# Patient Record
Sex: MALE | Race: WHITE | NOT HISPANIC OR LATINO | Employment: OTHER | URBAN - NONMETROPOLITAN AREA
[De-identification: names, ages, dates, MRNs, and addresses within clinical notes are randomized per-mention and may not be internally consistent; named-entity substitution may affect disease eponyms.]

---

## 2017-01-03 ENCOUNTER — HOSPITAL ENCOUNTER (OUTPATIENT)
Dept: CARDIAC REHAB | Facility: HOSPITAL | Age: 77
Discharge: HOME | End: 2017-01-20
Attending: INTERNAL MEDICINE

## 2017-01-23 ENCOUNTER — OFFICE VISIT (OUTPATIENT)
Dept: CARDIAC REHAB | Facility: HOSPITAL | Age: 77
End: 2017-01-23

## 2017-01-23 ENCOUNTER — TRANSCRIBE ORDERS (OUTPATIENT)
Dept: CARDIAC REHAB | Facility: HOSPITAL | Age: 77
End: 2017-01-23

## 2017-01-23 DIAGNOSIS — Z95.1 S/P CABG (CORONARY ARTERY BYPASS GRAFT): Primary | ICD-10-CM

## 2017-01-23 DIAGNOSIS — I25.10 CAD IN NATIVE ARTERY: ICD-10-CM

## 2017-01-25 ENCOUNTER — OFFICE VISIT (OUTPATIENT)
Dept: CARDIAC REHAB | Facility: HOSPITAL | Age: 77
End: 2017-01-25

## 2017-01-25 DIAGNOSIS — I25.10 CORONARY ARTERY DISEASE INVOLVING NATIVE CORONARY ARTERY OF NATIVE HEART WITHOUT ANGINA PECTORIS: ICD-10-CM

## 2017-01-26 ENCOUNTER — OFFICE VISIT (OUTPATIENT)
Dept: CARDIAC REHAB | Facility: HOSPITAL | Age: 77
End: 2017-01-26

## 2017-01-26 DIAGNOSIS — I25.10 CORONARY ARTERY DISEASE INVOLVING NATIVE CORONARY ARTERY OF NATIVE HEART WITHOUT ANGINA PECTORIS: ICD-10-CM

## 2017-01-27 ENCOUNTER — OFFICE VISIT (OUTPATIENT)
Dept: CARDIAC REHAB | Facility: HOSPITAL | Age: 77
End: 2017-01-27

## 2017-01-27 DIAGNOSIS — I25.119 CORONARY ARTERY DISEASE WITH ANGINA PECTORIS, UNSPECIFIED VESSEL OR LESION TYPE, UNSPECIFIED WHETHER NATIVE OR TRANSPLANTED HEART (HCC): ICD-10-CM

## 2017-01-30 ENCOUNTER — OFFICE VISIT (OUTPATIENT)
Dept: CARDIAC REHAB | Facility: HOSPITAL | Age: 77
End: 2017-01-30

## 2017-01-30 DIAGNOSIS — I25.119 CORONARY ARTERY DISEASE WITH ANGINA PECTORIS, UNSPECIFIED VESSEL OR LESION TYPE, UNSPECIFIED WHETHER NATIVE OR TRANSPLANTED HEART (HCC): ICD-10-CM

## 2017-02-01 ENCOUNTER — OFFICE VISIT (OUTPATIENT)
Dept: CARDIAC REHAB | Facility: HOSPITAL | Age: 77
End: 2017-02-01

## 2017-02-01 DIAGNOSIS — I25.119 CORONARY ARTERY DISEASE WITH ANGINA PECTORIS, UNSPECIFIED VESSEL OR LESION TYPE, UNSPECIFIED WHETHER NATIVE OR TRANSPLANTED HEART (HCC): ICD-10-CM

## 2017-02-03 ENCOUNTER — OFFICE VISIT (OUTPATIENT)
Dept: CARDIAC REHAB | Facility: HOSPITAL | Age: 77
End: 2017-02-03

## 2017-02-03 DIAGNOSIS — I25.119 CORONARY ARTERY DISEASE WITH ANGINA PECTORIS, UNSPECIFIED VESSEL OR LESION TYPE, UNSPECIFIED WHETHER NATIVE OR TRANSPLANTED HEART (HCC): Primary | ICD-10-CM

## 2017-02-06 ENCOUNTER — OFFICE VISIT (OUTPATIENT)
Dept: CARDIAC REHAB | Facility: HOSPITAL | Age: 77
End: 2017-02-06

## 2017-02-06 DIAGNOSIS — I25.119 CORONARY ARTERY DISEASE WITH ANGINA PECTORIS, UNSPECIFIED VESSEL OR LESION TYPE, UNSPECIFIED WHETHER NATIVE OR TRANSPLANTED HEART (HCC): Primary | ICD-10-CM

## 2017-02-08 ENCOUNTER — OFFICE VISIT (OUTPATIENT)
Dept: CARDIAC REHAB | Facility: HOSPITAL | Age: 77
End: 2017-02-08

## 2017-02-08 DIAGNOSIS — I25.119 CORONARY ARTERY DISEASE WITH ANGINA PECTORIS, UNSPECIFIED VESSEL OR LESION TYPE, UNSPECIFIED WHETHER NATIVE OR TRANSPLANTED HEART (HCC): Primary | ICD-10-CM

## 2017-02-09 ENCOUNTER — OFFICE VISIT (OUTPATIENT)
Dept: CARDIAC REHAB | Facility: HOSPITAL | Age: 77
End: 2017-02-09

## 2017-02-09 ENCOUNTER — APPOINTMENT (OUTPATIENT)
Dept: GENERAL RADIOLOGY | Facility: HOSPITAL | Age: 77
End: 2017-02-09

## 2017-02-09 ENCOUNTER — HOSPITAL ENCOUNTER (EMERGENCY)
Facility: HOSPITAL | Age: 77
Discharge: HOME | End: 2017-02-09
Attending: EMERGENCY MEDICINE | Admitting: FAMILY MEDICINE

## 2017-02-09 ENCOUNTER — APPOINTMENT (OUTPATIENT)
Dept: CT IMAGING | Facility: HOSPITAL | Age: 77
End: 2017-02-09

## 2017-02-09 VITALS
TEMPERATURE: 99.4 F | OXYGEN SATURATION: 93 % | HEIGHT: 75 IN | RESPIRATION RATE: 18 BRPM | BODY MASS INDEX: 23.62 KG/M2 | DIASTOLIC BLOOD PRESSURE: 90 MMHG | WEIGHT: 190 LBS | SYSTOLIC BLOOD PRESSURE: 147 MMHG

## 2017-02-09 DIAGNOSIS — I25.119 CORONARY ARTERY DISEASE WITH ANGINA PECTORIS, UNSPECIFIED VESSEL OR LESION TYPE, UNSPECIFIED WHETHER NATIVE OR TRANSPLANTED HEART (HCC): Primary | ICD-10-CM

## 2017-02-09 DIAGNOSIS — J06.9 VIRAL UPPER RESPIRATORY TRACT INFECTION: Primary | ICD-10-CM

## 2017-02-09 DIAGNOSIS — I50.9 ACUTE ON CHRONIC CONGESTIVE HEART FAILURE, UNSPECIFIED CONGESTIVE HEART FAILURE TYPE: ICD-10-CM

## 2017-02-09 LAB
ALBUMIN SERPL-MCNC: 4.4 G/DL (ref 3.5–5)
ALBUMIN/GLOB SERPL: 1.4 G/DL (ref 1–2)
ALP SERPL-CCNC: 78 U/L (ref 38–126)
ALT SERPL W P-5'-P-CCNC: 30 U/L (ref 13–69)
ANION GAP SERPL CALCULATED.3IONS-SCNC: 12.1 MMOL/L
AST SERPL-CCNC: 24 U/L (ref 15–46)
BACTERIA UR QL AUTO: ABNORMAL /HPF
BASOPHILS # BLD AUTO: 0.02 10*3/MM3 (ref 0–0.2)
BASOPHILS NFR BLD AUTO: 0.3 % (ref 0–2.5)
BILIRUB SERPL-MCNC: 0.9 MG/DL (ref 0.2–1.3)
BILIRUB UR QL STRIP: NEGATIVE
BUN BLD-MCNC: 17 MG/DL (ref 7–20)
BUN/CREAT SERPL: 14.2 (ref 6.3–21.9)
CALCIUM SPEC-SCNC: 8.9 MG/DL (ref 8.4–10.2)
CHLORIDE SERPL-SCNC: 106 MMOL/L (ref 98–107)
CLARITY UR: CLEAR
CO2 SERPL-SCNC: 27 MMOL/L (ref 26–30)
COLOR UR: YELLOW
CREAT BLD-MCNC: 1.2 MG/DL (ref 0.6–1.3)
DEPRECATED RDW RBC AUTO: 42.5 FL (ref 37–54)
EOSINOPHIL # BLD AUTO: 0.05 10*3/MM3 (ref 0–0.7)
EOSINOPHIL NFR BLD AUTO: 0.7 % (ref 0–7)
ERYTHROCYTE [DISTWIDTH] IN BLOOD BY AUTOMATED COUNT: 14.7 % (ref 11.5–14.5)
FLUAV AG NPH QL: NEGATIVE
FLUBV AG NPH QL IA: NEGATIVE
GFR SERPL CREATININE-BSD FRML MDRD: 59 ML/MIN/1.73
GLOBULIN UR ELPH-MCNC: 3.1 GM/DL
GLUCOSE BLD-MCNC: 105 MG/DL (ref 74–98)
GLUCOSE UR STRIP-MCNC: NEGATIVE MG/DL
HCT VFR BLD AUTO: 39.2 % (ref 42–52)
HGB BLD-MCNC: 12.2 G/DL (ref 14–18)
HGB UR QL STRIP.AUTO: ABNORMAL
HOLD SPECIMEN: NORMAL
HOLD SPECIMEN: NORMAL
HYALINE CASTS UR QL AUTO: ABNORMAL /LPF
IMM GRANULOCYTES # BLD: 0.03 10*3/MM3 (ref 0–0.06)
IMM GRANULOCYTES NFR BLD: 0.4 % (ref 0–0.6)
INR PPP: 2.2 (ref 0.9–1.1)
KETONES UR QL STRIP: ABNORMAL
LEUKOCYTE ESTERASE UR QL STRIP.AUTO: NEGATIVE
LYMPHOCYTES # BLD AUTO: 0.62 10*3/MM3 (ref 0.6–3.4)
LYMPHOCYTES NFR BLD AUTO: 9 % (ref 10–50)
MCH RBC QN AUTO: 24.7 PG (ref 27–31)
MCHC RBC AUTO-ENTMCNC: 31.1 G/DL (ref 30–37)
MCV RBC AUTO: 79.5 FL (ref 80–94)
MONOCYTES # BLD AUTO: 0.95 10*3/MM3 (ref 0–0.9)
MONOCYTES NFR BLD AUTO: 13.8 % (ref 0–12)
NEUTROPHILS # BLD AUTO: 5.2 10*3/MM3 (ref 2–6.9)
NEUTROPHILS NFR BLD AUTO: 75.8 % (ref 37–80)
NITRITE UR QL STRIP: NEGATIVE
NRBC BLD MANUAL-RTO: 0 /100 WBC (ref 0–0)
NT-PROBNP SERPL-MCNC: 2230 PG/ML (ref 0–450)
PH UR STRIP.AUTO: 5.5 [PH] (ref 5–8)
PLATELET # BLD AUTO: 202 10*3/MM3 (ref 130–400)
PMV BLD AUTO: 11.6 FL (ref 6–12)
POTASSIUM BLD-SCNC: 4.1 MMOL/L (ref 3.5–5.1)
PROT SERPL-MCNC: 7.5 G/DL (ref 6.3–8.2)
PROT UR QL STRIP: ABNORMAL
PROTHROMBIN TIME: 24.1 SECONDS (ref 9.3–12.1)
RBC # BLD AUTO: 4.93 10*6/MM3 (ref 4.7–6.1)
RBC # UR: ABNORMAL /HPF
REF LAB TEST METHOD: ABNORMAL
SODIUM BLD-SCNC: 141 MMOL/L (ref 137–145)
SP GR UR STRIP: 1.02 (ref 1–1.03)
SQUAMOUS #/AREA URNS HPF: ABNORMAL /HPF
TROPONIN I SERPL-MCNC: 0.02 NG/ML (ref 0–0.05)
UROBILINOGEN UR QL STRIP: ABNORMAL
WBC NRBC COR # BLD: 6.87 10*3/MM3 (ref 4.8–10.8)
WBC UR QL AUTO: ABNORMAL /HPF
WHOLE BLOOD HOLD SPECIMEN: NORMAL
WHOLE BLOOD HOLD SPECIMEN: NORMAL

## 2017-02-09 PROCEDURE — 81001 URINALYSIS AUTO W/SCOPE: CPT | Performed by: EMERGENCY MEDICINE

## 2017-02-09 PROCEDURE — 84484 ASSAY OF TROPONIN QUANT: CPT | Performed by: EMERGENCY MEDICINE

## 2017-02-09 PROCEDURE — 87804 INFLUENZA ASSAY W/OPTIC: CPT | Performed by: EMERGENCY MEDICINE

## 2017-02-09 PROCEDURE — 83880 ASSAY OF NATRIURETIC PEPTIDE: CPT | Performed by: EMERGENCY MEDICINE

## 2017-02-09 PROCEDURE — 87040 BLOOD CULTURE FOR BACTERIA: CPT | Performed by: PHYSICIAN ASSISTANT

## 2017-02-09 PROCEDURE — 93005 ELECTROCARDIOGRAM TRACING: CPT | Performed by: EMERGENCY MEDICINE

## 2017-02-09 PROCEDURE — 96361 HYDRATE IV INFUSION ADD-ON: CPT

## 2017-02-09 PROCEDURE — 85610 PROTHROMBIN TIME: CPT | Performed by: EMERGENCY MEDICINE

## 2017-02-09 PROCEDURE — 71250 CT THORAX DX C-: CPT

## 2017-02-09 PROCEDURE — 85025 COMPLETE CBC W/AUTO DIFF WBC: CPT | Performed by: PHYSICIAN ASSISTANT

## 2017-02-09 PROCEDURE — 71020 HC CHEST PA AND LATERAL: CPT

## 2017-02-09 PROCEDURE — 96374 THER/PROPH/DIAG INJ IV PUSH: CPT

## 2017-02-09 PROCEDURE — 99284 EMERGENCY DEPT VISIT MOD MDM: CPT

## 2017-02-09 PROCEDURE — 25010000002 FUROSEMIDE PER 20 MG: Performed by: PHYSICIAN ASSISTANT

## 2017-02-09 PROCEDURE — 80053 COMPREHEN METABOLIC PANEL: CPT | Performed by: EMERGENCY MEDICINE

## 2017-02-09 RX ORDER — FUROSEMIDE 10 MG/ML
40 INJECTION INTRAMUSCULAR; INTRAVENOUS ONCE
Status: COMPLETED | OUTPATIENT
Start: 2017-02-09 | End: 2017-02-09

## 2017-02-09 RX ORDER — AMOXICILLIN AND CLAVULANATE POTASSIUM 875; 125 MG/1; MG/1
1 TABLET, FILM COATED ORAL 2 TIMES DAILY
Qty: 14 TABLET | Refills: 0 | Status: ON HOLD | OUTPATIENT
Start: 2017-02-09 | End: 2017-06-01

## 2017-02-09 RX ORDER — SODIUM CHLORIDE 0.9 % (FLUSH) 0.9 %
10 SYRINGE (ML) INJECTION AS NEEDED
Status: DISCONTINUED | OUTPATIENT
Start: 2017-02-09 | End: 2017-02-09 | Stop reason: HOSPADM

## 2017-02-09 RX ADMIN — FUROSEMIDE 40 MG: 10 INJECTION, SOLUTION INTRAMUSCULAR; INTRAVENOUS at 21:00

## 2017-02-09 RX ADMIN — SODIUM CHLORIDE 500 ML: 9 INJECTION, SOLUTION INTRAVENOUS at 18:39

## 2017-02-10 NOTE — ED PROVIDER NOTES
Subjective   HPI Comments: Patient is 76-year-old male who is here today complaining of smothering.  Patient states it is been happening for 3 days.  Patient also complains of some dizziness for the past 3-4 days.  Patient has had a bad head cold.  Per wife's report he has been coughing all night.  Patient denies any chest pain.  Patient states the smothering is worse when he lays down.  She also has complained of some intermittent vomiting, patient does have fever 99.5.  Patient does state that he feels bad.  She does have extensive cardiac history and history of CHF.      History provided by:  Patient and spouse      Review of Systems   Constitutional: Positive for fever.   HENT: Positive for congestion.    Respiratory: Positive for choking and shortness of breath.    Cardiovascular: Negative for chest pain.   All other systems reviewed and are negative.      Past Medical History   Diagnosis Date   • BPH (benign prostatic hyperplasia)    • Diabetes mellitus    • ED (erectile dysfunction)    • GERD (gastroesophageal reflux disease)    • Heart attack    • Hyperlipidemia    • Hypertension    • S/P CABG x 1      4 stents       Allergies   Allergen Reactions   • Latex Rash       Past Surgical History   Procedure Laterality Date   • Coronary artery bypass graft         History reviewed. No pertinent family history.    Social History     Social History   • Marital status:      Spouse name: N/A   • Number of children: N/A   • Years of education: N/A     Occupational History   •  Retired     Social History Main Topics   • Smoking status: Never Smoker   • Smokeless tobacco: Never Used   • Alcohol use No   • Drug use: No   • Sexual activity: Defer      Comment:      Other Topics Concern   • None     Social History Narrative   • None           Objective   Physical Exam   Constitutional: He is oriented to person, place, and time. He appears well-developed and well-nourished.   HENT:   Head: Normocephalic.   Eyes:  EOM are normal. Pupils are equal, round, and reactive to light.   Cardiovascular: Normal rate, regular rhythm and normal heart sounds.    Pulmonary/Chest:   Decreased breath sounds bilaterally   Abdominal: Soft.   Musculoskeletal: Normal range of motion.   Neurological: He is alert and oriented to person, place, and time.   Skin: Skin is warm and dry.   Psychiatric: He has a normal mood and affect. His behavior is normal. Judgment and thought content normal.   Nursing note and vitals reviewed.      Procedures         ED Course  ED Course      Patient was stable throughout the emergency room course.  Patient was found to have some evidence of congestive heart failure.  he describes symptoms of an upper respiratory infection and this is probably exacerbated his congestive heart failure.  X-ray did not show excessive fluid overload or consolidation.  Obtain CT chest which is not show fluid overload or large effusion.  he saturated 94-95% on room air without any respiratory distress during the ER visit.  We gave IV Lasix.  Patient will take his at home dose Lasix 40 mg twice a day over the next several days if he is symptomatic for shortness of breath.  Patient will follow with primary care closely I have instructed them to call him tomorrow.  Patient will return to the emergency room for any worsening symptoms.             MDM  Number of Diagnoses or Management Options      Final diagnoses:   Viral upper respiratory tract infection   Acute on chronic congestive heart failure, unspecified congestive heart failure type            Denise Steel PA-C  02/09/17 4821

## 2017-02-10 NOTE — DISCHARGE INSTRUCTIONS
Pt will take lasix BID as needed for the next few days for symptoms of SOB. Call primary care tomorrow.  Drink plenty of fluids.  Take augmentin.

## 2017-02-14 ENCOUNTER — OFFICE VISIT (OUTPATIENT)
Dept: CARDIAC REHAB | Facility: HOSPITAL | Age: 77
End: 2017-02-14

## 2017-02-14 DIAGNOSIS — I25.119 CORONARY ARTERY DISEASE WITH ANGINA PECTORIS, UNSPECIFIED VESSEL OR LESION TYPE, UNSPECIFIED WHETHER NATIVE OR TRANSPLANTED HEART (HCC): Primary | ICD-10-CM

## 2017-02-14 LAB
BACTERIA SPEC AEROBE CULT: NORMAL
BACTERIA SPEC AEROBE CULT: NORMAL

## 2017-02-27 ENCOUNTER — APPOINTMENT (OUTPATIENT)
Dept: CARDIAC REHAB | Facility: HOSPITAL | Age: 77
End: 2017-02-27

## 2017-02-28 ENCOUNTER — APPOINTMENT (OUTPATIENT)
Dept: CARDIAC REHAB | Facility: HOSPITAL | Age: 77
End: 2017-02-28

## 2017-02-28 ENCOUNTER — TRANSCRIBE ORDERS (OUTPATIENT)
Dept: CARDIAC REHAB | Facility: HOSPITAL | Age: 77
End: 2017-02-28

## 2017-02-28 DIAGNOSIS — I21.01: Primary | ICD-10-CM

## 2017-03-01 ENCOUNTER — APPOINTMENT (OUTPATIENT)
Dept: CARDIAC REHAB | Facility: HOSPITAL | Age: 77
End: 2017-03-01

## 2017-03-02 ENCOUNTER — APPOINTMENT (OUTPATIENT)
Dept: CARDIAC REHAB | Facility: HOSPITAL | Age: 77
End: 2017-03-02

## 2017-03-03 ENCOUNTER — APPOINTMENT (OUTPATIENT)
Dept: CARDIAC REHAB | Facility: HOSPITAL | Age: 77
End: 2017-03-03

## 2017-03-06 ENCOUNTER — APPOINTMENT (OUTPATIENT)
Dept: CARDIAC REHAB | Facility: HOSPITAL | Age: 77
End: 2017-03-06

## 2017-03-07 ENCOUNTER — APPOINTMENT (OUTPATIENT)
Dept: CARDIAC REHAB | Facility: HOSPITAL | Age: 77
End: 2017-03-07

## 2017-03-08 ENCOUNTER — APPOINTMENT (OUTPATIENT)
Dept: CARDIAC REHAB | Facility: HOSPITAL | Age: 77
End: 2017-03-08

## 2017-03-09 ENCOUNTER — APPOINTMENT (OUTPATIENT)
Dept: CARDIAC REHAB | Facility: HOSPITAL | Age: 77
End: 2017-03-09

## 2017-03-10 ENCOUNTER — APPOINTMENT (OUTPATIENT)
Dept: CARDIAC REHAB | Facility: HOSPITAL | Age: 77
End: 2017-03-10

## 2017-03-13 ENCOUNTER — APPOINTMENT (OUTPATIENT)
Dept: CARDIAC REHAB | Facility: HOSPITAL | Age: 77
End: 2017-03-13

## 2017-03-14 ENCOUNTER — APPOINTMENT (OUTPATIENT)
Dept: CARDIAC REHAB | Facility: HOSPITAL | Age: 77
End: 2017-03-14

## 2017-03-15 ENCOUNTER — APPOINTMENT (OUTPATIENT)
Dept: CARDIAC REHAB | Facility: HOSPITAL | Age: 77
End: 2017-03-15

## 2017-03-16 ENCOUNTER — APPOINTMENT (OUTPATIENT)
Dept: CARDIAC REHAB | Facility: HOSPITAL | Age: 77
End: 2017-03-16

## 2017-03-17 ENCOUNTER — APPOINTMENT (OUTPATIENT)
Dept: CARDIAC REHAB | Facility: HOSPITAL | Age: 77
End: 2017-03-17

## 2017-03-20 ENCOUNTER — APPOINTMENT (OUTPATIENT)
Dept: CARDIAC REHAB | Facility: HOSPITAL | Age: 77
End: 2017-03-20

## 2017-03-21 ENCOUNTER — APPOINTMENT (OUTPATIENT)
Dept: CARDIAC REHAB | Facility: HOSPITAL | Age: 77
End: 2017-03-21

## 2017-03-22 ENCOUNTER — APPOINTMENT (OUTPATIENT)
Dept: CARDIAC REHAB | Facility: HOSPITAL | Age: 77
End: 2017-03-22

## 2017-03-23 ENCOUNTER — APPOINTMENT (OUTPATIENT)
Dept: CARDIAC REHAB | Facility: HOSPITAL | Age: 77
End: 2017-03-23

## 2017-03-24 ENCOUNTER — APPOINTMENT (OUTPATIENT)
Dept: CARDIAC REHAB | Facility: HOSPITAL | Age: 77
End: 2017-03-24

## 2017-03-27 ENCOUNTER — APPOINTMENT (OUTPATIENT)
Dept: CARDIAC REHAB | Facility: HOSPITAL | Age: 77
End: 2017-03-27

## 2017-03-28 ENCOUNTER — APPOINTMENT (OUTPATIENT)
Dept: CARDIAC REHAB | Facility: HOSPITAL | Age: 77
End: 2017-03-28

## 2017-03-29 ENCOUNTER — APPOINTMENT (OUTPATIENT)
Dept: CARDIAC REHAB | Facility: HOSPITAL | Age: 77
End: 2017-03-29

## 2017-03-30 ENCOUNTER — APPOINTMENT (OUTPATIENT)
Dept: CARDIAC REHAB | Facility: HOSPITAL | Age: 77
End: 2017-03-30

## 2017-03-31 ENCOUNTER — APPOINTMENT (OUTPATIENT)
Dept: CARDIAC REHAB | Facility: HOSPITAL | Age: 77
End: 2017-03-31

## 2017-04-02 ENCOUNTER — HOSPITAL ENCOUNTER (EMERGENCY)
Facility: HOSPITAL | Age: 77
Discharge: HOME | End: 2017-04-02
Attending: EMERGENCY MEDICINE | Admitting: EMERGENCY MEDICINE

## 2017-04-02 VITALS
RESPIRATION RATE: 18 BRPM | SYSTOLIC BLOOD PRESSURE: 153 MMHG | OXYGEN SATURATION: 96 % | HEART RATE: 61 BPM | WEIGHT: 183 LBS | DIASTOLIC BLOOD PRESSURE: 73 MMHG | TEMPERATURE: 98.1 F | HEIGHT: 75 IN | BODY MASS INDEX: 22.75 KG/M2

## 2017-04-02 DIAGNOSIS — Z79.01 WARFARIN ANTICOAGULATION: Primary | ICD-10-CM

## 2017-04-02 DIAGNOSIS — S61.411A SKIN TEAR OF HAND WITHOUT COMPLICATION, RIGHT, INITIAL ENCOUNTER: ICD-10-CM

## 2017-04-02 LAB
INR PPP: 5.66 (ref 0.9–1.1)
PROTHROMBIN TIME: 62 SECONDS (ref 9.3–12.1)

## 2017-04-02 PROCEDURE — 99282 EMERGENCY DEPT VISIT SF MDM: CPT

## 2017-04-02 PROCEDURE — 85610 PROTHROMBIN TIME: CPT | Performed by: NURSE PRACTITIONER

## 2017-04-02 NOTE — DISCHARGE INSTRUCTIONS
Do not take tomorrow morning's Coumadin dose.  Do not take Tuesday mornings Coumadin dose.  See your primary care provider on Tuesday without exception.  Keep the pressure dressing in place for 24 hours and you may remove.  Thank you

## 2017-04-02 NOTE — ED NOTES
Pressure dressing applied over wound to left middle finger. No active bleeding noted.      Sandra Yoder RN  04/02/17 1955

## 2017-04-02 NOTE — ED PROVIDER NOTES
Subjective   HPI Comments: Patient presents to the emergency department with a small less than 1 cm abrasion over the right dorsal hand which is superficial however, the cut has continued to bleed and spite of conservative measures and skin care and first aid at home.  /INR was 3.2.  Patient has followed his primary care provider's instructions regarding reducing his warfarin temporarily.  A new INR has not yet been collected.  Patient denies any other additional injury.  This presenting injury was sustained on the sharp edge of her carotids were.      History provided by:  Patient and relative   used: No        Review of Systems   Skin:        Superficial skin tear see history of present illness   Hematological: Bruises/bleeds easily (on Coumadin).       Past Medical History:   Diagnosis Date   • BPH (benign prostatic hyperplasia)    • Diabetes mellitus    • ED (erectile dysfunction)    • GERD (gastroesophageal reflux disease)    • Heart attack    • Hyperlipidemia    • Hypertension    • S/P CABG x 1     4 stents       Allergies   Allergen Reactions   • Latex Rash       Past Surgical History:   Procedure Laterality Date   • CORONARY ARTERY BYPASS GRAFT         History reviewed. No pertinent family history.    Social History     Social History   • Marital status:      Spouse name: N/A   • Number of children: N/A   • Years of education: N/A     Occupational History   •  Retired     Social History Main Topics   • Smoking status: Never Smoker   • Smokeless tobacco: Never Used   • Alcohol use No   • Drug use: No   • Sexual activity: Defer      Comment:      Other Topics Concern   • None     Social History Narrative           Objective   Physical Exam   Constitutional: He is oriented to person, place, and time. He appears well-developed.   HENT:   Head: Normocephalic.   Eyes: EOM are normal. Pupils are equal, round, and reactive to light.   Neck: Normal range of motion. Neck supple.    Cardiovascular: Normal rate and regular rhythm.    Pulmonary/Chest: Effort normal. He has no wheezes. He has no rales.   Abdominal: Soft. Bowel sounds are normal. He exhibits no distension. There is no rebound and no guarding.   Musculoskeletal: Normal range of motion. He exhibits no edema.   Neurological: He is alert and oriented to person, place, and time.   Skin: Skin is warm and dry.   Less than 1 cm annular shaped superficial skin tear on the right dorsal hand.  Neurovascular motor exams are negative.  There is no active bleeding at time of my exam.   Psychiatric: He has a normal mood and affect.   Nursing note and vitals reviewed.      Laceration Repair  Date/Time: 4/2/2017 7:20 PM  Performed by: JUAN C RAMOS  Authorized by: JAMES PIERCE   Consent: Verbal consent obtained.  Consent given by: patient  Body area: upper extremity  Location details: right long finger  Laceration length: 1 cm  Foreign bodies: no foreign bodies  Tendon involvement: none  Nerve involvement: none  Vascular damage: no  Sedation:  Patient sedated: no    Irrigation solution: saline  Amount of cleaning: standard  Debridement: none  Degree of undermining: none  Skin closure: glue  Approximation: close  Approximation difficulty: simple               ED Course  ED Course   Value Comment By Time   INR: (!!) 5.66 (Reviewed) MADELEINE Carvajal 04/02 1927                  MDM  Number of Diagnoses or Management Options  Skin tear of hand without complication, right, initial encounter:   Warfarin anticoagulation:      Amount and/or Complexity of Data Reviewed  Clinical lab tests: reviewed        Final diagnoses:   Warfarin anticoagulation   Skin tear of hand without complication, right, initial encounter            MADELEINE Carvajal  04/02/17 1953

## 2017-04-03 ENCOUNTER — APPOINTMENT (OUTPATIENT)
Dept: CARDIAC REHAB | Facility: HOSPITAL | Age: 77
End: 2017-04-03

## 2017-04-04 ENCOUNTER — TREATMENT (OUTPATIENT)
Dept: CARDIAC REHAB | Facility: HOSPITAL | Age: 77
End: 2017-04-04

## 2017-04-04 ENCOUNTER — APPOINTMENT (OUTPATIENT)
Dept: CARDIAC REHAB | Facility: HOSPITAL | Age: 77
End: 2017-04-04

## 2017-04-04 DIAGNOSIS — I21.9 ACUTE MYOCARDIAL INFARCTION, UNSPECIFIED SITE, INITIAL EPISODE OF CARE: Primary | ICD-10-CM

## 2017-04-04 PROCEDURE — 93797 PHYS/QHP OP CAR RHAB WO ECG: CPT

## 2017-04-04 PROCEDURE — 93798 PHYS/QHP OP CAR RHAB W/ECG: CPT

## 2017-04-04 NOTE — PROGRESS NOTES
Pt was seen today in CR for a Phase 2 visit.  Vital signs and session notes recorded in [employer] and will be scanned into Epic by HIM.

## 2017-04-05 ENCOUNTER — APPOINTMENT (OUTPATIENT)
Dept: CARDIAC REHAB | Facility: HOSPITAL | Age: 77
End: 2017-04-05

## 2017-04-05 ENCOUNTER — TREATMENT (OUTPATIENT)
Dept: CARDIAC REHAB | Facility: HOSPITAL | Age: 77
End: 2017-04-05

## 2017-04-05 DIAGNOSIS — I21.9 ACUTE MYOCARDIAL INFARCTION, UNSPECIFIED SITE, INITIAL EPISODE OF CARE: Primary | ICD-10-CM

## 2017-04-05 PROCEDURE — 93798 PHYS/QHP OP CAR RHAB W/ECG: CPT

## 2017-04-05 NOTE — PROGRESS NOTES
Pt was seen today in CR for a Phase 2 visit.  Vital signs and session notes recorded in "[employer]" and will be scanned into Epic by HIM.

## 2017-04-06 ENCOUNTER — APPOINTMENT (OUTPATIENT)
Dept: CARDIAC REHAB | Facility: HOSPITAL | Age: 77
End: 2017-04-06

## 2017-04-07 ENCOUNTER — APPOINTMENT (OUTPATIENT)
Dept: CARDIAC REHAB | Facility: HOSPITAL | Age: 77
End: 2017-04-07

## 2017-04-07 ENCOUNTER — TREATMENT (OUTPATIENT)
Dept: CARDIAC REHAB | Facility: HOSPITAL | Age: 77
End: 2017-04-07

## 2017-04-07 DIAGNOSIS — I21.9 ACUTE MYOCARDIAL INFARCTION, UNSPECIFIED SITE, INITIAL EPISODE OF CARE: Primary | ICD-10-CM

## 2017-04-07 PROCEDURE — 93798 PHYS/QHP OP CAR RHAB W/ECG: CPT

## 2017-04-07 NOTE — PROGRESS NOTES
Pt was seen today in CR for a Phase 2 visit.  Vital signs and session notes recorded in [employer] and will be scanned into Epic by HIM.

## 2017-04-10 ENCOUNTER — TREATMENT (OUTPATIENT)
Dept: CARDIAC REHAB | Facility: HOSPITAL | Age: 77
End: 2017-04-10

## 2017-04-10 ENCOUNTER — APPOINTMENT (OUTPATIENT)
Dept: CARDIAC REHAB | Facility: HOSPITAL | Age: 77
End: 2017-04-10

## 2017-04-10 DIAGNOSIS — I21.9 ACUTE MYOCARDIAL INFARCTION, UNSPECIFIED SITE, INITIAL EPISODE OF CARE: Primary | ICD-10-CM

## 2017-04-10 PROCEDURE — 93798 PHYS/QHP OP CAR RHAB W/ECG: CPT

## 2017-04-10 RX ORDER — CLOPIDOGREL BISULFATE 75 MG/1
75 TABLET ORAL DAILY
COMMUNITY
End: 2017-06-03 | Stop reason: HOSPADM

## 2017-04-10 NOTE — PROGRESS NOTES
Pt was seen today in CR for a Phase 2 visit.  Vital signs and session notes recorded in [employer] and will be scanned into Epic by HIM.

## 2017-04-11 ENCOUNTER — APPOINTMENT (OUTPATIENT)
Dept: CARDIAC REHAB | Facility: HOSPITAL | Age: 77
End: 2017-04-11

## 2017-04-12 ENCOUNTER — APPOINTMENT (OUTPATIENT)
Dept: CARDIAC REHAB | Facility: HOSPITAL | Age: 77
End: 2017-04-12

## 2017-04-12 ENCOUNTER — TREATMENT (OUTPATIENT)
Dept: CARDIAC REHAB | Facility: HOSPITAL | Age: 77
End: 2017-04-12

## 2017-04-12 DIAGNOSIS — I21.9 ACUTE MYOCARDIAL INFARCTION, UNSPECIFIED SITE, INITIAL EPISODE OF CARE: Primary | ICD-10-CM

## 2017-04-12 PROCEDURE — 93798 PHYS/QHP OP CAR RHAB W/ECG: CPT

## 2017-04-13 ENCOUNTER — APPOINTMENT (OUTPATIENT)
Dept: CARDIAC REHAB | Facility: HOSPITAL | Age: 77
End: 2017-04-13

## 2017-04-14 ENCOUNTER — APPOINTMENT (OUTPATIENT)
Dept: CARDIAC REHAB | Facility: HOSPITAL | Age: 77
End: 2017-04-14

## 2017-04-14 ENCOUNTER — TREATMENT (OUTPATIENT)
Dept: CARDIAC REHAB | Facility: HOSPITAL | Age: 77
End: 2017-04-14

## 2017-04-14 DIAGNOSIS — I21.9 ACUTE MYOCARDIAL INFARCTION, UNSPECIFIED SITE, INITIAL EPISODE OF CARE: Primary | ICD-10-CM

## 2017-04-14 PROCEDURE — 93798 PHYS/QHP OP CAR RHAB W/ECG: CPT

## 2017-04-17 ENCOUNTER — TREATMENT (OUTPATIENT)
Dept: CARDIAC REHAB | Facility: HOSPITAL | Age: 77
End: 2017-04-17

## 2017-04-17 ENCOUNTER — APPOINTMENT (OUTPATIENT)
Dept: CARDIAC REHAB | Facility: HOSPITAL | Age: 77
End: 2017-04-17

## 2017-04-17 DIAGNOSIS — I21.9 ACUTE MYOCARDIAL INFARCTION, UNSPECIFIED SITE, INITIAL EPISODE OF CARE: Primary | ICD-10-CM

## 2017-04-17 PROCEDURE — 93798 PHYS/QHP OP CAR RHAB W/ECG: CPT

## 2017-04-18 ENCOUNTER — APPOINTMENT (OUTPATIENT)
Dept: CARDIAC REHAB | Facility: HOSPITAL | Age: 77
End: 2017-04-18

## 2017-04-19 ENCOUNTER — APPOINTMENT (OUTPATIENT)
Dept: CARDIAC REHAB | Facility: HOSPITAL | Age: 77
End: 2017-04-19

## 2017-04-19 ENCOUNTER — TREATMENT (OUTPATIENT)
Dept: CARDIAC REHAB | Facility: HOSPITAL | Age: 77
End: 2017-04-19

## 2017-04-19 DIAGNOSIS — I25.119 CORONARY ARTERY DISEASE WITH ANGINA PECTORIS, UNSPECIFIED VESSEL OR LESION TYPE, UNSPECIFIED WHETHER NATIVE OR TRANSPLANTED HEART (HCC): ICD-10-CM

## 2017-04-19 DIAGNOSIS — I21.9 ACUTE MYOCARDIAL INFARCTION, UNSPECIFIED SITE, INITIAL EPISODE OF CARE: Primary | ICD-10-CM

## 2017-04-19 PROCEDURE — 93798 PHYS/QHP OP CAR RHAB W/ECG: CPT

## 2017-04-19 NOTE — PROGRESS NOTES
Pt was seen today in CR for a Phase 2 visit.  Vital signs and session notes recorded in [employer] and will be scanned into Epic by HIM.

## 2017-04-20 ENCOUNTER — APPOINTMENT (OUTPATIENT)
Dept: CARDIAC REHAB | Facility: HOSPITAL | Age: 77
End: 2017-04-20

## 2017-04-21 ENCOUNTER — TREATMENT (OUTPATIENT)
Dept: CARDIAC REHAB | Facility: HOSPITAL | Age: 77
End: 2017-04-21

## 2017-04-21 ENCOUNTER — APPOINTMENT (OUTPATIENT)
Dept: CARDIAC REHAB | Facility: HOSPITAL | Age: 77
End: 2017-04-21

## 2017-04-21 DIAGNOSIS — I21.9 ACUTE MYOCARDIAL INFARCTION, UNSPECIFIED SITE, INITIAL EPISODE OF CARE: Primary | ICD-10-CM

## 2017-04-21 PROCEDURE — 93798 PHYS/QHP OP CAR RHAB W/ECG: CPT

## 2017-04-21 NOTE — PROGRESS NOTES
Pt was seen today in CR for a Phase 2 visit.  Vital signs and session notes recorded in [employer] and will be scanned into Epic by HIM.

## 2017-04-24 ENCOUNTER — APPOINTMENT (OUTPATIENT)
Dept: CARDIAC REHAB | Facility: HOSPITAL | Age: 77
End: 2017-04-24

## 2017-04-24 ENCOUNTER — TREATMENT (OUTPATIENT)
Dept: CARDIAC REHAB | Facility: HOSPITAL | Age: 77
End: 2017-04-24

## 2017-04-24 DIAGNOSIS — I21.9 ACUTE MYOCARDIAL INFARCTION, UNSPECIFIED SITE, INITIAL EPISODE OF CARE: Primary | ICD-10-CM

## 2017-04-24 PROCEDURE — 93797 PHYS/QHP OP CAR RHAB WO ECG: CPT

## 2017-04-24 PROCEDURE — 93798 PHYS/QHP OP CAR RHAB W/ECG: CPT

## 2017-04-24 NOTE — PROGRESS NOTES
Pt was seen today in CR for a Phase 2 visit.  Vital signs and session notes recorded in [employer] and will be scanned into Epic by HIM.

## 2017-04-25 ENCOUNTER — APPOINTMENT (OUTPATIENT)
Dept: CARDIAC REHAB | Facility: HOSPITAL | Age: 77
End: 2017-04-25

## 2017-04-26 ENCOUNTER — APPOINTMENT (OUTPATIENT)
Dept: CARDIAC REHAB | Facility: HOSPITAL | Age: 77
End: 2017-04-26

## 2017-04-26 ENCOUNTER — TREATMENT (OUTPATIENT)
Dept: CARDIAC REHAB | Facility: HOSPITAL | Age: 77
End: 2017-04-26

## 2017-04-26 DIAGNOSIS — I21.9 ACUTE MYOCARDIAL INFARCTION, UNSPECIFIED SITE, INITIAL EPISODE OF CARE: Primary | ICD-10-CM

## 2017-04-26 PROCEDURE — 93798 PHYS/QHP OP CAR RHAB W/ECG: CPT

## 2017-04-26 NOTE — PROGRESS NOTES
Pt was seen today in CR for a Phase 2 visit.  Vital signs and session notes recorded in [employer] and will be scanned into Epic by HIM.

## 2017-04-27 ENCOUNTER — APPOINTMENT (OUTPATIENT)
Dept: CARDIAC REHAB | Facility: HOSPITAL | Age: 77
End: 2017-04-27

## 2017-04-28 ENCOUNTER — TREATMENT (OUTPATIENT)
Dept: CARDIAC REHAB | Facility: HOSPITAL | Age: 77
End: 2017-04-28

## 2017-04-28 ENCOUNTER — APPOINTMENT (OUTPATIENT)
Dept: CARDIAC REHAB | Facility: HOSPITAL | Age: 77
End: 2017-04-28

## 2017-04-28 DIAGNOSIS — I21.9 ACUTE MYOCARDIAL INFARCTION, UNSPECIFIED SITE, INITIAL EPISODE OF CARE: Primary | ICD-10-CM

## 2017-04-28 PROCEDURE — 93798 PHYS/QHP OP CAR RHAB W/ECG: CPT

## 2017-05-01 ENCOUNTER — APPOINTMENT (OUTPATIENT)
Dept: CARDIAC REHAB | Facility: HOSPITAL | Age: 77
End: 2017-05-01

## 2017-05-01 ENCOUNTER — TREATMENT (OUTPATIENT)
Dept: CARDIAC REHAB | Facility: HOSPITAL | Age: 77
End: 2017-05-01

## 2017-05-01 DIAGNOSIS — I21.9 ACUTE MYOCARDIAL INFARCTION, UNSPECIFIED SITE, INITIAL EPISODE OF CARE: Primary | ICD-10-CM

## 2017-05-01 PROCEDURE — 93798 PHYS/QHP OP CAR RHAB W/ECG: CPT

## 2017-05-02 ENCOUNTER — APPOINTMENT (OUTPATIENT)
Dept: CARDIAC REHAB | Facility: HOSPITAL | Age: 77
End: 2017-05-02

## 2017-05-03 ENCOUNTER — APPOINTMENT (OUTPATIENT)
Dept: CARDIAC REHAB | Facility: HOSPITAL | Age: 77
End: 2017-05-03

## 2017-05-03 ENCOUNTER — TREATMENT (OUTPATIENT)
Dept: CARDIAC REHAB | Facility: HOSPITAL | Age: 77
End: 2017-05-03

## 2017-05-03 DIAGNOSIS — I21.9 ACUTE MYOCARDIAL INFARCTION, UNSPECIFIED SITE, INITIAL EPISODE OF CARE: Primary | ICD-10-CM

## 2017-05-03 DIAGNOSIS — I25.119 CORONARY ARTERY DISEASE WITH ANGINA PECTORIS, UNSPECIFIED VESSEL OR LESION TYPE, UNSPECIFIED WHETHER NATIVE OR TRANSPLANTED HEART (HCC): ICD-10-CM

## 2017-05-03 PROCEDURE — 93798 PHYS/QHP OP CAR RHAB W/ECG: CPT

## 2017-05-04 ENCOUNTER — APPOINTMENT (OUTPATIENT)
Dept: CARDIAC REHAB | Facility: HOSPITAL | Age: 77
End: 2017-05-04

## 2017-05-05 ENCOUNTER — TREATMENT (OUTPATIENT)
Dept: CARDIAC REHAB | Facility: HOSPITAL | Age: 77
End: 2017-05-05

## 2017-05-05 ENCOUNTER — APPOINTMENT (OUTPATIENT)
Dept: CARDIAC REHAB | Facility: HOSPITAL | Age: 77
End: 2017-05-05

## 2017-05-05 DIAGNOSIS — I21.9 ACUTE MYOCARDIAL INFARCTION, UNSPECIFIED SITE, INITIAL EPISODE OF CARE: Primary | ICD-10-CM

## 2017-05-05 PROCEDURE — 93798 PHYS/QHP OP CAR RHAB W/ECG: CPT

## 2017-05-08 ENCOUNTER — TREATMENT (OUTPATIENT)
Dept: CARDIAC REHAB | Facility: HOSPITAL | Age: 77
End: 2017-05-08

## 2017-05-08 ENCOUNTER — APPOINTMENT (OUTPATIENT)
Dept: CARDIAC REHAB | Facility: HOSPITAL | Age: 77
End: 2017-05-08

## 2017-05-08 DIAGNOSIS — I21.9 ACUTE MYOCARDIAL INFARCTION, UNSPECIFIED SITE, INITIAL EPISODE OF CARE: Primary | ICD-10-CM

## 2017-05-08 PROCEDURE — 93798 PHYS/QHP OP CAR RHAB W/ECG: CPT

## 2017-05-09 ENCOUNTER — APPOINTMENT (OUTPATIENT)
Dept: CARDIAC REHAB | Facility: HOSPITAL | Age: 77
End: 2017-05-09

## 2017-05-10 ENCOUNTER — APPOINTMENT (OUTPATIENT)
Dept: CARDIAC REHAB | Facility: HOSPITAL | Age: 77
End: 2017-05-10

## 2017-05-10 ENCOUNTER — TREATMENT (OUTPATIENT)
Dept: CARDIAC REHAB | Facility: HOSPITAL | Age: 77
End: 2017-05-10

## 2017-05-10 DIAGNOSIS — I21.9 ACUTE MYOCARDIAL INFARCTION, UNSPECIFIED SITE, INITIAL EPISODE OF CARE: Primary | ICD-10-CM

## 2017-05-10 PROCEDURE — 93798 PHYS/QHP OP CAR RHAB W/ECG: CPT

## 2017-05-11 ENCOUNTER — APPOINTMENT (OUTPATIENT)
Dept: CARDIAC REHAB | Facility: HOSPITAL | Age: 77
End: 2017-05-11

## 2017-05-12 ENCOUNTER — TREATMENT (OUTPATIENT)
Dept: CARDIAC REHAB | Facility: HOSPITAL | Age: 77
End: 2017-05-12

## 2017-05-12 ENCOUNTER — APPOINTMENT (OUTPATIENT)
Dept: CARDIAC REHAB | Facility: HOSPITAL | Age: 77
End: 2017-05-12

## 2017-05-12 DIAGNOSIS — I21.9 ACUTE MYOCARDIAL INFARCTION, UNSPECIFIED SITE, INITIAL EPISODE OF CARE: Primary | ICD-10-CM

## 2017-05-12 PROCEDURE — 93798 PHYS/QHP OP CAR RHAB W/ECG: CPT

## 2017-05-15 ENCOUNTER — TREATMENT (OUTPATIENT)
Dept: CARDIAC REHAB | Facility: HOSPITAL | Age: 77
End: 2017-05-15

## 2017-05-15 ENCOUNTER — APPOINTMENT (OUTPATIENT)
Dept: CARDIAC REHAB | Facility: HOSPITAL | Age: 77
End: 2017-05-15

## 2017-05-15 DIAGNOSIS — I25.119 CORONARY ARTERY DISEASE WITH ANGINA PECTORIS, UNSPECIFIED VESSEL OR LESION TYPE, UNSPECIFIED WHETHER NATIVE OR TRANSPLANTED HEART (HCC): ICD-10-CM

## 2017-05-15 DIAGNOSIS — I21.9 ACUTE MYOCARDIAL INFARCTION, UNSPECIFIED SITE, INITIAL EPISODE OF CARE: Primary | ICD-10-CM

## 2017-05-15 PROCEDURE — 93798 PHYS/QHP OP CAR RHAB W/ECG: CPT

## 2017-05-16 ENCOUNTER — APPOINTMENT (OUTPATIENT)
Dept: CARDIAC REHAB | Facility: HOSPITAL | Age: 77
End: 2017-05-16

## 2017-05-17 ENCOUNTER — APPOINTMENT (OUTPATIENT)
Dept: CARDIAC REHAB | Facility: HOSPITAL | Age: 77
End: 2017-05-17

## 2017-05-17 ENCOUNTER — TREATMENT (OUTPATIENT)
Dept: CARDIAC REHAB | Facility: HOSPITAL | Age: 77
End: 2017-05-17

## 2017-05-17 DIAGNOSIS — I21.9 ACUTE MYOCARDIAL INFARCTION, UNSPECIFIED SITE, INITIAL EPISODE OF CARE: Primary | ICD-10-CM

## 2017-05-17 PROCEDURE — 93797 PHYS/QHP OP CAR RHAB WO ECG: CPT

## 2017-05-17 PROCEDURE — 93798 PHYS/QHP OP CAR RHAB W/ECG: CPT

## 2017-05-18 ENCOUNTER — APPOINTMENT (OUTPATIENT)
Dept: CARDIAC REHAB | Facility: HOSPITAL | Age: 77
End: 2017-05-18

## 2017-05-19 ENCOUNTER — APPOINTMENT (OUTPATIENT)
Dept: CARDIAC REHAB | Facility: HOSPITAL | Age: 77
End: 2017-05-19

## 2017-05-19 ENCOUNTER — TREATMENT (OUTPATIENT)
Dept: CARDIAC REHAB | Facility: HOSPITAL | Age: 77
End: 2017-05-19

## 2017-05-19 DIAGNOSIS — I21.9 ACUTE MYOCARDIAL INFARCTION, UNSPECIFIED SITE, INITIAL EPISODE OF CARE: Primary | ICD-10-CM

## 2017-05-19 PROCEDURE — 93798 PHYS/QHP OP CAR RHAB W/ECG: CPT

## 2017-05-22 ENCOUNTER — TREATMENT (OUTPATIENT)
Dept: CARDIAC REHAB | Facility: HOSPITAL | Age: 77
End: 2017-05-22

## 2017-05-22 ENCOUNTER — APPOINTMENT (OUTPATIENT)
Dept: CARDIAC REHAB | Facility: HOSPITAL | Age: 77
End: 2017-05-22

## 2017-05-22 DIAGNOSIS — I21.9 ACUTE MYOCARDIAL INFARCTION, UNSPECIFIED SITE, INITIAL EPISODE OF CARE: Primary | ICD-10-CM

## 2017-05-22 PROCEDURE — 93798 PHYS/QHP OP CAR RHAB W/ECG: CPT

## 2017-05-23 ENCOUNTER — APPOINTMENT (OUTPATIENT)
Dept: CARDIAC REHAB | Facility: HOSPITAL | Age: 77
End: 2017-05-23

## 2017-05-24 ENCOUNTER — APPOINTMENT (OUTPATIENT)
Dept: CARDIAC REHAB | Facility: HOSPITAL | Age: 77
End: 2017-05-24

## 2017-05-25 ENCOUNTER — APPOINTMENT (OUTPATIENT)
Dept: CARDIAC REHAB | Facility: HOSPITAL | Age: 77
End: 2017-05-25

## 2017-05-26 ENCOUNTER — TRANSCRIBE ORDERS (OUTPATIENT)
Dept: CARDIAC REHAB | Facility: HOSPITAL | Age: 77
End: 2017-05-26

## 2017-05-26 ENCOUNTER — APPOINTMENT (OUTPATIENT)
Dept: CARDIAC REHAB | Facility: HOSPITAL | Age: 77
End: 2017-05-26

## 2017-05-26 DIAGNOSIS — I21.4 NSTEMI (NON-ST ELEVATED MYOCARDIAL INFARCTION) (HCC): Primary | ICD-10-CM

## 2017-05-30 ENCOUNTER — APPOINTMENT (OUTPATIENT)
Dept: CARDIAC REHAB | Facility: HOSPITAL | Age: 77
End: 2017-05-30

## 2017-05-31 ENCOUNTER — APPOINTMENT (OUTPATIENT)
Dept: CARDIAC REHAB | Facility: HOSPITAL | Age: 77
End: 2017-05-31

## 2017-06-01 ENCOUNTER — APPOINTMENT (OUTPATIENT)
Dept: CT IMAGING | Facility: HOSPITAL | Age: 77
End: 2017-06-01

## 2017-06-01 ENCOUNTER — APPOINTMENT (OUTPATIENT)
Dept: CARDIOLOGY | Facility: HOSPITAL | Age: 77
End: 2017-06-01
Attending: INTERNAL MEDICINE

## 2017-06-01 ENCOUNTER — APPOINTMENT (OUTPATIENT)
Dept: GENERAL RADIOLOGY | Facility: HOSPITAL | Age: 77
End: 2017-06-01

## 2017-06-01 ENCOUNTER — APPOINTMENT (OUTPATIENT)
Dept: CARDIAC REHAB | Facility: HOSPITAL | Age: 77
End: 2017-06-01

## 2017-06-01 ENCOUNTER — HOSPITAL ENCOUNTER (INPATIENT)
Facility: HOSPITAL | Age: 77
LOS: 2 days | Discharge: HOME | End: 2017-06-03
Attending: INTERNAL MEDICINE | Admitting: NEUROLOGICAL SURGERY

## 2017-06-01 DIAGNOSIS — Z78.9 IMPAIRED MOBILITY AND ADLS: ICD-10-CM

## 2017-06-01 DIAGNOSIS — Z74.09 IMPAIRED FUNCTIONAL MOBILITY, BALANCE, GAIT, AND ENDURANCE: ICD-10-CM

## 2017-06-01 DIAGNOSIS — I63.9 CEREBROVASCULAR ACCIDENT (CVA), UNSPECIFIED MECHANISM (HCC): Primary | ICD-10-CM

## 2017-06-01 DIAGNOSIS — R79.89 ELEVATED SERUM CREATININE: ICD-10-CM

## 2017-06-01 DIAGNOSIS — I21.3 ST ELEVATION MYOCARDIAL INFARCTION (STEMI), UNSPECIFIED ARTERY (HCC): ICD-10-CM

## 2017-06-01 DIAGNOSIS — R77.8 ELEVATED TROPONIN: ICD-10-CM

## 2017-06-01 DIAGNOSIS — Z74.09 IMPAIRED MOBILITY AND ADLS: ICD-10-CM

## 2017-06-01 DIAGNOSIS — R47.1 DYSARTHRIA: ICD-10-CM

## 2017-06-01 PROBLEM — Z86.73 H/O: CVA (CEREBROVASCULAR ACCIDENT): Status: ACTIVE | Noted: 2017-06-01

## 2017-06-01 PROBLEM — I48.0 PAF (PAROXYSMAL ATRIAL FIBRILLATION) (HCC): Status: ACTIVE | Noted: 2017-06-01

## 2017-06-01 PROBLEM — E78.5 HYPERLIPIDEMIA: Status: ACTIVE | Noted: 2017-06-01

## 2017-06-01 PROBLEM — I10 HYPERTENSION: Status: ACTIVE | Noted: 2017-06-01

## 2017-06-01 PROBLEM — I47.29 NON-SUSTAINED VENTRICULAR TACHYCARDIA (HCC): Status: ACTIVE | Noted: 2017-06-01

## 2017-06-01 PROBLEM — I25.10 CORONARY ARTERY DISEASE: Status: ACTIVE | Noted: 2017-06-01

## 2017-06-01 PROBLEM — I25.5 ISCHEMIC CARDIOMYOPATHY: Status: ACTIVE | Noted: 2017-06-01

## 2017-06-01 PROBLEM — E11.9 DIABETES MELLITUS (HCC): Status: ACTIVE | Noted: 2017-06-01

## 2017-06-01 LAB
ALT SERPL W P-5'-P-CCNC: 13 U/L (ref 7–40)
AST SERPL-CCNC: 16 U/L (ref 0–33)
BASOPHILS # BLD AUTO: 0.02 10*3/MM3 (ref 0–0.2)
BASOPHILS NFR BLD AUTO: 0.3 % (ref 0–1)
BH CV ECHO MEAS - AO ROOT AREA (BSA CORRECTED): 2
BH CV ECHO MEAS - AO ROOT AREA: 11.9 CM^2
BH CV ECHO MEAS - AO ROOT DIAM: 3.9 CM
BH CV ECHO MEAS - BSA(HAYCOCK): 2 M^2
BH CV ECHO MEAS - BSA: 2 M^2
BH CV ECHO MEAS - BZI_BMI: 20.9 KILOGRAMS/M^2
BH CV ECHO MEAS - BZI_METRIC_HEIGHT: 188 CM
BH CV ECHO MEAS - BZI_METRIC_WEIGHT: 73.9 KG
BH CV ECHO MEAS - EDV(CUBED): 164.6 ML
BH CV ECHO MEAS - EDV(TEICH): 146.2 ML
BH CV ECHO MEAS - EF(CUBED): 64 %
BH CV ECHO MEAS - EF(TEICH): 54.9 %
BH CV ECHO MEAS - ESV(CUBED): 59.3 ML
BH CV ECHO MEAS - ESV(TEICH): 65.9 ML
BH CV ECHO MEAS - FS: 28.8 %
BH CV ECHO MEAS - IVS/LVPW: 0.95
BH CV ECHO MEAS - IVSD: 1.2 CM
BH CV ECHO MEAS - LA DIMENSION: 4.6 CM
BH CV ECHO MEAS - LA/AO: 1.2
BH CV ECHO MEAS - LV MASS(C)D: 284.9 GRAMS
BH CV ECHO MEAS - LV MASS(C)DI: 143 GRAMS/M^2
BH CV ECHO MEAS - LVIDD: 5.5 CM
BH CV ECHO MEAS - LVIDS: 3.9 CM
BH CV ECHO MEAS - LVPWD: 1.3 CM
BH CV ECHO MEAS - MV A MAX VEL: 104 CM/SEC
BH CV ECHO MEAS - MV DEC SLOPE: 274 CM/SEC^2
BH CV ECHO MEAS - MV DEC TIME: 0.3 SEC
BH CV ECHO MEAS - MV E MAX VEL: 82.9 CM/SEC
BH CV ECHO MEAS - MV E/A: 0.8
BH CV ECHO MEAS - MV P1/2T MAX VEL: 93.4 CM/SEC
BH CV ECHO MEAS - MV P1/2T: 99.8 MSEC
BH CV ECHO MEAS - MVA P1/2T LCG: 2.4 CM^2
BH CV ECHO MEAS - MVA(P1/2T): 2.2 CM^2
BH CV ECHO MEAS - PA ACC SLOPE: 958 CM/SEC^2
BH CV ECHO MEAS - PA ACC TIME: 0.13 SEC
BH CV ECHO MEAS - PA PR(ACCEL): 18.7 MMHG
BH CV ECHO MEAS - PI END-D VEL: 134 CM/SEC
BH CV ECHO MEAS - RAP SYSTOLE: 3 MMHG
BH CV ECHO MEAS - RV MAX PG: 2.8 MMHG
BH CV ECHO MEAS - RV V1 MAX: 83.6 CM/SEC
BH CV ECHO MEAS - RVSP: 17.1 MMHG
BH CV ECHO MEAS - SI(CUBED): 52.8 ML/M^2
BH CV ECHO MEAS - SI(TEICH): 40.3 ML/M^2
BH CV ECHO MEAS - SV(CUBED): 105.2 ML
BH CV ECHO MEAS - SV(TEICH): 80.3 ML
BH CV ECHO MEAS - TAPSE (>1.6): 1.6 CM2
BH CV ECHO MEAS - TR MAX VEL: 188 CM/SEC
BH CV VAS BP RIGHT ARM: NORMAL MMHG
BH CV XLRA - RV BASE: 4 CM
BH CV XLRA - RV LENGTH: 7.5 CM
BH CV XLRA - RV MID: 4 CM
BH CV XLRA - TDI S': 10.2 CM/SEC
BUN BLDA-MCNC: 16 MG/DL (ref 8–26)
CA-I BLDA-SCNC: 1.19 MMOL/L (ref 1.2–1.32)
CHLORIDE BLDA-SCNC: 98 MMOL/L (ref 98–109)
CO2 BLDA-SCNC: 27 MMOL/L (ref 24–29)
CREAT BLDA-MCNC: 1.4 MG/DL (ref 0.6–1.3)
DEPRECATED RDW RBC AUTO: 45.5 FL (ref 37–54)
EOSINOPHIL # BLD AUTO: 0.25 10*3/MM3 (ref 0.1–0.3)
EOSINOPHIL NFR BLD AUTO: 3.8 % (ref 0–3)
ERYTHROCYTE [DISTWIDTH] IN BLOOD BY AUTOMATED COUNT: 15.6 % (ref 11.3–14.5)
GLUCOSE BLDC GLUCOMTR-MCNC: 115 MG/DL (ref 70–130)
GLUCOSE BLDC GLUCOMTR-MCNC: 115 MG/DL (ref 70–130)
GLUCOSE BLDC GLUCOMTR-MCNC: 182 MG/DL (ref 70–130)
HCT VFR BLD AUTO: 37.9 % (ref 38.9–50.9)
HCT VFR BLDA CALC: 38 % (ref 38–51)
HGB BLD-MCNC: 12 G/DL (ref 13.1–17.5)
HGB BLDA-MCNC: 12.9 G/DL (ref 12–17)
HOLD SPECIMEN: NORMAL
HOLD SPECIMEN: NORMAL
IMM GRANULOCYTES # BLD: 0.02 10*3/MM3 (ref 0–0.03)
IMM GRANULOCYTES NFR BLD: 0.3 % (ref 0–0.6)
INR PPP: 1.1 (ref 0.8–1.2)
LEFT ATRIUM VOLUME INDEX: 47.7 ML/M2
LEFT ATRIUM VOLUME: 95 CM3
LV EF 2D ECHO EST: 50 %
LYMPHOCYTES # BLD AUTO: 1.37 10*3/MM3 (ref 0.6–4.8)
LYMPHOCYTES NFR BLD AUTO: 20.9 % (ref 24–44)
MCH RBC QN AUTO: 25.6 PG (ref 27–31)
MCHC RBC AUTO-ENTMCNC: 31.7 G/DL (ref 32–36)
MCV RBC AUTO: 81 FL (ref 80–99)
MONOCYTES # BLD AUTO: 0.62 10*3/MM3 (ref 0–1)
MONOCYTES NFR BLD AUTO: 9.5 % (ref 0–12)
NEUTROPHILS # BLD AUTO: 4.26 10*3/MM3 (ref 1.5–8.3)
NEUTROPHILS NFR BLD AUTO: 65.2 % (ref 41–71)
PLATELET # BLD AUTO: 235 10*3/MM3 (ref 150–450)
PMV BLD AUTO: 10.6 FL (ref 6–12)
POTASSIUM BLDA-SCNC: 3.3 MMOL/L (ref 3.5–4.9)
PROTHROMBIN TIME: 13.5 SECONDS (ref 12.8–15.2)
RBC # BLD AUTO: 4.68 10*6/MM3 (ref 4.2–5.76)
SODIUM BLDA-SCNC: 142 MMOL/L (ref 138–146)
TROPONIN I SERPL-MCNC: 1.04 NG/ML (ref 0–0.07)
WBC NRBC COR # BLD: 6.54 10*3/MM3 (ref 3.5–10.8)
WHOLE BLOOD HOLD SPECIMEN: NORMAL
WHOLE BLOOD HOLD SPECIMEN: NORMAL

## 2017-06-01 PROCEDURE — C1894 INTRO/SHEATH, NON-LASER: HCPCS | Performed by: NEUROLOGICAL SURGERY

## 2017-06-01 PROCEDURE — B3141ZZ FLUOROSCOPY OF LEFT COMMON CAROTID ARTERY USING LOW OSMOLAR CONTRAST: ICD-10-PCS | Performed by: RADIOLOGY

## 2017-06-01 PROCEDURE — C2628 CATHETER, OCCLUSION: HCPCS | Performed by: NEUROLOGICAL SURGERY

## 2017-06-01 PROCEDURE — 80047 BASIC METABLC PNL IONIZED CA: CPT

## 2017-06-01 PROCEDURE — 84450 TRANSFERASE (AST) (SGOT): CPT | Performed by: EMERGENCY MEDICINE

## 2017-06-01 PROCEDURE — 84484 ASSAY OF TROPONIN QUANT: CPT

## 2017-06-01 PROCEDURE — C1887 CATHETER, GUIDING: HCPCS | Performed by: NEUROLOGICAL SURGERY

## 2017-06-01 PROCEDURE — 70450 CT HEAD/BRAIN W/O DYE: CPT

## 2017-06-01 PROCEDURE — C1760 CLOSURE DEV, VASC: HCPCS | Performed by: NEUROLOGICAL SURGERY

## 2017-06-01 PROCEDURE — 99291 CRITICAL CARE FIRST HOUR: CPT | Performed by: INTERNAL MEDICINE

## 2017-06-01 PROCEDURE — 36224 PLACE CATH CAROTD ART: CPT | Performed by: NEUROLOGICAL SURGERY

## 2017-06-01 PROCEDURE — 25010000002 HEPARIN (PORCINE) PER 1000 UNITS: Performed by: RADIOLOGY

## 2017-06-01 PROCEDURE — 61645 PERQ ART M-THROMBECT &/NFS: CPT | Performed by: NEUROLOGICAL SURGERY

## 2017-06-01 PROCEDURE — 82962 GLUCOSE BLOOD TEST: CPT

## 2017-06-01 PROCEDURE — 0042T HC CT CEREBRAL PERFUSION W/WO CONTRAST: CPT

## 2017-06-01 PROCEDURE — 71010 HC CHEST PA OR AP: CPT

## 2017-06-01 PROCEDURE — 85025 COMPLETE CBC W/AUTO DIFF WBC: CPT | Performed by: EMERGENCY MEDICINE

## 2017-06-01 PROCEDURE — C1769 GUIDE WIRE: HCPCS | Performed by: NEUROLOGICAL SURGERY

## 2017-06-01 PROCEDURE — 0 IOPAMIDOL PER 1 ML: Performed by: EMERGENCY MEDICINE

## 2017-06-01 PROCEDURE — 93005 ELECTROCARDIOGRAM TRACING: CPT | Performed by: EMERGENCY MEDICINE

## 2017-06-01 PROCEDURE — 92610 EVALUATE SWALLOWING FUNCTION: CPT

## 2017-06-01 PROCEDURE — 99221 1ST HOSP IP/OBS SF/LOW 40: CPT | Performed by: NEUROLOGICAL SURGERY

## 2017-06-01 PROCEDURE — 85014 HEMATOCRIT: CPT

## 2017-06-01 PROCEDURE — 0 IODIXANOL PER 1 ML: Performed by: NEUROLOGICAL SURGERY

## 2017-06-01 PROCEDURE — 3E05317 INTRODUCTION OF OTHER THROMBOLYTIC INTO PERIPHERAL ARTERY, PERCUTANEOUS APPROACH: ICD-10-PCS | Performed by: RADIOLOGY

## 2017-06-01 PROCEDURE — 03CL3ZZ EXTIRPATION OF MATTER FROM LEFT INTERNAL CAROTID ARTERY, PERCUTANEOUS APPROACH: ICD-10-PCS | Performed by: RADIOLOGY

## 2017-06-01 PROCEDURE — 36415 COLL VENOUS BLD VENIPUNCTURE: CPT

## 2017-06-01 PROCEDURE — 85610 PROTHROMBIN TIME: CPT

## 2017-06-01 PROCEDURE — 99221 1ST HOSP IP/OBS SF/LOW 40: CPT | Performed by: INTERNAL MEDICINE

## 2017-06-01 PROCEDURE — 93306 TTE W/DOPPLER COMPLETE: CPT

## 2017-06-01 PROCEDURE — B3171ZZ FLUOROSCOPY OF LEFT INTERNAL CAROTID ARTERY USING LOW OSMOLAR CONTRAST: ICD-10-PCS | Performed by: RADIOLOGY

## 2017-06-01 PROCEDURE — 93306 TTE W/DOPPLER COMPLETE: CPT | Performed by: INTERNAL MEDICINE

## 2017-06-01 PROCEDURE — 99285 EMERGENCY DEPT VISIT HI MDM: CPT

## 2017-06-01 PROCEDURE — 84460 ALANINE AMINO (ALT) (SGPT): CPT | Performed by: EMERGENCY MEDICINE

## 2017-06-01 RX ORDER — ASPIRIN 300 MG/1
300 SUPPOSITORY RECTAL DAILY
Status: DISCONTINUED | OUTPATIENT
Start: 2017-06-02 | End: 2017-06-02

## 2017-06-01 RX ORDER — ATORVASTATIN CALCIUM 40 MG/1
40 TABLET, FILM COATED ORAL NIGHTLY
Status: DISCONTINUED | OUTPATIENT
Start: 2017-06-01 | End: 2017-06-03 | Stop reason: HOSPADM

## 2017-06-01 RX ORDER — SODIUM CHLORIDE 0.9 % (FLUSH) 0.9 %
10 SYRINGE (ML) INJECTION AS NEEDED
Status: DISCONTINUED | OUTPATIENT
Start: 2017-06-01 | End: 2017-06-02

## 2017-06-01 RX ORDER — AMIODARONE HYDROCHLORIDE 200 MG/1
200 TABLET ORAL
Status: DISCONTINUED | OUTPATIENT
Start: 2017-06-02 | End: 2017-06-03 | Stop reason: HOSPADM

## 2017-06-01 RX ORDER — SODIUM CHLORIDE 0.9 % (FLUSH) 0.9 %
1-10 SYRINGE (ML) INJECTION AS NEEDED
Status: DISCONTINUED | OUTPATIENT
Start: 2017-06-01 | End: 2017-06-03 | Stop reason: HOSPADM

## 2017-06-01 RX ORDER — AMIODARONE HYDROCHLORIDE 200 MG/1
200 TABLET ORAL DAILY
COMMUNITY

## 2017-06-01 RX ORDER — IODIXANOL 320 MG/ML
INJECTION, SOLUTION INTRAVASCULAR AS NEEDED
Status: DISCONTINUED | OUTPATIENT
Start: 2017-06-01 | End: 2017-06-01 | Stop reason: HOSPADM

## 2017-06-01 RX ORDER — LIDOCAINE HYDROCHLORIDE 10 MG/ML
INJECTION, SOLUTION INFILTRATION; PERINEURAL AS NEEDED
Status: DISCONTINUED | OUTPATIENT
Start: 2017-06-01 | End: 2017-06-01 | Stop reason: HOSPADM

## 2017-06-01 RX ORDER — SODIUM CHLORIDE 9 MG/ML
75 INJECTION, SOLUTION INTRAVENOUS CONTINUOUS
Status: DISCONTINUED | OUTPATIENT
Start: 2017-06-01 | End: 2017-06-02

## 2017-06-01 RX ORDER — HEPARIN SODIUM 1000 [USP'U]/ML
INJECTION, SOLUTION INTRAVENOUS; SUBCUTANEOUS AS NEEDED
Status: DISCONTINUED | OUTPATIENT
Start: 2017-06-01 | End: 2017-06-01 | Stop reason: HOSPADM

## 2017-06-01 RX ORDER — ACETAMINOPHEN 325 MG/1
650 TABLET ORAL EVERY 6 HOURS PRN
Status: DISCONTINUED | OUTPATIENT
Start: 2017-06-01 | End: 2017-06-03 | Stop reason: HOSPADM

## 2017-06-01 RX ORDER — ONDANSETRON 2 MG/ML
4 INJECTION INTRAMUSCULAR; INTRAVENOUS EVERY 6 HOURS PRN
Status: DISCONTINUED | OUTPATIENT
Start: 2017-06-01 | End: 2017-06-03 | Stop reason: HOSPADM

## 2017-06-01 RX ORDER — ASPIRIN 325 MG
325 TABLET ORAL DAILY
Status: DISCONTINUED | OUTPATIENT
Start: 2017-06-02 | End: 2017-06-03 | Stop reason: HOSPADM

## 2017-06-01 RX ADMIN — NICARDIPINE HYDROCHLORIDE 7.5 MG/HR: 25 INJECTION INTRAVENOUS at 10:00

## 2017-06-01 RX ADMIN — NICARDIPINE HYDROCHLORIDE 2.5 MG/HR: 25 INJECTION INTRAVENOUS at 21:57

## 2017-06-01 RX ADMIN — SODIUM CHLORIDE 75 ML/HR: 9 INJECTION, SOLUTION INTRAVENOUS at 10:00

## 2017-06-01 RX ADMIN — IOPAMIDOL 40 ML: 755 INJECTION, SOLUTION INTRAVENOUS at 07:45

## 2017-06-01 RX ADMIN — ACETAMINOPHEN 650 MG: 325 TABLET, FILM COATED ORAL at 11:41

## 2017-06-01 RX ADMIN — NICARDIPINE HYDROCHLORIDE 5 MG/HR: 25 INJECTION INTRAVENOUS at 14:20

## 2017-06-01 RX ADMIN — ATORVASTATIN CALCIUM 40 MG: 40 TABLET, FILM COATED ORAL at 20:31

## 2017-06-01 RX ADMIN — SODIUM CHLORIDE 75 ML/HR: 9 INJECTION, SOLUTION INTRAVENOUS at 20:31

## 2017-06-01 NOTE — H&P
Critical Care History & Physical    Patient name: Pancho Bonner  CSN: [id number]  MRN: [id number]  : 1940  Today's date: 2017    Primary Care Physician: Shar Obregon MD    Chief complaint:  Right sided weakness and expressive aphasia    HPI:   Mr. Bonner is a 78 y/o WM who was transferred to [facility] from [facility] for right sided weakness and inability to speak.    He does not recall what happened to him and there is no family at bedside.  Per the chart, he was last known well last night at bedtime.  He woke up with right sided weakness and was not able to speak.  EMS was called and he was brought to [facility].  NIH was 10. He had a room air sat 97%, temp 97.7, P 60, RR 18, 151/71.  CT Head had no acute findings. CT cerebral perfusion scan had significant size areas of abnormal perfusion in the left cerebral hemisphere with evidence of significant ischemic penumbra. He had a WBC 6.5, Hgb 12, HCT 37.9, plts 235, Troponin 1.04, glucose 115, BUN 16, Cr 1.4, K 3.3, PT 13.5 and INR 1.1.  He was taken to the cath lab emergently and had a thrombectomy and intra-arterial TPA. His speech and right-sided weakness has already improved according to the patient and family.      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 He has known coronary artery disease and has previously undergone CABG and stents. He was just admitted to [facility] a week ago for a myocardial infarction, and was  discharged 17. During that hospitalization he underwent a LHC on 17 that was unchanged since [facility] in 2016.  It showed severe three vessel disease with patent LIMA to LAD bypass graft and only medical management was recommended. I do not know what his EF was, but it is presumably low. During that hospitalization he also underwent a pacemaker generator change 17 prior to discharge. (He had  previously undergone AICD/PPM placement for ICM and NSVT in 8/2009). He was taken off his coumadin during his hospitalization last week for this procedure, and although it was apparently resumed at discharge, upon his arrival at [facility] today his INR was only 1.1.  There is no H&P or d/c summary for that hospitalization.    PMH:   Past Medical History:   Diagnosis Date   • Anxiety    • Arnold-Chiari malformation, type I     followed by Dr. Martinez, but last note available was 2014   • BPH (benign prostatic hyperplasia)    • Chronic Pleural effusion on right    • Complex partial seizure     followed by Dr. Martinez, but last note available was 2014   • Congestive heart failure     follows with Dr. Pompa at [facility], EF 30%   • Coronary artery disease     on ASA/plavix   • CVA (cerebral vascular accident)     apical thrombus on chronic coumadin   • Diabetes mellitus    • ED (erectile dysfunction)    • GERD (gastroesophageal reflux disease)    • Hyperlipidemia    • Hypertension    • Ischemic cardiomyopathy    • Myocardial infarction    • Non-sustained ventricular tachycardia        PSH:   Past Surgical History:   Procedure Laterality Date   • CARDIAC CATHETERIZATION  02/2016    [facility], 3 vessel disease, patent LIMA to LAD bypass graft    • CARDIAC CATHETERIZATION  05/25/2017    [facility], severe 3 vessel disease, patent LIMA to LAD bypass graft, recommend medical management    • CARDIAC DEFIBRILLATOR PLACEMENT  08/2009    w/ PPM Medtronic (DDD)   • COLONOSCOPY W/ BIOPSIES     • CORONARY ANGIOPLASTY WITH STENT PLACEMENT     • CORONARY ARTERY BYPASS GRAFT  2000    [facility]   • INSERT / REPLACE / REMOVE PACEMAKER  05/26/2017    Medtronic generator change, Dr. Ho, [facility]        PFH:   Family History   Problem Relation Age of Onset   • Diabetes Mother    • Hypertension Mother    • Heart disease Mother    • Heart disease Father    • Stroke Father    • Heart disease Sister        SH:   Social History     Social History   • Marital status:       Spouse name: N/A   • Number of children: N/A   • Years of education: N/A     Occupational History   •  Retired     Social History Main Topics   • Smoking status: Never Smoker   • Smokeless tobacco: Never Used   • Alcohol use No   • Drug use: No   • Sexual activity: Defer      Comment:      Other Topics Concern   • None     Social History Narrative   • None       Allergies:   Allergies   Allergen Reactions   • Latex Rash       Code Status:  Full Code    Home Medications:  Prescriptions Prior to Admission   Medication Sig Dispense Refill Last Dose   • ALPRAZolam (XANAX) 0.5 MG tablet TAKE 1/2-1 TABLET BY MOUTH DAILY AS NEEDED  0 Taking   • amLODIPine (NORVASC) 10 MG tablet Take  by mouth daily.   Taking   • amoxicillin-clavulanate (AUGMENTIN) 875-125 MG per tablet Take 1 tablet by mouth 2 (Two) Times a Day. 14 tablet 0 Not Taking   • aspirin 81 MG tablet Take  by mouth daily.   Taking   • atorvastatin (LIPITOR) 40 MG tablet    Taking   • carvedilol (COREG) 25 MG tablet Take  by mouth 2 (two) times a day.   Taking   • carvedilol (COREG) 6.25 MG tablet    Not Taking   • clopidogrel (PLAVIX) 75 MG tablet Take 75 mg by mouth Daily.   Taking   • clotrimazole-betamethasone (LOTRISONE) 1-0.05 % cream APPLY TWICE A DAY TO RASH ON LEFT ANKLE  0 Taking   • ferrous gluconate (FERGON) 324 MG tablet TAKE 1 TABLET EVERY DAY  0 Not Taking   • Ferrous Gluconate 325 (36 FE) MG tablet TAKE 1 TABLET EVERY DAY  1 Taking   • furosemide (LASIX) 40 MG tablet Take  by mouth daily.   Taking   • lamoTRIgine (LaMICtal) 150 MG tablet Take  by mouth 2 (two) times a day.   Taking   • lisinopril (PRINIVIL,ZESTRIL) 30 MG tablet Take  by mouth daily.   Taking   • metFORMIN (GLUCOPHAGE) 500 MG tablet Take  by mouth 2 (two) times a day.   Taking   • mupirocin (BACTROBAN) 2 % ointment APPLY SPARINGLY TWICE A DAY TO LESIONS ON HAND FOR 10 DAYS  0 Not Taking   • pantoprazole (PROTONIX) 40 MG EC tablet    Taking   • PARoxetine (PAXIL) 10  "MG tablet TAKE 1 TABLET EVERY DAY  1 Taking   • potassium chloride (KLOR-CON) 20 MEQ CR tablet Take  by mouth daily.   Taking   • pravastatin (PRAVACHOL) 40 MG tablet Take  by mouth daily.   Taking   • warfarin (COUMADIN) 5 MG tablet Take  by mouth daily.   Taking       Review of Systems  Negative systems except for what is noted in HPI     Vital Signs:  Blood pressure 128/61, pulse 60, temperature 97.8 °F (36.6 °C), temperature source Oral, resp. rate 18, height 74\" (188 cm), weight 163 lb (73.9 kg), SpO2 97 %.    Physical Exam:  Constitutional:  Appears well-developed and well-nourished. No distress.   HEENT:  Normocephalic and atraumatic. PERRL  Neck:  Neck supple. No JVD present.   CV: Normal rate, regular rhythm, intact distal pulses.  No gallop, murmur or rub  Pulmonary/Chest: Effort normal and breath sounds normal. No respiratory distress. No wheezes, rhonchi or rales.   Abdominal: Soft. +BS.  No distension and no mass. There is no tenderness.   Musculoskeletal: slight right sided weakness  Neurological: Alert and slightly confused to place and time.  Very slight right facial droop and right sided weakness with mild expressive aphasia  Skin: Skin is warm and dry. No rash noted.   Extremities:  No clubbing, edema or cyanosis  Psychiatric: Normal mood and affect. Behavior is normal.     Labs:    Results from last 7 days  Lab Units 06/01/17  0749   WBC 10*3/mm3 6.54   HEMOGLOBIN g/dL 12.0*   HEMATOCRIT % 37.9*   PLATELETS 10*3/mm3 235       Results from last 7 days  Lab Units 06/01/17  0818 06/01/17  0735   CREATININE mg/dL  --  1.40*   ALT (SGPT) U/L 13  --    AST (SGOT) U/L 16  --      No results found for: MG, PHOS     Interval:  (post thrombectomy)  1a. Level Of Consciousness: 0-->Alert: keenly responsive  1b. LOC Questions: 0-->Answers both questions correctly  1c. LOC Commands: 0-->Performs both tasks correctly  2. Best Gaze: 0-->Normal  3. Visual: 0-->No visual loss  4. Facial Palsy: 1-->Minor paralysis " (flattened nasolabial fold, asymmetry on smiling)  5a. Motor Arm, Left: 0-->No drift: limb holds 90 (or 45) degrees for full 10 secs  5b. Motor Arm, Right: 0-->No drift: limb holds 90 (or 45) degrees for full 10 secs  6a. Motor Leg, Left: 0-->No drift: leg holds 30 degree position for full 5 secs  6b. Motor Leg, Right: 0-->No drift: leg holds 30 degree position for full 5 secs  7. Limb Ataxia: 1-->Present in one limb  8. Sensory: 1-->Mild-to-moderate sensory loss: patient feels pinprick is less sharp or is dull on the affected side: or there is a loss of superficial pain with pinprick, but patient is aware of being touched  9. Best Language: 1-->Mild-to-moderate aphasia: some obvious loss of fluency or facility of comprehension, without significant limitation on ideas expressed or form of expression. Reduction of speech and/or comprehension, however, makes conversation. . . (see row details)  10. Dysarthria: 0-->Normal  11. Extinction and Inattention (formerly Neglect): 0-->No abnormality    Total (NIH Stroke Scale): 4    Assessment:  Hospital Problem List     * (Principal)Ischemic cardiomyopathy    Overview Addendum 6/1/2017 12:11 PM by KAN Fuchs     · EF 30%, has AICD PPM (DDD), Medtronic, follows with Dr. Pompa at [facility]  · Medtronic Gen change 5/26/17 Dr. Ho [facility]  · INR sub therapeutic at time of Gen. Change.         Left MCA CVA (cerebral vascular accident)    Overview Signed 6/1/2017 10:52 AM by MADELEINE Hutton     S/p thrombectomy 6/1/17         H/O CVA (cerebral vascular accident)    Overview Addendum 6/1/2017 10:54 AM by MADELEINE Hutton     apical thrombus on chronic coumadin, subtherapeutic today         H/O Non-sustained ventricular tachycardia    Overview Addendum 6/1/2017 12:23 PM by KAN Fuchs     · ICD Shock 5/25/17 with report of slow  bpm per Dr. Ho/ Amiodarone          Coronary artery disease    Overview Addendum 6/1/2017 12:27 PM by Willis  KAN Tripp     · CABG 2000 LIMA to LAD  · Redo CABG 2016 Hattie to PDA, SVG to D1, and SVG to OM1 and OM2  · Recent NONSTEMI at [facility] Dr. Josiah Bedolla 2/16 revealing Patent LIMA to diffusely diseased LAD and Three vessel disease treated with Medical therapy recommended.   · Recurrent VT: [facility] Dr. Magallanes 2/17 Stent placed in SVG to OM1 and OM2/ Amiodarone therapy  · Recurrent VT: [facility] admit with [facility]: Medical therapy 5/25/17  · Echocardiogram EF 45% 5/17 [facility]         Diabetes mellitus    Hyperlipidemia    Hypertension    PAF (paroxysmal atrial fibrillation)    Overview Signed 6/1/2017 12:30 PM by KAN Fuchs     · Chadsvasc=7  · Comadin Therapy               Plan:   ICU admission  Ischemic CVA order set  Obtain H&P and d/c summary from [facility]    MADELEINE Lehman, AGACNP-BC, FNP-BC  Pulmonary & Critical Care Medicine    Krish Hamm MD  Pulmonary and Critical Care Medicine  06/01/17 4:16 PM        Time: Critical care 40 min

## 2017-06-01 NOTE — ED PROVIDER NOTES
Subjective   HPI Comments: This patient is a 77-year-old gentleman who is recently discharged from [facility] here in [place] with pacemaker placement, according to EMS report.  EMS indicated that the patient evidently woke up this morning with signs of a stroke.  Initially we were told that they did not know the patient's last known normal, but we were told via radio and I confirmed with the EMS team that the patient's last known normal was last night upon going to bed with his wife.  The patient has no known history of stroke.  He does have a significant cardiac history.  Medications have been provided to us and include aspirin.  The patient is nonverbal here and unable to give a personal history of recent events.  The patient does follow commands and appears to be understanding of verbal instruction.  According to the report from EMS, the patient went to bed without any complaints last night and woke up today without the ability to speak.  Family is not here the bedside to confirm or add to the aforementioned history, but I'm hopeful that they will arrive shortly.  Any important additive comments will be included.    Past Medical History: Myocardial Infarction, CABG, Diabetes Mellitus, Hypertension, Hyperlipidemia, GERD    Past Family History: Unable to obtain secondary to the patient's medical status here and nonverbal status.    Patient is a 77 y.o. male presenting with neurologic complaint.   History provided by:  Patient  History limited by:  Patient nonverbal  Neurologic Problem   Primary symptoms comment: The patient woke up this morning with an inability to speak. He has remained nonverbal since. . This is a new problem. The current episode started today. Onset quality: Unknown at this time. The last time the patient was known to be well was 5/31/2017 9:00 PM.  The problem is unchanged. There was no focality noted. (Inability to speak. ) Past treatments include nothing.       Review of Systems    Unable to perform ROS: Patient nonverbal   Neurological: Positive for speech difficulty (The patient woke up nonverbal this morning).       Past Medical History:   Diagnosis Date   • Anxiety    • Arnold-Chiari malformation, type I     followed by Dr. Martinez, but last note available was 2014   • BPH (benign prostatic hyperplasia)    • Chronic Pleural effusion on right    • Complex partial seizure     followed by Dr. Martinez, but last note available was 2014   • Congestive heart failure     follows with Dr. Pompa at [facility], EF 30%   • Coronary artery disease     on ASA/plavix   • CVA (cerebral vascular accident)     apical thrombus on chronic coumadin   • Diabetes mellitus    • ED (erectile dysfunction)    • GERD (gastroesophageal reflux disease)    • Hyperlipidemia    • Hypertension    • Ischemic cardiomyopathy    • Myocardial infarction    • Non-sustained ventricular tachycardia        Allergies   Allergen Reactions   • Latex Rash       Past Surgical History:   Procedure Laterality Date   • CARDIAC CATHETERIZATION  02/2016    [facility], 3 vessel disease, patent LIMA to LAD bypass graft    • CARDIAC CATHETERIZATION  05/25/2017    [facility], severe 3 vessel disease, patent LIMA to LAD bypass graft, recommend medical management    • CARDIAC DEFIBRILLATOR PLACEMENT  08/2009    w/ PPM Medtronic (DDD)   • COLONOSCOPY W/ BIOPSIES     • CORONARY ANGIOPLASTY WITH STENT PLACEMENT     • CORONARY ARTERY BYPASS GRAFT  2000    [facility]   • INSERT / REPLACE / REMOVE PACEMAKER  05/26/2017    Medtronic generator change, Dr. Ho, [facility]        Family History   Problem Relation Age of Onset   • Diabetes Mother    • Hypertension Mother    • Heart disease Mother    • Heart disease Father    • Stroke Father    • Heart disease Sister        Social History     Social History   • Marital status:      Spouse name: N/A   • Number of children: N/A   • Years of education: N/A     Occupational History   •  Retired     Social History Main Topics   •  Smoking status: Never Smoker   • Smokeless tobacco: Never Used   • Alcohol use No   • Drug use: No   • Sexual activity: Defer      Comment:      Other Topics Concern   • None     Social History Narrative   • None         Objective   Physical Exam   Constitutional: He appears well-developed.  Non-toxic appearance. No distress.   The patient is nonverbal. Follows commands.   HENT:   Head: Normocephalic and atraumatic.   Right Ear: Tympanic membrane, external ear and ear canal normal.   Left Ear: Tympanic membrane, external ear and ear canal normal.   Nose: Nose normal. No nasal septal hematoma.   Mouth/Throat: Oropharynx is clear and moist. Mucous membranes are not dry. No oral lesions. No trismus in the jaw. No dental abscesses or uvula swelling. No posterior oropharyngeal erythema or tonsillar abscesses. No tonsillar exudate.   Eyes: EOM are normal. Pupils are equal, round, and reactive to light. Right conjunctiva is not injected. Left conjunctiva is not injected.   Neck: Normal range of motion. Neck supple. No JVD present. No tracheal tenderness present. No rigidity. Normal range of motion present.   Cardiovascular: Normal rate, regular rhythm and normal heart sounds.  Exam reveals no gallop and no friction rub.    Pulmonary/Chest: Effort normal and breath sounds normal. He has no wheezes. He has no rales. He exhibits no tenderness.   Abdominal: Soft. Bowel sounds are normal. He exhibits no distension, no pulsatile midline mass and no mass. There is no tenderness. There is no rigidity, no rebound, no guarding and no tenderness at McBurney's point.   No signs of acute abdomen. No Mcburney's point tenderness. No pulsatile abdominal mass   Musculoskeletal: Normal range of motion. He exhibits no edema, tenderness or deformity.   Lymphadenopathy:     He has no cervical adenopathy.   Neurological: He has normal strength. He displays no tremor. No cranial nerve deficit.   4/5 strength bilaterally with flexion and  extension of fingers, wrist, elbows, knees and hips as well as plantar and dorsiflexion of the foot. Able to lift lower extremities bilaterally. Nonverbal. No facial asymmetry. Follows commands. Moves all four extremities. No obvious focal deficits.    Skin: Skin is warm and dry. No rash noted. No erythema.   Psychiatric: His speech is normal. He is attentive.   Nursing note and vitals reviewed.      Critical Care  Performed by: MELANIE DE LA TORRE  Authorized by: MELANIE DE LA TORRE   Total critical care time: 60 minutes  Critical care time was exclusive of separately billable procedures and treating other patients.  Critical care was necessary to treat or prevent imminent or life-threatening deterioration of the following conditions: CNS failure or compromise.  Critical care was time spent personally by me on the following activities: discussions with consultants, evaluation of patient's response to treatment, obtaining history from patient or surrogate, ordering and review of laboratory studies, pulse oximetry, review of old charts, re-evaluation of patient's condition, ordering and review of radiographic studies, ordering and performing treatments and interventions, examination of patient and development of treatment plan with patient or surrogate.               ED Course  ED Course   Comment By Time   I have discussed the case with the EMS crew from [facility].  I let them know that the patient's symptoms and time of onset would negate any opportunity for TPA and they seemed to understand.  I'm concerned that the patient's wife may not know that the patient was brought to [facility], as the patient was recently discharged from [facility] here in [place].  We will attempt to get in touch with her to let her know of EMS determination to bring him here.  I talked to the patient about my concerns for stroke.  A CT perfusion of the head is currently being performed.  I reviewed the patient's CT scan  personally and do not see any signs of bleed.  He is not a TPA candidate based on the fact that his last known time with normal activity was last night.  I do believe patient will require admission for neurology evaluation and workup.  Plan to consult them while he is here in the emergency department. Akira Uribe MD 06/01 0734   Paged Dr. Smallwood, Neurology. Gaby R Harvey 06/01 0738   Spoke with Dr. Gurdeep Yusuf, Radiology who reports the CT Head is negative on the patient. Gaby R Harvey 06/01 0749   Paged Dr. Hogan, Neurology due to no collin back form Dr. Smallwood. Gaby R Harvey 06/01 0810   Spoke with Dr. Gurdeep Yusuf, Radiology who reports the study does show abnormalities in the left anterior cerebral region. Gaby R Harvey 06/01 0813   Paged Jaja Cabello. Also awaiting call back from Dr. Hogan and Dr. Rodrigez. Gaby R Harvey 06/01 0820   Spoke with Dr. Hogan, Neurology. Gaby R Harvey 06/01 0821   Dr. Rodrigez called back. He is currently in the OR. Gaby R Harvey 06/01 0825   I discussed the case personally with Jaja Cabello, from the neuro interventional team.  The CT perfusion study was concerning for a left anterior MCA defect.  I discussed personally with Dr. Hogan from neurology as well as Jaja Cabello, as mentioned above.  Dr. Wil Rodrigez was also made aware, but was in the middle of the procedure.  Jaja Cabello has arrived at the bedside and the patient will be taken to the Cath Lab with Dr. Aydin Isbell.  The patient's wife is still not here in the emergency department, but we will make every effort to contact her and/or let her know of the patient's movement once she arrives.  It should be noted that the NIH stroke scale was 10 at initial evaluation.  The laboratory studies currently included troponin of 1.04, CBC that is unremarkable, a creatinine of 1.4, and a potassium of 3.3.  INR is 1.1.  Patient will be taken to the Cath Lab and I'm currently paging the  intensivist, Dr. Mensah for admission.  Medical care time on this patient 60 minutes including management of the patient's presentation, determination of studies, consultation with specialists, and time at the bedside. Akira Uribe MD 06/01 0835   Patient will be admitted to Dr. Mensah with an impression that includes acute CVA, dysarthria, recent MI by report. Akira Uribe MD 06/01 0836   Spoke with Dr. Smallwood, who states he is not on call at this time. Gaby Harvey 06/01 0855     Recent Results (from the past 24 hour(s))   POC Protime / INR    Collection Time: 06/01/17  7:34 AM   Result Value Ref Range    Protime 13.5 12.8 - 15.2 seconds    INR 1.1 0.8 - 1.2   POC CHEM 8    Collection Time: 06/01/17  7:35 AM   Result Value Ref Range    Glucose 115 70 - 130 mg/dL    BUN, Arterial 16 8 - 26 mg/dL    Creatinine 1.40 (H) 0.60 - 1.30 mg/dL    Sodium 142 138 - 146 mmol/L    Potassium 3.3 (L) 3.5 - 4.9 mmol/L    Chloride 98 98 - 109 mmol/L    Total CO2 27 24 - 29 mmol/L    Hemoglobin 12.9 12.0 - 17.0 g/dL    Hematocrit 38 38 - 51 %    Ionized Calcium 1.19 (L) 1.20 - 1.32 mmol/L   POC Troponin, Rapid    Collection Time: 06/01/17  7:45 AM   Result Value Ref Range    Troponin I 1.04 (C) 0.00 - 0.07 ng/mL   Light Blue Top    Collection Time: 06/01/17  7:49 AM   Result Value Ref Range    Extra Tube hold for add-on    Green Top (Gel)    Collection Time: 06/01/17  7:49 AM   Result Value Ref Range    Extra Tube Hold for add-ons.    Lavender Top    Collection Time: 06/01/17  7:49 AM   Result Value Ref Range    Extra Tube hold for add-on    Gold Top - SST    Collection Time: 06/01/17  7:49 AM   Result Value Ref Range    Extra Tube Hold for add-ons.    CBC Auto Differential    Collection Time: 06/01/17  7:49 AM   Result Value Ref Range    WBC 6.54 3.50 - 10.80 10*3/mm3    RBC 4.68 4.20 - 5.76 10*6/mm3    Hemoglobin 12.0 (L) 13.1 - 17.5 g/dL    Hematocrit 37.9 (L) 38.9 - 50.9 %    MCV 81.0 80.0 - 99.0 fL    MCH  25.6 (L) 27.0 - 31.0 pg    MCHC 31.7 (L) 32.0 - 36.0 g/dL    RDW 15.6 (H) 11.3 - 14.5 %    RDW-SD 45.5 37.0 - 54.0 fl    MPV 10.6 6.0 - 12.0 fL    Platelets 235 150 - 450 10*3/mm3    Neutrophil % 65.2 41.0 - 71.0 %    Lymphocyte % 20.9 (L) 24.0 - 44.0 %    Monocyte % 9.5 0.0 - 12.0 %    Eosinophil % 3.8 (H) 0.0 - 3.0 %    Basophil % 0.3 0.0 - 1.0 %    Immature Grans % 0.3 0.0 - 0.6 %    Neutrophils, Absolute 4.26 1.50 - 8.30 10*3/mm3    Lymphocytes, Absolute 1.37 0.60 - 4.80 10*3/mm3    Monocytes, Absolute 0.62 0.00 - 1.00 10*3/mm3    Eosinophils, Absolute 0.25 0.10 - 0.30 10*3/mm3    Basophils, Absolute 0.02 0.00 - 0.20 10*3/mm3    Immature Grans, Absolute 0.02 0.00 - 0.03 10*3/mm3   AST    Collection Time: 06/01/17  8:18 AM   Result Value Ref Range    AST (SGOT) 16 0 - 33 U/L   ALT    Collection Time: 06/01/17  8:18 AM   Result Value Ref Range    ALT (SGPT) 13 7 - 40 U/L     Note: In addition to lab results from this visit, the labs listed above may include labs taken at another facility or during a different encounter within the last 24 hours. Please correlate lab times with ED admission and discharge times for further clarification of the services performed during this visit.    XR Chest 1 View   Final Result   1. Mild atelectatic volume reduction at the lung bases.   2. Otherwise no active disease.       D:  06/01/2017   E:  06/01/2017       This report was finalized on 6/1/2017 1:25 PM by Dr. Shar Pascual MD.          CT Cerebral Perfusion With & Without Contrast   Final Result   Abnormal   Significant size areas of abnormal perfusion in the left cerebral hemisphere as    described with evidence of significant ischemic penumbra.   Critical results:   THIS REPORT CONTAINS FINDINGS THAT MAY BE CRITICAL TO PATIENT CARE. The    findings were verbally communicated via telephone conference with MELANIE DE LA TORRE at 8:10 AM EDT on 6/1/2017. The findings were acknowledged and    understood.      THIS DOCUMENT HAS  BEEN ELECTRONICALLY SIGNED BY ANA FREY MD      CT Head Without Contrast   Final Result   Abnormal   1. No acute intracranial process identified.      The findings were discussed with Dr. Uribe on 6/1/2017 7:47 AM EDT.      THIS DOCUMENT HAS BEEN ELECTRONICALLY SIGNED BY CLAUDIA MARTINEZ MD      CT Head Without Contrast    (Results Pending)     Vitals:    06/01/17 1230 06/01/17 1415 06/01/17 1430 06/01/17 1445   BP: 128/61 134/69 136/74 139/65   BP Location:       Patient Position:       Pulse: 60 62 59 60   Resp:       Temp:       TempSrc:       SpO2: 97% 96% 97% 97%   Weight:       Height:         Medications   sodium chloride 0.9 % flush 10 mL ( Intravenous MAR Hold 6/1/17 0844)   sodium chloride 0.9 % flush 1-10 mL (not administered)   sodium chloride 0.9 % infusion (75 mL/hr Intravenous New Bag 6/1/17 1000)   niCARdipine (CARDENE) 25 mg/250 mL (0.1 mg/mL) 0.9% NS VTB infusion (5 mg/hr Intravenous Rate/Dose Change 6/1/17 1100)   phenylephrine (JUSTICE-SYNEPHRINE) 50,000 mcg in 250 mL (200 mcg/mL) infusion (0.5 mcg/kg/min × 73.9 kg Intravenous Not Given 6/1/17 1031)   aspirin tablet 325 mg (not administered)     Or   aspirin suppository 300 mg (not administered)   ondansetron (ZOFRAN) injection 4 mg (not administered)   atorvastatin (LIPITOR) tablet 40 mg (not administered)   acetaminophen (TYLENOL) tablet 650 mg (650 mg Oral Given 6/1/17 1141)   amiodarone (PACERONE) tablet 200 mg (not administered)   iopamidol (ISOVUE-370) 76 % injection 50 mL (40 mL Intravenous Given 6/1/17 0745)     ECG/EMG Results (last 24 hours)     Procedure Component Value Units Date/Time    ECG 12 Lead [[id number]] Collected:  06/01/17 0746     Updated:  06/01/17 0809    Narrative:       Test Reason : Stroke Protocol (onset > 12 hrs)  Blood Pressure : **/** mmHG  Vent. Rate : 061 BPM     Atrial Rate : 061 BPM     P-R Int : 146 ms          QRS Dur : 198 ms      QT Int : 554 ms       P-R-T Axes : 000 -81 050 degrees     QTc Int : 557  ms    Electronic atrial pacemaker  Left axis deviation  Right bundle branch block  Inferior infarct , age undetermined  Anterolateral infarct , age undetermined  Abnormal ECG  No previous ECGs available  Confirmed by AKIRA URIBE MD ([id number]) on 6/1/2017 8:08:59 AM    Referred By:  BRITT           Confirmed By:AKIRA URIBE MD                          MDM  Number of Diagnoses or Management Options  Cerebrovascular accident (CVA), unspecified mechanism:   Dysarthria:   Elevated serum creatinine:   Elevated troponin:   Critical Care  Total time providing critical care: 30-74 minutes      Final diagnoses:   Cerebrovascular accident (CVA), unspecified mechanism   Dysarthria   Elevated troponin   Elevated serum creatinine   ST elevation myocardial infarction (STEMI), unspecified artery     EMR Dragon/Transcription disclaimer:   Much of this encounter note is an electronic transcription/translation of spoken language to printed text. The electronic translation of spoken language may permit erroneous, or at times, nonsensical words or phrases to be inadvertently transcribed; Although I have reviewed the note for such errors, some may still exist.       Documentation assistance provided by fatmata Harvey.  Information recorded by the fatmata was done at my direction and has been verified and validated by me.     Gaby R Harvey  06/01/17 0732       Gaby R Harvey  06/01/17 0735       Gaby R Harvey  06/01/17 0743       Gaby R Harvey  06/01/17 0745       Gaby R Harvey  06/01/17 0811       Gaby R Harevy  06/01/17 0816       Gaby R Harvey  06/01/17 0834       Gaby R Harvey  06/01/17 0837       Gaby R Harvey  06/01/17 0957       Akira Uribe MD  06/01/17 [id number]

## 2017-06-01 NOTE — PLAN OF CARE
"Neurointerventional Metrics    Last Known Normal:  \"wake up\" CVA    [facility] Arrival:  0723    Initial NIHSS: 10    Baseline MRS:  0    IV tPA:  No (>4.5 hrs since LKW), patient did receive 4mg Intra-arterial tPA    CT Head: 0730    CT Perfusion: 0746    Arrival to Cath Lab:  0841    Groin Access: 0857    Guide Catheter Placement:  0907    Microcatheter Placement:  0910    Microcatheter Injection:  0912    Stent Retriever Deployment:  0915    Stent Retriever Removal: 0922    Reperfusion:  0922    Final Angiogram:  0923    End of Procedure:  0931    Initial TICI flow:  2a    Final TICI flow:  3  "

## 2017-06-01 NOTE — CONSULTS
CARDIOLOGY CONSULT  Patient Care Team:  Shar Obregon MD as PCP - General  Referring Provider: Dr. Calderon   Reason for Consultation: ICM    Principal Problem:    Ischemic cardiomyopathy  Active Problems:    H/O Non-sustained ventricular tachycardia    Coronary artery disease    PAF (paroxysmal atrial fibrillation)    Left MCA CVA (cerebral vascular accident)    H/O CVA (cerebral vascular accident)    Diabetes mellitus    Hyperlipidemia    Hypertension      Active Ambulatory Problems     Diagnosis Date Noted   • Arnold-Chiari malformation, type I 07/16/2016   • Complex partial epilepsy 07/16/2016   • Apoplectic attack 07/16/2016   • H/O: CVA (cerebrovascular accident) 06/01/2017     Resolved Ambulatory Problems     Diagnosis Date Noted   • No Resolved Ambulatory Problems     Past Medical History:   Diagnosis Date   • Anxiety    • Arnold-Chiari malformation, type I    • BPH (benign prostatic hyperplasia)    • Chronic Pleural effusion on right    • Complex partial seizure    • Congestive heart failure    • Coronary artery disease    • CVA (cerebral vascular accident)    • Diabetes mellitus    • ED (erectile dysfunction)    • GERD (gastroesophageal reflux disease)    • Hyperlipidemia    • Hypertension    • Ischemic cardiomyopathy    • Myocardial infarction    • Non-sustained ventricular tachycardia      Past Surgical History:   Procedure Laterality Date   • CARDIAC CATHETERIZATION  02/2016    [facility], 3 vessel disease, patent LIMA to LAD bypass   • CARDIAC CATHETERIZATION  05/25/2017    [facility], no cath record   • CARDIAC DEFIBRILLATOR PLACEMENT  08/2009    w/ PPM Medtronic (DDD)   • COLONOSCOPY W/ BIOPSIES     • CORONARY ANGIOPLASTY WITH STENT PLACEMENT     • CORONARY ARTERY BYPASS GRAFT  2000    [facility]   • INSERT / REPLACE / REMOVE PACEMAKER      Medtronic generator change, Dr. Ho, [facility] 5/26/17       Prescriptions Prior to Admission   Medication Sig Dispense Refill Last Dose   • ALPRAZolam (XANAX) 0.5 MG tablet TAKE 1/2-1  TABLET BY MOUTH DAILY AS NEEDED  0 Taking   • amLODIPine (NORVASC) 10 MG tablet Take  by mouth daily.   Taking   • amoxicillin-clavulanate (AUGMENTIN) 875-125 MG per tablet Take 1 tablet by mouth 2 (Two) Times a Day. 14 tablet 0 Not Taking   • aspirin 81 MG tablet Take  by mouth daily.   Taking   • atorvastatin (LIPITOR) 40 MG tablet    Taking   • carvedilol (COREG) 25 MG tablet Take  by mouth 2 (two) times a day.   Taking   • carvedilol (COREG) 6.25 MG tablet    Not Taking   • clopidogrel (PLAVIX) 75 MG tablet Take 75 mg by mouth Daily.   Taking   • clotrimazole-betamethasone (LOTRISONE) 1-0.05 % cream APPLY TWICE A DAY TO RASH ON LEFT ANKLE  0 Taking   • ferrous gluconate (FERGON) 324 MG tablet TAKE 1 TABLET EVERY DAY  0 Not Taking   • Ferrous Gluconate 325 (36 FE) MG tablet TAKE 1 TABLET EVERY DAY  1 Taking   • furosemide (LASIX) 40 MG tablet Take  by mouth daily.   Taking   • lamoTRIgine (LaMICtal) 150 MG tablet Take  by mouth 2 (two) times a day.   Taking   • lisinopril (PRINIVIL,ZESTRIL) 30 MG tablet Take  by mouth daily.   Taking   • metFORMIN (GLUCOPHAGE) 500 MG tablet Take  by mouth 2 (two) times a day.   Taking   • mupirocin (BACTROBAN) 2 % ointment APPLY SPARINGLY TWICE A DAY TO LESIONS ON HAND FOR 10 DAYS  0 Not Taking   • pantoprazole (PROTONIX) 40 MG EC tablet    Taking   • PARoxetine (PAXIL) 10 MG tablet TAKE 1 TABLET EVERY DAY  1 Taking   • potassium chloride (KLOR-CON) 20 MEQ CR tablet Take  by mouth daily.   Taking   • pravastatin (PRAVACHOL) 40 MG tablet Take  by mouth daily.   Taking   • warfarin (COUMADIN) 5 MG tablet Take  by mouth daily.   Taking       History of present illness:    The patient is a 77-year-old white male seen in consultation regarding history of ischemic cardiomyopathy, chronic systolic heart failure, VT,remote ICD and recent stroke.  The patient is hard of hearing and is continuing to have difficulty with speech and the majority of the consult information is obtained from  the son who is at his bedside as well as old records in the chart.  The patient has extensive cardiac history as listed in the problem list including CABG and redo CABG and has been followed by Dr. Pompa at [facility].  In addition he has a known history of atrial fibrillation as well as remote PE and DVTs and is on chronic Coumadin.  Most recently he's had recurrent episodes of VT with admission to [facility] in February as well as May 25, 2017.  These episodes occurred with subsequent ICD shocks requiring admission.  In February 2017 he received a stent in the vein graft to the OM1 and OM2 per Dr. Magallanes.  Recently on 05/25/2017 he again had a slow VT and was admitted to [facility] with an ICD shock.  He underwent a left heart catheterization at this time but is reported he did not have an intervention.  His Coumadin was held and his INR was subtherapeutic in anticipation for a ICD generator change as it had reached SHAHRZAD.  This was completed on 05/26/2017 by Dr. Kearns.  This morning he was admitted to [facility] with acute CVA after the patient was found this morning with aphasia and right-sided weakness.  He underwent emergent thrombectomy per Dr. Rodrigez and is recovering at this time.  His speech is slow but recovering he denies any chest pain.    Social History     Social History   • Marital status:      Spouse name: N/A   • Number of children: N/A   • Years of education: N/A     Occupational History   •  Retired     Social History Main Topics   • Smoking status: Never Smoker   • Smokeless tobacco: Never Used   • Alcohol use No   • Drug use: No   • Sexual activity: Defer      Comment:      Other Topics Concern   • Not on file     Social History Narrative   • No narrative on file     Family History   Problem Relation Age of Onset   • Diabetes Mother    • Hypertension Mother    • Heart disease Mother    • Heart disease Father    • Stroke Father    • Heart disease Sister   "    Past Surgical History:   Procedure Laterality Date   • CARDIAC CATHETERIZATION  02/2016    [facility], 3 vessel disease, patent LIMA to LAD bypass   • CARDIAC CATHETERIZATION  05/25/2017    [facility], no cath record   • CARDIAC DEFIBRILLATOR PLACEMENT  08/2009    w/ PPM Medtronic (DDD)   • COLONOSCOPY W/ BIOPSIES     • CORONARY ANGIOPLASTY WITH STENT PLACEMENT     • CORONARY ARTERY BYPASS GRAFT  2000    [facility]   • INSERT / REPLACE / REMOVE PACEMAKER      Medtronic generator change, Dr. Ho, [facility] 5/26/17       Review of Systems:   Pertinent items are noted in HPI, all other systems reviewed and negative  Objective     Vitals:  Blood pressure 128/61, pulse 60, temperature 97.8 °F (36.6 °C), temperature source Oral, resp. rate 18, height 74\" (188 cm), weight 163 lb (73.9 kg), SpO2 97 %.     Intake/Output Summary (Last 24 hours) at 06/01/17 1245  Last data filed at 06/01/17 1200   Gross per 24 hour   Intake                0 ml   Output              275 ml   Net             -275 ml       Physical Exam:     General Appearance:    Alert, cooperative, heard of hearing,in no acute distress   Head:    Normocephalic, without obvious abnormality, atraumatic   Eyes:            Lids and lashes normal, conjunctivae and sclerae normal, no   icterus, no pallor, corneas clear, PERRLA   Ears:    Ears appear intact with no abnormalities noted   Throat:   No oral lesions, no thrush, oral mucosa moist   Neck:   No adenopathy, supple, trachea midline, no thyromegaly, no   carotid bruit, no JVD   Back:     No kyphosis present, no scoliosis present, no skin lesions,      erythema or scars, no tenderness to percussion or                   palpation,   range of motion normal   Lungs:     Clear to auscultation,respirations regular, even and                  unlabored    Heart:    Regular rhythm and normal rate, normal S1 and S2, no            murmur, no gallop, no rub, no click   Chest Wall:    No abnormalities observed   Abdomen:     Normal bowel " sounds, no masses, no organomegaly, soft        non-tender, non-distended, no guarding, no rebound                tenderness   Rectal:     Deferred   Extremities:   Right sided weakness, no edema, no cyanosis, no             redness   Pulses:   Pulses palpable and equal bilaterally   Skin:   No bleeding, bruising or rash   Lymph nodes:   No palpable adenopathy   Neurologic:   Aphasia right sided weakness,         Results Review:     I reviewed the patient's new clinical results.      Results from last 7 days  Lab Units 06/01/17  0749   WBC 10*3/mm3 6.54   HEMOGLOBIN g/dL 12.0*   HEMATOCRIT % 37.9*   PLATELETS 10*3/mm3 235       Results from last 7 days  Lab Units 06/01/17  0818 06/01/17  0735   CREATININE mg/dL  --  1.40*   ALT (SGPT) U/L 13  --    AST (SGOT) U/L 16  --        Results from last 7 days  Lab Units 06/01/17  0735   CREATININE mg/dL 1.40*       Results from last 7 days  Lab Units 06/01/17  0734   INR  1.1     No components found for: TROPONIN              Lab Results   Component Value Date    CKTOTAL 36 04/18/2016    CKMB 0.8 04/18/2016    CKMBINDEX  04/18/2016      Comment:      The relative index is statistically unreliable  if the CK is < or equal to 40 U/L.      TROPONINI 0.020 02/09/2017     Tele:  AV paced      ASSESSMENT:  Hospital Problem List     * (Principal)Ischemic cardiomyopathy    Overview Addendum 6/1/2017 12:11 PM by KAN Fuchs     · EF 30%, has AICD PPM (DDD), Medtronic, follows with Dr. Pompa at [facility]  · Medtronic Gen change 5/26/17 Dr. Ho [facility]  · INR sub therapeutic at time of Gen. Change.         H/O Non-sustained ventricular tachycardia    Overview Addendum 6/1/2017 12:23 PM by KAN Fuchs     · ICD Shock 5/25/17 with report of slow  bpm per Dr. Ho/ Amiodarone          Coronary artery disease    Overview Addendum 6/1/2017 12:27 PM by KAN Fuchs     · CABG 2000 LIMA to LAD  · Redo CABG 2016 Hattie to PDA, SVG to D1, and SVG to OM1 and  OM2  · Recent NONSTEMI at [facility] Dr. Josiah Bedolla 2/16 revealing Patent LIMA to diffusely diseased LAD and Three vessel disease treated with Medical therapy recommended.   · Recurrent VT: [facility] Dr. Magallanes 2/17 Stent placed in SVG to OM1 and OM2/ Amiodarone therapy  · Recurrent VT: [facility] admit with [facility]: Medical therapy 5/25/17  · Echocardiogram EF 45% 5/17 [facility]         PAF (paroxysmal atrial fibrillation)    Overview Signed 6/1/2017 12:30 PM by KAN Fuchs     · Chadsvasc=7  · Comadin Therapy         Left MCA CVA (cerebral vascular accident)    Overview Signed 6/1/2017 10:52 AM by MADELEINE Hutton     S/p thrombectomy 6/1/17         H/O CVA (cerebral vascular accident)    Overview Addendum 6/1/2017 10:54 AM by MADELEINE Hutton     apical thrombus on chronic coumadin, subtherapeutic today         Diabetes mellitus    Hyperlipidemia    Hypertension                            PLAN:          ·  Interrogate ICD/ Echocardiogram  · Consider Eliquis when ok with Neurology  · Consider cardiac medications when taking PO, including Amiodarone.            This is Dr. Ray Zapata I personally reviewed all the patient's records and discussed the case with the patient his son and the physician extender Jhonny Dale.  I reviewed a heart catheter report from February which showed a patent LIMA to the LAD with lesions beyond the graft insertion with moderate disease of the vessels.  Cording to the patient he has had a stroke before and that was about 2007.  2005 at 2006 he had a DVT and PE was told to take warfarin indefinitely.  Came in with the above complaints of right arm neurolysis as well as aphasia.  He underwent an emergent angiogram with thrombectomy and is gained his speech back and is able to move his right arm.  As above the patient had his warfarin held for cardiac catheter as well as a I AICD pulse change change.  I talked to the patient's son and the patient about possibly be on Eliquis and the  son thinks that that was mentioned before but it was caused prohibitive for the patient.  On physical exam general he is a well-developed well-nourished white male no acute distress  Neuro he is no longer a phasic in its easy to understand his words seems to be word searching at times and is able to move his right arm  Cardiovascular regular rate and rhythm with a soft systolic ejection murmur and no edema  Respiratory equal bilateral symmetrical expansion overall clear to auscultation  HEENT tongue is midline no JVP and I do hear some retired noises in his carotids.  I agree that the patient will need to resume long-term anticoagulation.  Even if he's had no atrial fibrillation documented he has to be on lifelong anticoagulation anyway because of the prior DVT and PE.  Eliquis would be a great choice due to ease of onset as well as no monitoring the lab to see because the patient will be.  We'll also interrogate his device see if he's had any further VT any episodes of A. fib however still waiting for more records from [place].  Also waiting to see if the patient was sent out on amiodarone.  We'll continue to follow along with you  I discussed the patients findings and my recommendations with patient and family  Scribe for KAN Magdaleno  06/01/17  12:45 PM     I, Zak Zapata MD, personally performed the services described in this documentation as scribed by the above named individual in my presence, and it is both accurate and complete.  06/01/17 12:57 PM

## 2017-06-01 NOTE — CONSULTS
"NEUROSURGERY CONSULTATION    Referring Provider: No ref. provider found    Patient Care Team:  Shar Obregon MD as PCP - General    Chief Complaint: Left MCA syndrome     History of Present Illness: Patient with history of cardiac ablation recently on Coumadin non-therapeutic awoke with a phasic globally.  NIH stroke scale around 11.  Not a TPA candidate on Coumadin given \"wake-up status\".    Brought emergently to the Cath Lab      Review of Systems:  Musculoskeletal and Neurological systems were reviewed and are negative except for:  Musculoskeletal: positive for See HPI  Neurological: positive for See HPI   Unobtainable secondary to global aphasia    History:  Past Medical History:   Diagnosis Date   • Anxiety    • Arnold-Chiari malformation, type I     followed by Dr. Martinez, but last note available was 2014   • BPH (benign prostatic hyperplasia)    • Chronic Pleural effusion on right    • Complex partial seizure     followed by Dr. Martinez, but last note available was 2014   • Congestive heart failure     follows with Dr. Pompa at [facility], EF 30%   • Coronary artery disease     on ASA/plavix   • CVA (cerebral vascular accident)     apical thrombus on chronic coumadin   • Diabetes mellitus    • ED (erectile dysfunction)    • GERD (gastroesophageal reflux disease)    • Hyperlipidemia    • Hypertension    • Ischemic cardiomyopathy    • Myocardial infarction    • Non-sustained ventricular tachycardia    ,   Past Surgical History:   Procedure Laterality Date   • CARDIAC CATHETERIZATION  02/2016    [facility], 3 vessel disease, patent LIMA to LAD bypass graft    • CARDIAC CATHETERIZATION  05/25/2017    [facility], severe 3 vessel disease, patent LIMA to LAD bypass graft, recommend medical management    • CARDIAC DEFIBRILLATOR PLACEMENT  08/2009    w/ PPM Medtronic (DDD)   • COLONOSCOPY W/ BIOPSIES     • CORONARY ANGIOPLASTY WITH STENT PLACEMENT     • CORONARY ARTERY BYPASS GRAFT  2000    [facility]   • INSERT / REPLACE / REMOVE " PACEMAKER  05/26/2017    Medtronic generator change, Dr. Ho, [facility]    ,   Family History   Problem Relation Age of Onset   • Diabetes Mother    • Hypertension Mother    • Heart disease Mother    • Heart disease Father    • Stroke Father    • Heart disease Sister    ,   Social History   Substance Use Topics   • Smoking status: Never Smoker   • Smokeless tobacco: Never Used   • Alcohol use No   ,   Prescriptions Prior to Admission   Medication Sig Dispense Refill Last Dose   • ALPRAZolam (XANAX) 0.5 MG tablet TAKE 1/2-1 TABLET BY MOUTH DAILY AS NEEDED  0 Taking   • amiodarone (PACERONE) 200 MG tablet Take 200 mg by mouth Daily.      • amLODIPine (NORVASC) 10 MG tablet Take 10 mg by mouth Daily.   Taking   • atorvastatin (LIPITOR) 40 MG tablet Take 40 mg by mouth Every Night.   Taking   • carvedilol (COREG) 6.25 MG tablet Take 6.25 mg by mouth 2 (Two) Times a Day.   Not Taking   • clopidogrel (PLAVIX) 75 MG tablet Take 75 mg by mouth Daily.   Taking   • furosemide (LASIX) 40 MG tablet Take 40 mg by mouth Daily.   Taking   • lamoTRIgine (LaMICtal) 150 MG tablet Take 150 mg by mouth 2 (Two) Times a Day.   Taking   • metFORMIN (GLUCOPHAGE) 500 MG tablet Take 500 mg by mouth 2 (Two) Times a Day.   Taking   • potassium chloride (KLOR-CON) 20 MEQ CR tablet Take 20 mEq by mouth Daily.   Taking   • warfarin (COUMADIN) 5 MG tablet Take 5 mg by mouth Daily.   Taking   • aspirin 81 MG tablet Take  by mouth daily.   Taking   • clotrimazole-betamethasone (LOTRISONE) 1-0.05 % cream APPLY TWICE A DAY TO RASH ON LEFT ANKLE  0 Taking   • ferrous gluconate (FERGON) 324 MG tablet TAKE 1 TABLET EVERY DAY  0 Not Taking   • Ferrous Gluconate 325 (36 FE) MG tablet TAKE 1 TABLET EVERY DAY  1 Taking   • lisinopril (PRINIVIL,ZESTRIL) 30 MG tablet Take  by mouth daily.   Taking   • mupirocin (BACTROBAN) 2 % ointment APPLY SPARINGLY TWICE A DAY TO LESIONS ON HAND FOR 10 DAYS  0 Not Taking   • pantoprazole (PROTONIX) 40 MG EC tablet    Taking   •  "PARoxetine (PAXIL) 10 MG tablet TAKE 1 TABLET EVERY DAY  1 Taking    and Allergies:  Latex      Physical Exam:  Vital Signs: Blood pressure 137/69, pulse 59, temperature 98.5 °F (36.9 °C), temperature source Oral, resp. rate 18, height 74\" (188 cm), weight 163 lb (73.9 kg), SpO2 99 %.  Patient awake alert globally a phasic with gaze neglect  NIH stroke scale consistent with 10    Data Review:  Rapid CT perfusion noted    Diagnosis:  Left MCA syndrome    Treatment Recommendations: Recommend mechanical thrombectomy and diagnostic angiogram for \"wake-up stroke.  Obtained emergency consent Jaja aCbello from the family.      Wil Rodrigez MD  06/01/17  7:29 PM          "

## 2017-06-01 NOTE — THERAPY EVALUATION
Acute Care - Speech Language Pathology   Swallow Initial Evaluation [facility]     Patient Name: Pancho Bonner  : 1940  MRN: [id number]  Today's Date: 2017        Referring Physician: Elia      Admit Date: 2017    Visit Dx:     ICD-10-CM ICD-9-CM   1. Cerebrovascular accident (CVA), unspecified mechanism I63.9 434.91   2. Dysarthria R47.1 784.51   3. Elevated troponin R79.89 790.6   4. Elevated serum creatinine R79.89 790.99   5. ST elevation myocardial infarction (STEMI), unspecified artery I21.3 410.90     Patient Active Problem List   Diagnosis   • Arnold-Chiari malformation, type I   • Complex partial epilepsy   • Apoplectic attack   • Left MCA CVA (cerebral vascular accident)   • H/O: CVA (cerebrovascular accident)   • H/O CVA (cerebral vascular accident)   • Diabetes mellitus   • Ischemic cardiomyopathy   • Hyperlipidemia   • Hypertension   • H/O Non-sustained ventricular tachycardia   • Coronary artery disease   • PAF (paroxysmal atrial fibrillation)     Past Medical History:   Diagnosis Date   • Anxiety    • Arnold-Chiari malformation, type I     followed by Dr. Martinez, but last note available was    • BPH (benign prostatic hyperplasia)    • Chronic Pleural effusion on right    • Complex partial seizure     followed by Dr. Martinez, but last note available was    • Congestive heart failure     follows with Dr. Pompa at [facility], EF 30%   • Coronary artery disease     on ASA/plavix   • CVA (cerebral vascular accident)     apical thrombus on chronic coumadin   • Diabetes mellitus    • ED (erectile dysfunction)    • GERD (gastroesophageal reflux disease)    • Hyperlipidemia    • Hypertension    • Ischemic cardiomyopathy    • Myocardial infarction    • Non-sustained ventricular tachycardia      Past Surgical History:   Procedure Laterality Date   • CARDIAC CATHETERIZATION  2016    [facility], 3 vessel disease, patent LIMA to LAD bypass graft    • CARDIAC CATHETERIZATION  2017     [facility], severe 3 vessel disease, patent LIMA to LAD bypass graft, recommend medical management    • CARDIAC DEFIBRILLATOR PLACEMENT  08/2009    w/ PPM Medtronic (DDD)   • COLONOSCOPY W/ BIOPSIES     • CORONARY ANGIOPLASTY WITH STENT PLACEMENT     • CORONARY ARTERY BYPASS GRAFT  2000    [facility]   • INSERT / REPLACE / REMOVE PACEMAKER  05/26/2017    Medtronic generator change, [facility] Ortiz           SWALLOW EVALUATION (last 72 hours)      Swallow Evaluation       06/01/17 1500                Rehab Evaluation    Document Type evaluation  -AW        Subjective Information no complaints  -AW        General Information    Patient Profile Review yes  -AW        Onset of Illness/Injury 06/01/17  -AW        Date of Surgery 06/01/17  -AW        Referring Physician Elia  -AW        Subjective Patient Observations Pt awake and alert sitting up in bed  -AW        Pertinent History Of Current Problem thrombectomy this am  -AW        Current Diet Limitations NPO  -AW        Precautions/Limitations, Vision WFL  -AW        Precautions/Limitations, Hearing hearing impairment, left  -AW        Prior Level of Function- Communication functional in all spheres  -AW        Prior Level of Function- Swallowing no diet consistency restrictions  -AW        Plans/Goals Discussed With patient and family  -AW        Barriers to Rehab none identified  -AW        Clinical Impression    Patient's Goals For Discharge return home  -AW        FCM, Swallowing 7-->Level 7  -AW        Therapy Frequency evaluation only  -AW        SLP Diet Recommendation regular textures;thin liquids  -AW        SLP Rec. for Method of Medication Administration meds whole with thin liquid  -AW        Cognitive Assessment/Intervention    Current Cognitive/Communication Assessment functional  -AW        Orientation Status oriented x 4  -AW        Follows Commands/Answers Questions 100% of the time  -AW        Oral Motor Structure and Function    Oral Motor Anatomy and  Physiology patient demonstrates anatomy and physiology that is WNL  -AW        Dentition Assessment present and adequate  -AW        Secretion Management WNL/WFL  -AW        Mucosal Quality moist, healthy  -AW        Velar Elevation WFL (within functional limits)  -AW        Volitional Swallow no difficulties initiating volitional swallow  -AW        Volitional Cough no difficulties initiating volitional cough  -AW        Oral Musculature General Assessment WFL (within functional limits)  -AW        General Feeding/Swallowing Observations    Current Feeding Method NPO  -AW        General Swallow Observations    General Swallow Observations WFL  -AW        Clinical Swallow Exam    Oral Phase Results intact oral phase without signs of dysfunction  -AW        Pharyngeal Phase Results no signs/symptoms of pharyngeal impairment  -AW        Summary of Clinical Exam Pt exhibited functional swallow at bedside - no s/s pharyngeal dysphagia.   -AW        Swallow Recommendations    Recommended Diet regular textures;thin liquids  -AW          User Key  (r) = Recorded By, (t) = Taken By, (c) = Cosigned By    Initials Name Effective Dates    AW Mirlande Portillo MS CCC-SLP 06/22/15 -         EDUCATION  The patient has been educated in the following areas:   Dysphagia (Swallowing Impairment).    SLP Recommendation and Plan     SLP Diet Recommendation: regular textures, thin liquids     SLP Rec. for Method of Medication Administration: meds whole with thin liquid                 Therapy Frequency: evaluation only             Plan of Care Review  Plan Of Care Reviewed With: patient  Progress: improving  Outcome Summary/Follow up Plan: Pt passed swallow eval with no s/s pharyngeal dysphagia. MD came to see pt while SLP present. Pt was able to follow all commands and answer all questions. Pt told MD he feels almost back to normal, but stil a bit foggy. Explained to family that wilh his rate of improvement (beginning with no speech when  he woke up this morning), that we will wait until tomorrow to do a cognitive--linguistic evaluation. Family and pt in agreement. May resume reg/thin diet.              Time Calculation:         Time Calculation- SLP       06/01/17 1515          Time Calculation- SLP    SLP Start Time 1300  -AW      SLP Received On 06/01/17  -        User Key  (r) = Recorded By, (t) = Taken By, (c) = Cosigned By    Initials Name Provider Type     Mirlande Portillo MS CCC-SLP Speech and Language Pathologist          Therapy Charges for Today     Code Description Service Date Service Provider Modifiers Qty    [id number] HC ST EVAL ORAL PHARYNG SWALLOW 5 6/1/2017 Mirlande Portillo MS CCC-SLP GN 1               Mirlande Portillo MS CCC-SLP  6/1/2017

## 2017-06-01 NOTE — PLAN OF CARE
Problem: Patient Care Overview (Adult)  Goal: Plan of Care Review  Outcome: Ongoing (interventions implemented as appropriate)    06/01/17 1513   Coping/Psychosocial Response Interventions   Plan Of Care Reviewed With patient   Patient Care Overview   Progress improving   Outcome Evaluation   Outcome Summary/Follow up Plan Pt passed swallow eval with no s/s pharyngeal dysphagia. MD came to see pt while SLP present. Pt was able to follow all commands and answer all questions. Pt told MD he feels almost back to normal, but stil a bit foggy. Explained to family that wilh his rate of improvement (beginning with no speech when he woke up this morning), that we will wait until tomorrow to do a cognitive--linguistic evaluation. Family and pt in agreement. May resume reg/thin diet.

## 2017-06-02 ENCOUNTER — APPOINTMENT (OUTPATIENT)
Dept: CARDIAC REHAB | Facility: HOSPITAL | Age: 77
End: 2017-06-02

## 2017-06-02 ENCOUNTER — APPOINTMENT (OUTPATIENT)
Dept: CT IMAGING | Facility: HOSPITAL | Age: 77
End: 2017-06-02

## 2017-06-02 ENCOUNTER — APPOINTMENT (OUTPATIENT)
Dept: GENERAL RADIOLOGY | Facility: HOSPITAL | Age: 77
End: 2017-06-02

## 2017-06-02 LAB
ALBUMIN SERPL-MCNC: 3.6 G/DL (ref 3.2–4.8)
ALBUMIN/GLOB SERPL: 1.4 G/DL (ref 1.5–2.5)
ALP SERPL-CCNC: 62 U/L (ref 25–100)
ALT SERPL W P-5'-P-CCNC: 13 U/L (ref 7–40)
ANION GAP SERPL CALCULATED.3IONS-SCNC: 7 MMOL/L (ref 3–11)
ARTICHOKE IGE QN: 59 MG/DL (ref 0–130)
AST SERPL-CCNC: 18 U/L (ref 0–33)
BILIRUB SERPL-MCNC: 0.9 MG/DL (ref 0.3–1.2)
BNP SERPL-MCNC: 261 PG/ML (ref 0–100)
BUN BLD-MCNC: 11 MG/DL (ref 9–23)
BUN/CREAT SERPL: 11 (ref 7–25)
CALCIUM SPEC-SCNC: 8.7 MG/DL (ref 8.7–10.4)
CHLORIDE SERPL-SCNC: 107 MMOL/L (ref 99–109)
CHOLEST SERPL-MCNC: 109 MG/DL (ref 0–200)
CO2 SERPL-SCNC: 28 MMOL/L (ref 20–31)
CREAT BLD-MCNC: 1 MG/DL (ref 0.6–1.3)
DEPRECATED RDW RBC AUTO: 47.9 FL (ref 37–54)
ERYTHROCYTE [DISTWIDTH] IN BLOOD BY AUTOMATED COUNT: 15.9 % (ref 11.3–14.5)
GFR SERPL CREATININE-BSD FRML MDRD: 72 ML/MIN/1.73
GLOBULIN UR ELPH-MCNC: 2.5 GM/DL
GLUCOSE BLD-MCNC: 95 MG/DL (ref 70–100)
GLUCOSE BLDC GLUCOMTR-MCNC: 105 MG/DL (ref 70–130)
GLUCOSE BLDC GLUCOMTR-MCNC: 143 MG/DL (ref 70–130)
GLUCOSE BLDC GLUCOMTR-MCNC: 98 MG/DL (ref 70–130)
HBA1C MFR BLD: 5.6 % (ref 4.8–5.6)
HCT VFR BLD AUTO: 34 % (ref 38.9–50.9)
HDLC SERPL-MCNC: 31 MG/DL (ref 40–60)
HGB BLD-MCNC: 10.4 G/DL (ref 13.1–17.5)
MAGNESIUM SERPL-MCNC: 1.9 MG/DL (ref 1.3–2.7)
MCH RBC QN AUTO: 25.4 PG (ref 27–31)
MCHC RBC AUTO-ENTMCNC: 30.6 G/DL (ref 32–36)
MCV RBC AUTO: 82.9 FL (ref 80–99)
PHOSPHATE SERPL-MCNC: 2.5 MG/DL (ref 2.4–5.1)
PLATELET # BLD AUTO: 217 10*3/MM3 (ref 150–450)
PMV BLD AUTO: 10.8 FL (ref 6–12)
POTASSIUM BLD-SCNC: 3 MMOL/L (ref 3.5–5.5)
POTASSIUM BLD-SCNC: 4.6 MMOL/L (ref 3.5–5.5)
PROT SERPL-MCNC: 6.1 G/DL (ref 5.7–8.2)
RBC # BLD AUTO: 4.1 10*6/MM3 (ref 4.2–5.76)
SODIUM BLD-SCNC: 142 MMOL/L (ref 132–146)
TRIGL SERPL-MCNC: 64 MG/DL (ref 0–150)
WBC NRBC COR # BLD: 6.42 10*3/MM3 (ref 3.5–10.8)

## 2017-06-02 PROCEDURE — 82962 GLUCOSE BLOOD TEST: CPT

## 2017-06-02 PROCEDURE — 97165 OT EVAL LOW COMPLEX 30 MIN: CPT

## 2017-06-02 PROCEDURE — 97110 THERAPEUTIC EXERCISES: CPT

## 2017-06-02 PROCEDURE — 84132 ASSAY OF SERUM POTASSIUM: CPT | Performed by: NURSE PRACTITIONER

## 2017-06-02 PROCEDURE — 83880 ASSAY OF NATRIURETIC PEPTIDE: CPT | Performed by: INTERNAL MEDICINE

## 2017-06-02 PROCEDURE — 84100 ASSAY OF PHOSPHORUS: CPT | Performed by: INTERNAL MEDICINE

## 2017-06-02 PROCEDURE — 97162 PT EVAL MOD COMPLEX 30 MIN: CPT

## 2017-06-02 PROCEDURE — 99232 SBSQ HOSP IP/OBS MODERATE 35: CPT | Performed by: PHYSICIAN ASSISTANT

## 2017-06-02 PROCEDURE — 99232 SBSQ HOSP IP/OBS MODERATE 35: CPT | Performed by: INTERNAL MEDICINE

## 2017-06-02 PROCEDURE — 83735 ASSAY OF MAGNESIUM: CPT | Performed by: INTERNAL MEDICINE

## 2017-06-02 PROCEDURE — 99291 CRITICAL CARE FIRST HOUR: CPT | Performed by: INTERNAL MEDICINE

## 2017-06-02 PROCEDURE — 83036 HEMOGLOBIN GLYCOSYLATED A1C: CPT | Performed by: NURSE PRACTITIONER

## 2017-06-02 PROCEDURE — 80053 COMPREHEN METABOLIC PANEL: CPT | Performed by: NURSE PRACTITIONER

## 2017-06-02 PROCEDURE — 71010 HC CHEST PA OR AP: CPT

## 2017-06-02 PROCEDURE — 70450 CT HEAD/BRAIN W/O DYE: CPT

## 2017-06-02 PROCEDURE — 80061 LIPID PANEL: CPT | Performed by: NURSE PRACTITIONER

## 2017-06-02 PROCEDURE — 85027 COMPLETE CBC AUTOMATED: CPT | Performed by: NURSE PRACTITIONER

## 2017-06-02 RX ORDER — CARVEDILOL 6.25 MG/1
6.25 TABLET ORAL EVERY 12 HOURS SCHEDULED
Status: DISCONTINUED | OUTPATIENT
Start: 2017-06-02 | End: 2017-06-03 | Stop reason: HOSPADM

## 2017-06-02 RX ORDER — POTASSIUM CHLORIDE 1.5 G/1.77G
40 POWDER, FOR SOLUTION ORAL AS NEEDED
Status: DISCONTINUED | OUTPATIENT
Start: 2017-06-02 | End: 2017-06-03 | Stop reason: HOSPADM

## 2017-06-02 RX ORDER — POTASSIUM CHLORIDE 750 MG/1
40 CAPSULE, EXTENDED RELEASE ORAL AS NEEDED
Status: DISCONTINUED | OUTPATIENT
Start: 2017-06-02 | End: 2017-06-03 | Stop reason: HOSPADM

## 2017-06-02 RX ORDER — MAGNESIUM SULFATE HEPTAHYDRATE 40 MG/ML
2 INJECTION, SOLUTION INTRAVENOUS AS NEEDED
Status: DISCONTINUED | OUTPATIENT
Start: 2017-06-02 | End: 2017-06-03 | Stop reason: HOSPADM

## 2017-06-02 RX ORDER — POTASSIUM CHLORIDE 750 MG/1
40 CAPSULE, EXTENDED RELEASE ORAL ONCE
Status: DISCONTINUED | OUTPATIENT
Start: 2017-06-02 | End: 2017-06-03 | Stop reason: HOSPADM

## 2017-06-02 RX ORDER — POTASSIUM CHLORIDE 7.45 MG/ML
10 INJECTION INTRAVENOUS
Status: DISCONTINUED | OUTPATIENT
Start: 2017-06-02 | End: 2017-06-03 | Stop reason: HOSPADM

## 2017-06-02 RX ORDER — LAMOTRIGINE 100 MG/1
150 TABLET ORAL EVERY 12 HOURS SCHEDULED
Status: DISCONTINUED | OUTPATIENT
Start: 2017-06-02 | End: 2017-06-03 | Stop reason: HOSPADM

## 2017-06-02 RX ORDER — POTASSIUM CHLORIDE 750 MG/1
20 CAPSULE, EXTENDED RELEASE ORAL DAILY
Status: DISCONTINUED | OUTPATIENT
Start: 2017-06-03 | End: 2017-06-03 | Stop reason: HOSPADM

## 2017-06-02 RX ORDER — MAGNESIUM SULFATE HEPTAHYDRATE 40 MG/ML
4 INJECTION, SOLUTION INTRAVENOUS AS NEEDED
Status: DISCONTINUED | OUTPATIENT
Start: 2017-06-02 | End: 2017-06-03 | Stop reason: HOSPADM

## 2017-06-02 RX ORDER — LISINOPRIL 20 MG/1
20 TABLET ORAL
Status: DISCONTINUED | OUTPATIENT
Start: 2017-06-02 | End: 2017-06-03 | Stop reason: HOSPADM

## 2017-06-02 RX ORDER — FUROSEMIDE 10 MG/ML
40 INJECTION INTRAMUSCULAR; INTRAVENOUS
Status: DISCONTINUED | OUTPATIENT
Start: 2017-06-03 | End: 2017-06-03 | Stop reason: HOSPADM

## 2017-06-02 RX ORDER — PANTOPRAZOLE SODIUM 40 MG/1
40 TABLET, DELAYED RELEASE ORAL
Status: DISCONTINUED | OUTPATIENT
Start: 2017-06-03 | End: 2017-06-03 | Stop reason: HOSPADM

## 2017-06-02 RX ORDER — AMLODIPINE BESYLATE 10 MG/1
10 TABLET ORAL
Status: DISCONTINUED | OUTPATIENT
Start: 2017-06-02 | End: 2017-06-03 | Stop reason: HOSPADM

## 2017-06-02 RX ADMIN — AMIODARONE HYDROCHLORIDE 200 MG: 200 TABLET ORAL at 08:11

## 2017-06-02 RX ADMIN — POTASSIUM CHLORIDE 40 MEQ: 750 CAPSULE, EXTENDED RELEASE ORAL at 16:09

## 2017-06-02 RX ADMIN — NICARDIPINE HYDROCHLORIDE 2.5 MG/HR: 25 INJECTION INTRAVENOUS at 08:45

## 2017-06-02 RX ADMIN — APIXABAN 5 MG: 5 TABLET, FILM COATED ORAL at 20:16

## 2017-06-02 RX ADMIN — MAGNESIUM SULFATE HEPTAHYDRATE 4 G: 40 INJECTION, SOLUTION INTRAVENOUS at 08:11

## 2017-06-02 RX ADMIN — LISINOPRIL 20 MG: 20 TABLET ORAL at 10:34

## 2017-06-02 RX ADMIN — METOPROLOL TARTRATE 5 MG: 5 INJECTION INTRAVENOUS at 16:46

## 2017-06-02 RX ADMIN — POTASSIUM CHLORIDE 40 MEQ: 750 CAPSULE, EXTENDED RELEASE ORAL at 12:02

## 2017-06-02 RX ADMIN — CARVEDILOL 6.25 MG: 6.25 TABLET, FILM COATED ORAL at 20:16

## 2017-06-02 RX ADMIN — POTASSIUM CHLORIDE 40 MEQ: 750 CAPSULE, EXTENDED RELEASE ORAL at 08:11

## 2017-06-02 RX ADMIN — CARVEDILOL 6.25 MG: 6.25 TABLET, FILM COATED ORAL at 10:35

## 2017-06-02 RX ADMIN — LAMOTRIGINE 150 MG: 100 TABLET ORAL at 20:15

## 2017-06-02 RX ADMIN — ASPIRIN 325 MG ORAL TABLET 325 MG: 325 PILL ORAL at 08:12

## 2017-06-02 RX ADMIN — AMLODIPINE BESYLATE 10 MG: 10 TABLET ORAL at 10:34

## 2017-06-02 RX ADMIN — ATORVASTATIN CALCIUM 40 MG: 40 TABLET, FILM COATED ORAL at 20:15

## 2017-06-02 NOTE — PROGRESS NOTES
Discharge Planning Assessment  [facility]     Patient Name: Pancho Bonner  MRN: [id number]  Today's Date: 6/2/2017    Admit Date: 6/1/2017          Discharge Needs Assessment       06/02/17 1142    Living Environment    Lives With spouse    Living Arrangements house   Lives c spouse c 2 level home in [place].    Number of Stairs to Enter Home 2    Number of Stairs Within Home 6    Transportation Available car;family or friend will provide    Living Environment    Provides Primary Care For no one    Quality Of Family Relationships supportive    Discharge Needs Assessment    Concerns To Be Addressed basic needs concerns;adjustment to diagnosis/illness concerns    Readmission Within The Last 30 Days no previous admission in last 30 days    Outpatient/Agency/Support Group Needs --   He used HH last year, but not currently.  He does not know name of the agency in [place].      Equipment Currently Used at Home cane, straight;walker, rolling   He has a RW and straight cane, but does not use them.             Discharge Plan       06/02/17 1145    Case Management/Social Work Plan    Plan Home    Patient/Family In Agreement With Plan yes    Additional Comments Talked to Mr. Bonner @ [facility].  He lives c his wife in [place].  He is independent c ADL's.  He was a pt. c HH last year, but does not use know name of the agency.  He has a RW and straight cane at home, but does not use them.  He is currently getting cardiac rehab in [place] at [facility]. His PCP is Dr. Shar Obregon in [place].  His Medicare /[employer] pay for his medications.   His goal is to return home c assist from his wife.  CM will continue to follow for upcoming d/c needs.          Discharge Placement     No information found                Demographic Summary       06/02/17 1139    Referral Information    Admission Type inpatient    Arrived From admitted as an inpatient    Referral Source admission list    Reason For Consult discharge planning     Contact Information    Permission Granted to Share Information With     Primary Care Physician Information    Name Dr. Shar Obregon            Functional Status       06/02/17 1140    Functional Status Current    Ambulation 2-->assistive person    Transferring 2-->assistive person    Toileting 1-->assistive equipment    Bathing 0-->independent    Dressing 0-->independent    Eating 0-->independent    Communication 0-->understands/communicates without difficulty    Swallowing (if score 2 or more for any item, consult Rehab Services) 0-->swallows foods/liquids without difficulty    Functional Status Prior    Ambulation 0-->independent    Transferring 0-->independent    Toileting 0-->independent    Bathing 0-->independent    Dressing 0-->independent    Eating 0-->independent    Communication 0-->understands/communicates without difficulty    Swallowing 0-->swallows foods/liquids without difficulty    IADL    Medications independent    Meal Preparation independent    Housekeeping assistive person    Laundry assistive person    Shopping independent    Oral Care independent            Psychosocial     None            Abuse/Neglect     None            Legal     None            Substance Abuse     None            Patient Forms     None          Halina Joel RN

## 2017-06-02 NOTE — PROGRESS NOTES
Adult Nutrition  Assessment/PES    Patient Name:  Pancho Bonner  YOB: 1940  MRN: [id number]  Admit Date:  6/1/2017    Assessment Date:  6/2/2017        Reason for Assessment       06/02/17 1047    Reason for Assessment    Reason For Assessment/Visit multidisciplinary rounds    Time Spent (min) 20              Nutrition/Diet History       06/02/17 1048    Nutrition/Diet History    Reported/Observed By RN    Other Pt doing well ,stroke symptoms much improved; sweet items at breakfast cause nausea and pt can't take meds- message sent to dietary; neuro svc okay w/ transfer to floor room.                    Nutrition Prescription Ordered       06/02/17 1050    Nutrition Prescription PO    Current PO Diet Regular    Common Modifiers Cardiac;Consistent Carbohydrate                Problem/Interventions:                    Nutrition Intervention       06/02/17 1050    Nutrition Intervention    RD/Tech Action Menu provided;Care plan reviewd;Follow Tx progress              Education/Evaluation       06/02/17 1050    Monitor/Evaluation    Monitor Per protocol        Comments:      Electronically signed by:  Moraima Pool RD  06/02/17 10:58 AM

## 2017-06-02 NOTE — THERAPY EVALUATION
Acute Care - Occupational Therapy Initial Evaluation  [facility]     Patient Name: Pancho Bonner  : 1940  MRN: [id number]  Today's Date: 2017  Onset of Illness/Injury or Date of Surgery Date: 17  Date of Referral to OT: 17  Referring Physician: MADELEINE Cabello    Admit Date: 2017       ICD-10-CM ICD-9-CM   1. Cerebrovascular accident (CVA), unspecified mechanism I63.9 434.91   2. Dysarthria R47.1 784.51   3. Elevated troponin R79.89 790.6   4. Elevated serum creatinine R79.89 790.99   5. ST elevation myocardial infarction (STEMI), unspecified artery I21.3 410.90   6. Impaired mobility and ADLs Z74.09 799.89   7. Impaired functional mobility, balance, gait, and endurance Z74.09 V49.89     Patient Active Problem List   Diagnosis   • Arnold-Chiari malformation, type I   • Complex partial epilepsy   • Apoplectic attack   • Left MCA CVA (cerebral vascular accident)   • H/O: CVA (cerebrovascular accident)   • H/O CVA (cerebral vascular accident)   • Diabetes mellitus   • Ischemic cardiomyopathy   • Hyperlipidemia   • Hypertension   • H/O Non-sustained ventricular tachycardia   • Coronary artery disease   • PAF (paroxysmal atrial fibrillation)     Past Medical History:   Diagnosis Date   • Anxiety    • Arnold-Chiari malformation, type I     followed by Dr. Martinez, but last note available was    • BPH (benign prostatic hyperplasia)    • Chronic Pleural effusion on right    • Complex partial seizure     followed by Dr. Martinez, but last note available was    • Congestive heart failure     [place] with [place]. [place] at [facility], EF 30%   • Coronary artery disease     on ASA/plavix   • CVA (cerebral vascular accident)     apical thrombus on chronic coumadin   • Diabetes mellitus    • ED (erectile dysfunction)    • GERD (gastroesophageal reflux disease)    • Hyperlipidemia    • Hypertension    • Ischemic cardiomyopathy    • Myocardial infarction    • Non-sustained ventricular tachycardia       Past Surgical History:   Procedure Laterality Date   • CARDIAC CATHETERIZATION  02/2016    SJM, 3 vessel disease, patent LIMA to LAD bypass graft    • CARDIAC CATHETERIZATION  05/25/2017    SJM, severe 3 vessel disease, patent LIMA to LAD bypass graft, recommend medical management    • CARDIAC DEFIBRILLATOR PLACEMENT  08/2009    w/ PPM Medtronic (DDD)   • COLONOSCOPY W/ BIOPSIES     • CORONARY ANGIOPLASTY WITH STENT PLACEMENT     • CORONARY ARTERY BYPASS GRAFT  2000    [facility]   • INSERT / REPLACE / REMOVE PACEMAKER  05/26/2017    Medtronic generator change, Dr. Ho, [facility]           OT ASSESSMENT FLOWSHEET (last 72 hours)      OT Evaluation       06/02/17 0804 06/02/17 0801 06/01/17 1500 06/01/17 1100       Rehab Evaluation    Document Type evaluation  -CL (P)  evaluation  -LS evaluation  -AW      Subjective Information agree to therapy;no complaints  -CL (P)  agree to therapy;no complaints  -LS no complaints  -AW      Patient Effort, Rehab Treatment good  -CL        Symptoms Noted During/After Treatment none  -CL (P)  none  -LS       General Information    Patient Profile Review yes  -CL (P)  yes  -LS       Onset of Illness/Injury or Date of Surgery Date 06/01/17  -CL (P)  06/01/17  -LS       Referring Physician MADELEINE Cabello  -CL (P)  MADELEINE Cabello  -SHIRA       Pertinent History Of Current Problem Pt presented to OSH ED 2/2 to R sided weakness and inability to speak. Pt t/f to [facility] for HLOC. CTP (+) ischemic pneumbra in L cerebral hemisphere resultingin a L MCA CVA. Pt s/p thrombectomy on 06/01/2017. Pt recently admitted to OSH d/t MI and DC'd on 05/26/17.   -CL (P)  Admitted with R-sided weakness and expressive aphasia. CT head (-); CT cerebral perf demonstrated abnormal perfusion L hemisphere. S/p emergent thrombectomy and intra-arterial TPA on 6/1. Recent hospitalization for MI and PM generator change.   -LS       Precautions/Limitations no known precautions/limitations  -CL (P)  cardiac precautions;fall precautions   -LS       Prior Level of Function independent:;all household mobility;community mobility;transfer;ADL's;dressing;bathing;driving  -CL (P)  independent:;gait;transfer;ADL's;dressing;bathing  -LS       Equipment Currently Used at Home none  -CL (P)  none  -LS  none  -CM     Plans/Goals Discussed With patient;spouse/S.O.;agreed upon  -CL (P)  patient;spouse/S.O.;agreed upon  -LS       Risks Reviewed patient:;spouse/S.O.:;LOB;nausea/vomiting;dizziness;increased discomfort;lines disloged  -CL (P)  patient:;spouse/S.O.:;other:;dizziness;increased discomfort;change in vital signs  -LS       Benefits Reviewed patient:;spouse/S.O.:;improve function;increase independence;increase strength;increase balance;decrease pain;increase knowledge  -CL (P)  patient:;spouse/S.O.:;improve function;increase independence;increase strength;increase balance;increase knowledge  -LS       Barriers to Rehab none identified  -CL (P)  medically complex  -LS       Living Environment    Lives With spouse  -CL (P)  spouse  -LS  spouse  -CM     Living Arrangements house  -CL (P)  house  -LS  house  -CM     Home Accessibility stairs within home;stairs to enter home   walk-in shower  -CL (P)  stairs to enter home;stairs within home  -LS       Number of Stairs to Enter Home 2  -CL (P)  2  -LS       Number of Stairs Within Home 6  -CL (P)  6   to level of bed/bath  -LS       Type of Financial/Environmental Concern    none  -CM     Transportation Available    car  -CM     Clinical Impression    Date of Referral to OT 06/01/17  -CL        OT Diagnosis Decreased independence in ADLs.   -CL        Patient/Family Goals Statement Return to PLOF.   -CL        Criteria for Skilled Therapeutic Interventions Met yes;treatment indicated  -CL        Rehab Potential good, to achieve stated therapy goals  -CL        Therapy Frequency daily  -CL        Anticipated Equipment Needs At Discharge --   TBA further  -CL        Anticipated Discharge Disposition home with  assist;home with outpatient services  -CL        Functional Level Prior    Ambulation    0-->independent  -CM     Transferring    0-->independent  -CM     Toileting    0-->independent  -CM     Bathing    0-->independent  -CM     Dressing    0-->independent  -CM     Eating    0-->independent  -CM     Communication    0-->understands/communicates without difficulty  -CM     Swallowing    0-->swallows foods/liquids without difficulty  -CM     Prior Functional Level Comment    none  -CM     Vital Signs    Pre Systolic BP Rehab 141  -CL (P)  141  -LS       Pre Treatment Diastolic BP 72  -CL (P)  72  -LS       Post Systolic BP Rehab 142  -CL (P)  142  -LS       Post Treatment Diastolic BP 70  -CL (P)  70  -LS       Pretreatment Heart Rate (beats/min) 61  -CL (P)  61  -LS       Posttreatment Heart Rate (beats/min) 63  -CL (P)  63  -LS       Pre SpO2 (%) 97  -CL (P)  97  -LS       O2 Delivery Pre Treatment room air  -CL (P)  room air  -LS       Post SpO2 (%) 98  -CL (P)  98  -LS       O2 Delivery Post Treatment room air  -CL (P)  room air  -LS       Pre Patient Position Sitting  -CL (P)  Sitting  -LS       Intra Patient Position Standing  -CL (P)  Standing  -LS       Post Patient Position Sitting  -CL (P)  Sitting  -LS       Pain Assessment    Pain Assessment No/denies pain  -CL (P)  0-10  -LS       Pain Score  (P)  0  -LS       Post Pain Score  (P)  0  -LS       Vision Assessment/Intervention    Visual Impairment WFL with corrective lenses  -CL        Cognitive Assessment/Intervention    Current Cognitive/Communication Assessment functional  -CL (P)  functional  -LS functional  -AW      Orientation Status oriented x 4  -CL (P)  oriented x 4  -LS oriented x 4  -AW      Follows Commands/Answers Questions 100% of the time  -CL (P)  able to follow single-step instructions;100% of the time  -% of the time  -AW      Personal Safety WNL/WFL  -CL (P)  good awareness, safety precautions  -LS       Personal Safety  Interventions fall prevention program maintained;nonskid shoes/slippers when out of bed;gait belt  -CL (P)  fall prevention program maintained;gait belt;nonskid shoes/slippers when out of bed  -LS       ROM (Range of Motion)    General ROM no range of motion deficits identified  -CL (P)  no range of motion deficits identified   BLEs  -LS       MMT (Manual Muscle Testing)    General MMT Assessment  (P)  lower extremity strength deficits identified  -LS       General MMT Assessment Detail BUE grossly 4/5.   -CL        Bed Mobility, Assessment/Treatment    Bed Mobility, Comment UIC.   -CL (P)  UIC  -LS       Transfer Assessment/Treatment    Transfers, Sit-Stand [place] contact guard assist;verbal cues required  -CL (P)  contact guard assist;verbal cues required  -LS       Transfers, Stand-Sit [place] stand by assist;verbal cues required  -CL (P)  stand by assist;verbal cues required  -LS       Transfer, Comment VCs for safety d/t lines.   -CL        Functional Mobility    Functional Mobility- Ind. Level minimum assist (75% patient effort);verbal cues required  -CL        Functional Mobility-Distance (Feet) 400  -CL        Functional Mobility- Comment Pt w/ 1 episode of minor LOB, however otherwise CGAx1+1.   -CL        Motor Skills/Interventions    Additional Documentation Balance Skills Training (Group)  -CL (P)  Balance Skills Training (Group)  -LS       Balance Skills Training    Sitting-Level of Assistance Distant supervision  -CL        Sitting-Balance Support Right upper extremity supported;Left upper extremity supported;Feet supported  -CL        Sitting-Balance Activities Forward lean;Reaching for objects;Trunk control activities  -CL        Standing-Level of Assistance Contact guard  -CL        Static Standing Balance Support No upper extremity supported  -CL        Standing-Balance Activities Weight Shift A-P;Weight Shift R-L  -CL        Gait Balance-Level of Assistance Minimum assistance  -CL         Gait Balance Support No upper extremity supported  -CL        Gait Balance Activities scanning environment R/L;side-stepping;backwards  -CL        Sensory Assessment/Intervention    Light Touch LUE;RUE  -CL        LUE Light Touch WNL  -CL        RUE Light Touch WNL  -CL        General Therapy Interventions    Planned Therapy Interventions ADL retraining;balance training;energy conservation;home exercise program;transfer training  -CL        Positioning and Restraints    Pre-Treatment Position sitting in chair/recliner  -CL        Post Treatment Position chair  -CL        In Chair notified nsg;reclined;call light within reach;encouraged to call for assist;with family/caregiver;with other staff  -CL        Lower Extremity    Lower Ext Manual Muscle Testing Detail  (P)  BLEs grossly 4/5  -LS         User Key  (r) = Recorded By, (t) = Taken By, (c) = Cosigned By    Initials Name Effective Dates    LS Darshana Gomez, PT 06/19/15 -     AW Mirlande Portillo MS CCC-SLP 06/22/15 -     CL Debbie Smallwood OT 06/08/16 -     KERRY Payan RN 06/20/16 -            Occupational Therapy Education     Title: PT OT SLP Therapies (Active)     Topic: Occupational Therapy (Active)     Point: ADL training (Active)    Description: Instruct learner(s) on proper safety adaptation and remediation techniques during self care or transfers.   Instruct in proper use of assistive devices.    Learning Progress Summary    Learner Readiness Method Response Comment Documented by Status   Patient Acceptance D,E NR Pt educated on appropriate safety precautions, appropriate t/f techniques, role of OT, and benefits of therapy. CL 06/02/17 0846 Active               Point: Precautions (Active)    Description: Instruct learner(s) on prescribed precautions during self-care and functional transfers.    Learning Progress Summary    Learner Readiness Method Response Comment Documented by Status   Patient Acceptance D,E NR Pt educated on appropriate safety  precautions, appropriate t/f techniques, role of OT, and benefits of therapy.  06/02/17 0846 Active               Point: Body mechanics (Active)    Description: Instruct learner(s) on proper positioning and spine alignment during self-care, functional mobility activities and/or exercises.    Learning Progress Summary    Learner Readiness Method Response Comment Documented by Status   Patient Acceptance D,E NR Pt educated on appropriate safety precautions, appropriate t/f techniques, role of OT, and benefits of therapy.  06/02/17 0846 Active                      User Key     Initials Effective Dates Name Provider Type Discipline     06/08/16 -  Debbie Smallwood OT Occupational Therapist OT                  OT Recommendation and Plan  Anticipated Equipment Needs At Discharge:  (TBA further)  Anticipated Discharge Disposition: home with assist, home with outpatient services  Planned Therapy Interventions: ADL retraining, balance training, energy conservation, home exercise program, transfer training  Therapy Frequency: daily  Plan of Care Review  Plan Of Care Reviewed With: patient  Progress: progress toward functional goals as expected  Outcome Summary/Follow up Plan: Pt presents w/ decreased balance and functional independence from baseline. Pt w/ 1 episode of minor LOB w/ Min Ax1 to ambulate 400 ft. Recommend pt DC home w/ assist and OP rehab.           OT Goals       06/02/17 0847          Transfer Training OT LTG    Transfer Training OT LTG, Date Established 06/02/17  -CL      Transfer Training OT LTG, Time to Achieve by discharge  -CL      Transfer Training OT LTG, Activity Type bed to chair /chair to bed;sit to stand/stand to sit;toilet  -CL      Transfer Training OT LTG, [place] Level supervision required  -CL      Transfer Training OT LTG, Additional Goal AAD  -CL      Patient Education OT LTG    Patient Education OT LTG, Date Established 06/02/17  -CL      Patient Education OT LTG, Time to Achieve by  discharge  -CL      Patient Education OT LTG, Education Type written program;HEP;positioning;posture/body mechanics;home safety;energy conservation  -CL      Patient Education OT LTG, Education Understanding verbalizes understanding  -CL      LB Dressing OT LTG    LB Dressing Goal OT LTG, Date Established 06/02/17  -CL      LB Dressing Goal OT LTG, Time to Achieve by discharge  -CL      LB Dressing Goal OT LTG, Activity Type Don socks/pants  -CL      LB Dressing Goal OT LTG, [place] Level supervision required  -CL      LB Dressing Goal OT LTG, Additional Goal AAD  -CL        User Key  (r) = Recorded By, (t) = Taken By, (c) = Cosigned By    Initials Name Provider Type    CL Debbie Smallwood OT Occupational Therapist                Outcome Measures       06/02/17 0804          How much help from another is currently needed...    Putting on and taking off regular lower body clothing? 3  -CL      Bathing (including washing, rinsing, and drying) 3  -CL      Toileting (which includes using toilet bed pan or urinal) 3  -CL      Putting on and taking off regular upper body clothing 3  -CL      Taking care of personal grooming (such as brushing teeth) 4  -CL      Eating meals 4  -CL      Score 20  -CL      Modified [place] Scale    Modified [place] Scale 3 - Moderate disability.  Requiring some help, but able to walk without assistance.  -CL      Functional Assessment    Outcome Measure Options AM-PAC 6 Clicks Daily Activity (OT);Modified [place]  -CL        User Key  (r) = Recorded By, (t) = Taken By, (c) = Cosigned By    Initials Name Provider Type    CL Debbie Smallwood OT Occupational Therapist          Time Calculation:   OT Start Time: 0804    Therapy Charges for Today     Code Description Service Date Service Provider Modifiers Qty    [id number]  OT EVAL LOW COMPLEXITY 4 6/2/2017 Debbie Smallwood OT GO 1               Debbie Smallwood OT  6/2/2017

## 2017-06-02 NOTE — PLAN OF CARE
Problem: Patient Care Overview (Adult)  Goal: Plan of Care Review  Outcome: Ongoing (interventions implemented as appropriate)    06/02/17 0847   Coping/Psychosocial Response Interventions   Plan Of Care Reviewed With patient   Patient Care Overview   Progress progress toward functional goals as expected   Outcome Evaluation   Outcome Summary/Follow up Plan Pt presents w/ decreased balance and functional independence from baseline. Pt w/ 1 episode of minor LOB w/ Min Ax1 to ambulate 400 ft. Recommend pt DC home w/ assist and OP rehab.          Problem: Inpatient Occupational Therapy  Goal: Transfer Training Goal 1 LTG- OT  Outcome: Ongoing (interventions implemented as appropriate)    06/02/17 0847   Transfer Training OT LTG   Transfer Training OT LTG, Date Established 06/02/17   Transfer Training OT LTG, Time to Achieve by discharge   Transfer Training OT LTG, Activity Type bed to chair /chair to bed;sit to stand/stand to sit;toilet   Transfer Training OT LTG, [place] Level supervision required   Transfer Training OT LTG, Additional Goal AAD       Goal: Patient Education Goal LTG- OT  Outcome: Ongoing (interventions implemented as appropriate)    06/02/17 0847   Patient Education OT LTG   Patient Education OT LTG, Date Established 06/02/17   Patient Education OT LTG, Time to Achieve by discharge   Patient Education OT LTG, Education Type written program;HEP;positioning;posture/body mechanics;home safety;energy conservation   Patient Education OT LTG, Education Understanding verbalizes understanding       Goal: LB Dressing Goal LTG- OT  Outcome: Ongoing (interventions implemented as appropriate)    06/02/17 0847   LB Dressing OT LTG   LB Dressing Goal OT LTG, Date Established 06/02/17   LB Dressing Goal OT LTG, Time to Achieve by discharge   LB Dressing Goal OT LTG, Activity Type Don socks/pants   LB Dressing Goal OT LTG, [place] Level supervision required   LB Dressing Goal OT LTG, Additional Goal AAD

## 2017-06-02 NOTE — PROGRESS NOTES
[facility]  IP Progress Note      Chief Complaint/Reason for service:  Acute CVA in a patient with an ischemic cardiomyopathy    Subjective: The patient is sitting up in a chair and says he feels well.  He's having other problems with his speech and he can move his right arm better and his hand  strength is adequate.  His wife is in the room and she told me that he's had THE PROBLEMS WITH HIS WARFARIN MANAGEMENT BEING TOO THICK AT TIMES INTO THIN    Past medical, surgical, social and family history reviewed in the patient's electronic medical record.         Vital Sign Min/Max for last 24 hours  Temp  Min: 97.8 °F (36.6 °C)  Max: 98.7 °F (37.1 °C)   BP  Min: 118/64  Max: 172/78   Pulse  Min: 59  Max: 66   Resp  Min: 16  Max: 18   SpO2  Min: 91 %  Max: 100 %   No Data Recorded      Intake/Output Summary (Last 24 hours) at 06/02/17 0824  Last data filed at 06/02/17 0600   Gross per 24 hour   Intake           2160.1 ml   Output             1000 ml   Net           1160.1 ml             Current Facility-Administered Medications:   •  acetaminophen (TYLENOL) tablet 650 mg, 650 mg, Oral, Q6H PRN, MADELEINE Hutton, 650 mg at 06/01/17 1141  •  amiodarone (PACERONE) tablet 200 mg, 200 mg, Oral, Q24H, Zak Zapata MD, 200 mg at 06/02/17 0811  •  aspirin tablet 325 mg, 325 mg, Oral, Daily, 325 mg at 06/02/17 0812 **OR** aspirin suppository 300 mg, 300 mg, Rectal, Daily, MADELEINE Ni  •  atorvastatin (LIPITOR) tablet 40 mg, 40 mg, Oral, Nightly, MADELEINE Ni, 40 mg at 06/01/17 2031  •  Magnesium Sulfate 2 gram Bolus, followed by 8 gram infusion (total Mg dose 10 grams)- Mg less than or equal to 1mg/dL, 2 g, Intravenous, PRN **OR** Magnesium Sulfate 6 gram Infusion (2 gm x 3) -Mg 1.1 -1.5 mg/dL, 2 g, Intravenous, PRN **OR** magnesium sulfate 4 gram infusion- Mg 1.6-1.9 mg/dL, 4 g, Intravenous, PRN, Curtis English, APRN, Last Rate: 25 mL/hr at 06/02/17  0811, 4 g at 06/02/17 0811  •  niCARdipine (CARDENE) 25 mg/250 mL (0.1 mg/mL) 0.9% NS VTB infusion, 5-15 mg/hr, Intravenous, Titrated, MADELEINE Ni, Last Rate: 25 mL/hr at 06/01/17 2157, 2.5 mg/hr at 06/01/17 2157  •  ondansetron (ZOFRAN) injection 4 mg, 4 mg, Intravenous, Q6H PRN, MADELEINE Ni  •  phenylephrine (JUSTICE-SYNEPHRINE) 50,000 mcg in 250 mL (200 mcg/mL) infusion, 0.5-3 mcg/kg/min, Intravenous, Titrated, MADELEINE Ni  •  potassium chloride (MICRO-K) CR capsule 40 mEq, 40 mEq, Oral, PRN, 40 mEq at 06/02/17 0811 **OR** potassium chloride (KLOR-CON) packet 40 mEq, 40 mEq, Oral, PRN **OR** potassium chloride 10 mEq in 100 mL IVPB, 10 mEq, Intravenous, Q1H PRN, MADELEINE Kumar  •  sodium chloride 0.9 % flush 1-10 mL, 1-10 mL, Intravenous, PRN, MADELEINE Ni  •  sodium chloride 0.9 % infusion, 75 mL/hr, Intravenous, Continuous, MADELEINE Ni, Last Rate: 75 mL/hr at 06/01/17 2031, 75 mL/hr at 06/01/17 2031    Physical Exam: General  well-developed well-nourished white male sitting up in a chair looks comfortable with no symptoms        HEENT: No JVP is present his tongue is midline       Respiratory:  Equal bilateral symmetrical expansion with some basilar crackles       Cardiovascular: Regular rate and rhythm without murmur gallop or click and no edema       GI: Positive bowel sounds       Lower Extremities: No edema       Neuro: Moves all 4 extremities his hand  strength is improved from yesterday on the right and he can raise his arm without difficulty       Skin:  Warm and dry no edema to palpation       Psych: Pleasant affect and oriented ×3    Results Review: We reviewed all his records just today from the [facility] and that was summarized in my note from yesterday.  His BNP was elevated yesterday.  Has not been on his cardiac drugs because of wanting to get his blood pressure higher after the acute CVA    Radiology Results:  Imaging Results (last 72  hours)     Procedure Component Value Units Date/Time    CT Head Without Contrast [[id number]]  (Abnormal) Collected:  06/01/17 0726     Updated:  06/01/17 0751    Narrative:         EXAM:    CT Head Without Intravenous Contrast    CLINICAL HISTORY:    77 years old, male; Signs and symptoms; Other: See notes; Patient HX: Prior   CT report not available; Additional info: Stroke SX    TECHNIQUE:    Axial computed tomography images of the head/brain without intravenous   contrast.  This CT exam was performed using one or more of the following dose   reduction techniques:  automated exposure control, adjustment of the mA and/or   kV according to patient size, and/or use of iterative reconstruction technique.    COMPARISON:    CT HEAD WO CONTRAST 6/22/2009 1:50:02 PM    FINDINGS:    The ventricles and sulci are mildly and diffusely prominent, compatible with   global brain volume loss.    There is moderate hypodensity of the periventricular white matter. This is   nonspecific, but a likely cause is small vessel ischemic disease.    No abnormal intra-axial or extra-axial fluid collections are identified. There   is no midline shift. No evidence of intracranial hemorrhage.    The visualized bones are unremarkable.      Impression:       1. No acute intracranial process identified.    The findings were discussed with Dr. Uribe on 6/1/2017 7:47 AM EDT.    THIS DOCUMENT HAS BEEN ELECTRONICALLY SIGNED BY CLAUDIA MARTINEZ MD    CT Cerebral Perfusion With & Without Contrast [[id number]]  (Abnormal) Collected:  06/01/17 0736     Updated:  06/01/17 0817    Narrative:         EXAM:  CT Brain Perfusion With Intravenous Contrast (0042T, [id number])    CLINICAL HISTORY:  The patient is a 77 years male; Signs and symptoms; Altered mental   status/memory loss; Additional info: StrokeExamination order is timed 6/1/2017   7:36 AM.    TECHNIQUE:  Axial computed tomography images of the brain with intravenous contrast using   cerebral perfusion  protocol.  Post-processing parametric maps were created and   reviewed.  These include cerebral blood flow, cerebral blood volume and mean   transit time.  This CT exam was performed using one or more of the following   dose reduction techniques:  automated exposure control, adjustment of the mA   and/or kV according to patient size, and/or use of iterative reconstruction   technique.  MIP reconstructed images were created and reviewed.    CONTRAST:  40 mL of isovue 370 administered intravenously.    COMPARISON:  CT HEAD WO CONTRAST 6/1/2017 7:29:13 AM    FINDINGS:  There are asymmetric perfusion abnormalities with decreased cerebral blood flow   and increased mean transit time and time to drain in the left frontal temporal   region in the middle cerebral artery distribution. There is also an area of   decreased cerebral blood flow and increased mean transit time and time to drain   in the left parieto-occipital lobe. There is intervening brain between these 2   perfusion abnormalities which does not show any perfusion defect. These areas   show preserved and essentially symmetric cerebral blood volume. On recent head   CT, there are much smaller areas of decreased attenuation and focal loss of   gray-white differentiation consistent with areas of acute infarct. This   suggests presence of significant ischemic penumbra. The Limited contrast images   of the brain for the perfusion study  show opacification in the distal left ICA   and MCA without occlusion but otherwise cannot further evaluate vascularity or   assess for segmental areas of thrombosis or stenosis.      Impression:       Significant size areas of abnormal perfusion in the left cerebral hemisphere as   described with evidence of significant ischemic penumbra.  Critical results:  THIS REPORT CONTAINS FINDINGS THAT MAY BE CRITICAL TO PATIENT CARE. The   findings were verbally communicated via telephone conference with MELANIE DE LA TORRE at 8:10 AM EDT on  6/1/2017. The findings were acknowledged and   understood.    THIS DOCUMENT HAS BEEN ELECTRONICALLY SIGNED BY ANA FREY MD    XR Chest 1 View [[id number]] Collected:  06/01/17 0842     Updated:  06/01/17 1328    Narrative:       EXAMINATION: XR CHEST 1 VW- 06/01/2017     INDICATION: Stroke Protocol (onset > 12 hrs); I63.9-Cerebral infarction,  unspecified; R47.1-Dysarthria and anarthria; R79.89-Other specified  abnormal findings of blood chemistry       COMPARISON: NONE     FINDINGS:   1. There is volume reduction at the lung bases with decreased aeration  of the bases.  2. There is mild cardiomegaly. Upper lung zones are clear.  3. ICD pacemaker defibrillator is in place from the left.           Impression:       1. Mild atelectatic volume reduction at the lung bases.  2. Otherwise no active disease.     D:  06/01/2017  E:  06/01/2017     This report was finalized on 6/1/2017 1:25 PM by Dr. Shar Pascual MD.       XR Chest 1 View [[id number]] Updated:  06/02/17 0305          EKG: Paced atrial rhythm    ECHO:     Tele:  Sinus rhythm    Assessment/Plan: 1This patient had an acute embolic CVA and has done well after thrombectomy.  He will need to resume his anticoagulation with a goal INR of 2-2.7.  Eliquis be an excellent choice in excellent choice  2 ischemic cardiomyopathy-Entresto would be good but pt has financial concerns.  So we'll leave him on his lisinopril  3 would like to get the discharge planner to evaluate the cost of Eliquis since this patient has had so much trouble with his INR going up and down  4 check BNP since the patient has crackles today    Zak Zapata MD  06/02/17  8:24 AM

## 2017-06-02 NOTE — PROGRESS NOTES
"   LOS: 1 day   Patient Care Team:  Shar Obregon MD as PCP - General    Chief Complaint: Left MCA stroke status post thrombectomy    Subjective     Stroke   The current episode started yesterday. The problem has been resolved.       Subjective:  Symptoms:  Resolved.    Diet:  Adequate intake.    Activity level: Normal.    Pain:  He reports no pain.        History taken from: patient chart family RN    Objective     Vital Signs  Temp:  [97.8 °F (36.6 °C)-98.7 °F (37.1 °C)] 98.6 °F (37 °C)  Heart Rate:  [59-66] 63  Resp:  [16-18] 18  BP: (118-154)/() 142/75    Objective:  General Appearance:  Comfortable, well-appearing and in no acute distress.    Vital signs: (most recent): Blood pressure 142/75, pulse 63, temperature 98.6 °F (37 °C), temperature source Oral, resp. rate 18, height 74\" (188 cm), weight 163 lb (73.9 kg), SpO2 97 %.  Vital signs are normal.    Neurological: Patient is alert and oriented to person, place and time.  Normal strength.    Pupils:  Pupils are equal, round, and reactive to light.              Results Review:     I reviewed the patient's new clinical results.      Assessment/Plan     Principal Problem:    Ischemic cardiomyopathy  Active Problems:    Left MCA CVA (cerebral vascular accident)    H/O CVA (cerebral vascular accident)    Diabetes mellitus    Hyperlipidemia    Hypertension    H/O Non-sustained ventricular tachycardia    Coronary artery disease    PAF (paroxysmal atrial fibrillation)      Assessment:    Condition: In stable condition.  Improving.   (Patient is much improved as compared to his admission.  Patient is following all my commands and is equal strength in all extremities.  Patient is been up ambulating taking food by mouth and voiding appropriately.    Patient has no central motor drift.  No facial droop noted.  Patient has no difficulty with rapid alternating movements.  Patient is pleased postsurgical outcome.  Family is understanding.    Patient's CT shows no " hemorrhage and no progression of stroke.  With no hemorrhage.  Patient has an extensive cardiac history with CABG and stenting.  Dr. Zapata saw him yesterday.  With a stable CT scan.  I would okay.  Cardiology to anticoagulate the patient. NOACs would be preferential. ).       Michael Villafana PA-C  06/02/17  9:52 AM    Time: 30 minutes

## 2017-06-02 NOTE — PROGRESS NOTES
Intensivist Note     6/2/2017  Hospital Day: 1      Mr. Pancho Bonner, 77 y.o. male is followed for:  Principal Problem:    Acute embolic Left MCA CVA at admission. INR subtherapeutic at 1.1. 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 On chronic Coumadin at admission, but subtherapeutic    History of Ischemic cardiomyopathy. EF 30% in past, EF 50% on echo 6/1/17    Coronary artery disease with CABG in 2000, and 2016. Stent placed February 2017    H/O NSVT status post PPM/ICD    History of PAF    Diabetes mellitus    Hyperlipidemia    Hypertension       SUBJECTIVE     Subjective    Mr. Bonner is a 78 y/o WM who was last known well 5 /31/17 at bedtime.  He woke up 6/1/17 with right sided weakness and was not able to speak. EMS was called and he was brought to [facility]. NIH was 10. He had a room air sat 97%, temp 97.7, P 60, RR 18, 151/71. CT Head had no acute findings. CT cerebral perfusion scan had significant size areas of abnormal perfusion in the left cerebral hemisphere with evidence of significant ischemic penumbra. He had a WBC 6.5, Hgb 12, HCT 37.9, plts 235, Troponin 1.04, glucose 115, BUN 16, Cr 1.4, K 3.3, PT 13.5 and INR 1.1. He was taken to the cath lab emergently and had a thrombectomy and intra-arterial TPA. By the time I saw him yesterday after the procedure, his speech and right-sided weakness had already improved according to the patient and family.     He has an extensive cardiac history and has been followed by Dr. Krish Pompa almost exclusively at [facility]. This includes coronary artery disease with CABG as well as redo CABG. Apparently has an ischemic cardiomyopathy with a history of NSVT and underwent permanent pacemaker/AICD placement 8/2009. He also has a previous history of CVA felt due to apical thrombus and has been on chronic Coumadin for years. The patient was just hospitalized at [facility] one week prior to  "admission here and was just discharged 5/26/17. Apparently he was having recurrent episodes of VT leading to ICD shocks. (This had also occurred in February). On the episode in February catheterization revealed occlusion of the vein graft which was stented. On his recent hospitalization he again had slow V. tach and was shocked. He underwent a repeat heart cath on 5/25/17 which was supposedly unchanged since the previous heart cath February 2016. It revealed severe three-vessel disease with a patent LIMA to LAD bypass graft and only medical management was recommended. He was taken off of his Coumadin during his hospitalization last week because his PPM required a generator change. It was supposedly resumed at discharge but upon arrival to [facility] yesterday with his CVA, his INR was only 1.1.    The patient appears to be doing quite well today. He is ambulating without difficulty is nearly back to normal. He follows all commands and his strength is equal. Speech has seen him and he is having no problems with dysphagia and has been advanced to a standard diet. Follow-up CT scan shows no progression of the CVA and no bleed. He has already seen neurosurgery and been moved to the floor. In addition Dr. Rodrigez feels that his anticoagulation can be resumed and that in view of his previous problems with Coumadin, a NOAC would be the drug of choice. Discharge planner is apparently evaluating whether or not the patient can afford Eliquis. At the present time however I cannot see that any anticoagulation has been resumed.     The patient's relevant past medical, surgical and social history were reviewed and updated in Epic as appropriate.    OBJECTIVE     /76 (BP Location: Right arm, Patient Position: Lying)  Pulse 65  Temp 98.4 °F (36.9 °C) (Oral)   Resp 16  Ht 74\" (188 cm)  Wt 163 lb (73.9 kg)  SpO2 97%  BMI 20.93 kg/m2          Flowsheet Rows         First Filed Value    Admission Height  74\" (188 cm) " Documented at 06/01/2017 0732    Admission Weight  163 lb (73.9 kg) Documented at 06/01/2017 0732        Intake & Output (last day)       06/01 0701 - 06/02 0700 06/02 0701 - 06/03 0700    P.O.  480    I.V. (mL/kg) 2160.1 (29.2) 713 (9.6)    Total Intake(mL/kg) 2160.1 (29.2) 1193 (16.1)    Urine (mL/kg/hr) 1000     Total Output 1000      Net +1160.1 +1193          Unmeasured Urine Occurrence  1 x          Objective    Exam:  General Exam:  Tylenol elderly white male in no acute distress  HEENT: Pupils equal and reactive. Nose and throat clear.  Neck:                          Supple, no JVD, thyromegaly, or adenopathy  Lungs: Clear anteriorly and posteriorly without any rales  Cardiovascular: Regular rate and rhythm without murmurs or gallops.  Abdomen: Soft nontender without organomegaly or masses.   and rectal: Deferred.  Extremities: No cyanosis clubbing edema.  Neurologic:                 Symmetric strength. No focal deficits. Only very mild expressive aphasia    Chest X-Ray: Significant cardiomegaly. Sternal wires. Dual-chamber permanent pacemaker/ICD with generator over the left chest. Some slight increased markings at the right base and small effusion but otherwise fairly clear.    INFUSIONS    niCARdipine 5-15 mg/hr Last Rate: Stopped (06/02/17 1030)   phenylephrine (JUSTICE-SYNEPHRINE) 50 mg/250 (0.2 mg/mL) infusion 0.5-3 mcg/kg/min    sodium chloride 75 mL/hr Last Rate: 75 mL/hr (06/01/17 2031)         Results from last 7 days  Lab Units 06/02/17  0449 06/01/17  0749 06/01/17  0735   WBC 10*3/mm3 6.42 6.54  --    HEMOGLOBIN g/dL 10.4* 12.0*  --    HEMOGLOBIN, POC g/dL  --   --  12.9   HEMATOCRIT % 34.0* 37.9*  --    HEMATOCRIT POC %  --   --  38   PLATELETS 10*3/mm3 217 235  --        Results from last 7 days  Lab Units 06/02/17 0449 06/01/17  0735   SODIUM mmol/L 142  --    POTASSIUM mmol/L 3.0*  --    CHLORIDE mmol/L 107  --    TOTAL CO2 mmol/L 28.0  --    BUN mg/dL 11  --    CREATININE mg/dL 1.00 1.40*    GLUCOSE mg/dL 95  --    CALCIUM mg/dL 8.7  --        Results from last 7 days  Lab Units 06/02/17  0449   MAGNESIUM mg/dL 1.9   PHOSPHORUS mg/dL 2.5       No results found for: SEDRATE  Lab Results   Component Value Date    .0 (H) 06/02/2017     Lab Results   Component Value Date    CKTOTAL 36 04/18/2016    CKMB 0.8 04/18/2016    CKMBINDEX  04/18/2016      Comment:      The relative index is statistically unreliable  if the CK is < or equal to 40 U/L.      TROPONINI 0.020 02/09/2017     No results found for: TSH  No results found for: LACTATE  No results found for: CORTISOL          [facility] Ventilation:   Ventilator/Non-Invasive Ventilation Settings     None          I reviewed the patient's results, images and medication.    Assessment/Plan   ASSESSMENT      Principal Problem:    Acute embolic Left MCA CVA at admission. INR subtherapeutic at 1.1. 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 On chronic Coumadin at admission, but subtherapeutic    History of Ischemic cardiomyopathy. EF 30% in past, EF 50% on echo 6/1/17    Coronary artery disease with CABG in 2000, and 2016. Stent placed February 2017    H/O NSVT status post PPM/ICD    History of PAF    Diabetes mellitus    Hyperlipidemia    Hypertension      DISCUSSION: Had an excellent response to endovascular intervention with thrombectomy 6/1/17. Both cardiology and neurosurgery agreed that he needs to be back on his anticoagulant therapy and neurosurgery has cleared him to start any time. Has already been moved to the floor but since no one has started his anticoagulant therapy will start him on Eliquis tonight.    PLAN     1. Has already been moved to the floor  2. Wife states that they want to continue their cardiac follow-up with [facility] as they like coming to [facility] and Dr. Pompa their former cardiologist has retired. Will leave this up to Dr. Duran  3. I will start Eliquis tonight and have case  manager follow-up on financial help  4. I will resume some of his home medications and will ask the hospitalist to follow up with that on the floor  5. He was on aspirin and Plavix at home and is only on aspirin now. Since I am starting Eliquis will not resume Plavix, but defer to Dr. Duran  6. Call if we can be of any help  7. Defer to neurosurgery whether he needs rehabilitation facility or this can be done at home      Plan of care and goals reviewed with mulitdisciplinary team at daily rounds.    I discussed the patient's findings and my recommendations with patient, family and nursing staff    Time spent Critical care 35 min (It does not include procedure time).    Krish Hamm MD  Intensive Care Medicine  06/02/17 6:08 PM

## 2017-06-02 NOTE — CONSULTS
Patient does not meet diabetes education order criteria, therefore patient was not seen for diabetes education at this time.  Please re consult as needed.

## 2017-06-02 NOTE — PLAN OF CARE
Problem: Patient Care Overview (Adult)  Goal: Plan of Care Review  Outcome: Ongoing (interventions implemented as appropriate)    06/02/17 0838   Coping/Psychosocial Response Interventions   Plan Of Care Reviewed With patient;spouse   Outcome Evaluation   Outcome Summary/Follow up Plan Pt demonstrates decreased indep and balance deficits re: functional mobility, warranting short episode of PT to promote PLOF and safe d/c. Recommend d/c home with assist and return to OP cardiac rehab (after MD clearance).          Problem: Stroke (Ischemic) (Adult)  Goal: Signs and Symptoms of Listed Potential Problems Will be Absent or Manageable (Stroke)  Outcome: Ongoing (interventions implemented as appropriate)    06/02/17 0838   Stroke (Ischemic)   Problems Assessed (Stroke (Ischemic)/TIA) motor/sensory impairment   Problems Present (Stroke (Ischemic)/TIA) motor/sensory impairment         Problem: Inpatient Physical Therapy  Goal: Bed Mobility Goal LTG- PT  Outcome: Ongoing (interventions implemented as appropriate)    06/02/17 0838   Bed Mobility PT LTG   Bed Mobility PT LTG, Date Established 06/02/17   Bed Mobility PT LTG, Time to Achieve 2 wks   Bed Mobility PT LTG, Activity Type supine to sit/sit to supine   Bed Mobility PT LTG, [place] Level independent       Goal: Transfer Training Goal 1 LTG- PT  Outcome: Ongoing (interventions implemented as appropriate)    06/02/17 0838   Transfer Training PT LTG   Transfer Training PT LTG, Date Established 06/02/17   Transfer Training PT LTG, Time to Achieve 2 wks   Transfer Training PT LTG, Activity Type sit to stand/stand to sit   Transfer Training PT LTG, [place] Level independent       Goal: Gait Training Goal LTG- PT  Outcome: Ongoing (interventions implemented as appropriate)    06/02/17 0838   Gait Training PT LTG   Gait Training Goal PT LTG, Date Established 06/02/17   Gait Training Goal PT LTG, Time to Achieve 2 wks   Gait Training Goal PT LTG, [place] Level  supervision required   Gait Training Goal PT LTG, Distance to Achieve 600       Goal: Stair Training Goal LTG- PT  Outcome: Ongoing (interventions implemented as appropriate)    06/02/17 0838   Stair Training PT LTG   Stair Training Goal PT LTG, Date Established 06/02/17   Stair Training Goal PT LTG, Time to Achieve 2 wks   Stair Training Goal PT LTG, Number of Steps 8   Stair Training Goal PT LTG, [place] Level supervision required   Stair Training Goal PT LTG, Assist Device 1 handrail

## 2017-06-02 NOTE — THERAPY EVALUATION
Acute Care - Physical Therapy Initial Evaluation  [facility]     Patient Name: Pancho Bonner  : 1940  MRN: [id number]  Today's Date: 2017   Onset of Illness/Injury or Date of Surgery Date: 17  Date of Referral to PT: 17  Referring Physician: MADELEINE Cabello      Admit Date: 2017     Visit Dx:    ICD-10-CM ICD-9-CM   1. Cerebrovascular accident (CVA), unspecified mechanism I63.9 434.91   2. Dysarthria R47.1 784.51   3. Elevated troponin R79.89 790.6   4. Elevated serum creatinine R79.89 790.99   5. ST elevation myocardial infarction (STEMI), unspecified artery I21.3 410.90   6. Impaired mobility and ADLs Z74.09 799.89   7. Impaired functional mobility, balance, gait, and endurance Z74.09 V49.89     Patient Active Problem List   Diagnosis   • Arnold-Chiari malformation, type I   • Complex partial epilepsy   • Apoplectic attack   • Left MCA CVA (cerebral vascular accident)   • H/O: CVA (cerebrovascular accident)   • H/O CVA (cerebral vascular accident)   • Diabetes mellitus   • Ischemic cardiomyopathy   • Hyperlipidemia   • Hypertension   • H/O Non-sustained ventricular tachycardia   • Coronary artery disease   • PAF (paroxysmal atrial fibrillation)     Past Medical History:   Diagnosis Date   • Anxiety    • Arnold-Chiari malformation, type I     followed by Dr. Martinez, but last note available was    • BPH (benign prostatic hyperplasia)    • Chronic Pleural effusion on right    • Complex partial seizure     followed by Dr. Martinez, but last note available was    • Congestive heart failure     follows with Dr. Pompa at [facility], EF 30%   • Coronary artery disease     on ASA/plavix   • CVA (cerebral vascular accident)     apical thrombus on chronic coumadin   • Diabetes mellitus    • ED (erectile dysfunction)    • GERD (gastroesophageal reflux disease)    • Hyperlipidemia    • Hypertension    • Ischemic cardiomyopathy    • Myocardial infarction    • Non-sustained ventricular  tachycardia      Past Surgical History:   Procedure Laterality Date   • CARDIAC CATHETERIZATION  02/2016    SJM, 3 vessel disease, patent LIMA to LAD bypass graft    • CARDIAC CATHETERIZATION  05/25/2017    SJM, severe 3 vessel disease, patent LIMA to LAD bypass graft, recommend medical management    • CARDIAC DEFIBRILLATOR PLACEMENT  08/2009    w/ PPM Medtronic (DDD)   • COLONOSCOPY W/ BIOPSIES     • CORONARY ANGIOPLASTY WITH STENT PLACEMENT     • CORONARY ARTERY BYPASS GRAFT  2000    [facility]   • INSERT / REPLACE / REMOVE PACEMAKER  05/26/2017    Medtronic generator change, Dr. oH, [facility]           PT ASSESSMENT (last 72 hours)      PT Evaluation       06/02/17 0804 06/02/17 0801    Rehab Evaluation    Document Type evaluation  -CL evaluation  -LS    Subjective Information agree to therapy;no complaints  -CL agree to therapy;no complaints  -LS    Patient Effort, Rehab Treatment good  -CL     Symptoms Noted During/After Treatment none  -CL none  -LS    General Information    Patient Profile Review yes  -CL yes  -LS    Onset of Illness/Injury or Date of Surgery Date 06/01/17  -CL 06/01/17  -LS    Referring Physician MADELEINE Cabello  -MADELEINE Mueller  -SHIRA    Pertinent History Of Current Problem Pt presented to OSH ED 2/2 to R sided weakness and inability to speak. Pt t/f to [facility] for HLOC. CTP (+) ischemic pneumbra in L cerebral hemisphere resultingin a L MCA CVA. Pt s/p thrombectomy on 06/01/2017. Pt recently admitted to OSH d/t MI and DC'd on 05/26/17.   -CL Admitted with R-sided weakness and expressive aphasia. CT head (-); CT cerebral perf demonstrated abnormal perfusion L hemisphere. S/p emergent thrombectomy and intra-arterial TPA on 6/1. Recent hospitalization for MI and PM generator change.   -LS    Precautions/Limitations no known precautions/limitations  -CL cardiac precautions;fall precautions  -LS    Prior Level of Function independent:;all household mobility;community  mobility;transfer;ADL's;dressing;bathing;driving  -CL independent:;gait;transfer;ADL's;dressing;bathing  -LS    Equipment Currently Used at Home none  -CL none  -LS    Plans/Goals Discussed With patient;spouse/S.O.;agreed upon  -CL patient;spouse/S.O.;agreed upon  -LS    Risks Reviewed patient:;spouse/S.O.:;LOB;nausea/vomiting;dizziness;increased discomfort;lines disloged  -CL patient:;spouse/S.O.:;other:;dizziness;increased discomfort;change in vital signs  -LS    Benefits Reviewed patient:;spouse/S.O.:;improve function;increase independence;increase strength;increase balance;decrease pain;increase knowledge  -CL patient:;spouse/S.O.:;improve function;increase independence;increase strength;increase balance;increase knowledge  -LS    Barriers to Rehab none identified  -CL medically complex  -LS    Living Environment    Lives With spouse  -CL spouse  -LS    Living Arrangements house  -CL house  -LS    Home Accessibility stairs within home;stairs to enter home   walk-in shower  -CL stairs to enter home;stairs within home  -LS    Number of Stairs to Enter Home 2  -CL 2  -LS    Number of Stairs Within Home 6  -CL 6   to level of bed/bath  -LS    Clinical Impression    Date of Referral to PT  06/01/17  -LS    PT Diagnosis  impaired functional mobility, balance, gait  -LS    Patient/Family Goals Statement  return to PLOF; go home  -LS    Criteria for Skilled Therapeutic Interventions Met  yes;treatment indicated  -LS    Rehab Potential  good, to achieve stated therapy goals  -LS    Vital Signs    Pre Systolic BP Rehab 141  -  -LS    Pre Treatment Diastolic BP 72  -CL 72  -LS    Post Systolic BP Rehab 142  -  -LS    Post Treatment Diastolic BP 70  -CL 70  -LS    Pretreatment Heart Rate (beats/min) 61  -CL 61  -LS    Posttreatment Heart Rate (beats/min) 63  -CL 63  -LS    Pre SpO2 (%) 97  -CL 97  -LS    O2 Delivery Pre Treatment room air  -CL room air  -LS    Post SpO2 (%) 98  -CL 98  -LS    O2 Delivery Post  Treatment room air  -CL room air  -LS    Pre Patient Position Sitting  -CL Sitting  -LS    Intra Patient Position Standing  -CL Standing  -LS    Post Patient Position Sitting  -CL Sitting  -LS    Pain Assessment    Pain Assessment No/denies pain  -CL 0-10  -LS    Pain Score  0  -LS    Post Pain Score  0  -LS    Vision Assessment/Intervention    Visual Impairment WFL with corrective lenses  -CL     Cognitive Assessment/Intervention    Current Cognitive/Communication Assessment functional  -CL functional  -LS    Orientation Status oriented x 4  -CL oriented x 4  -LS    Follows Commands/Answers Questions 100% of the time  -CL able to follow single-step instructions;100% of the time  -LS    Personal Safety WNL/WFL  -CL good awareness, safety precautions  -LS    Personal Safety Interventions fall prevention program maintained;nonskid shoes/slippers when out of bed;gait belt  -CL fall prevention program maintained;gait belt;nonskid shoes/slippers when out of bed  -LS    ROM (Range of Motion)    General ROM no range of motion deficits identified  -CL no range of motion deficits identified   BLEs  -LS    MMT (Manual Muscle Testing)    General MMT Assessment  lower extremity strength deficits identified  -LS    General MMT Assessment Detail BUE grossly 4/5.   -CL     Lower Extremity    Lower Ext Manual Muscle Testing Detail  BLEs grossly 4/5  -LS    Bed Mobility, Assessment/Treatment    Bed Mobility, Comment UIC.   -CL UIC  -LS    Transfer Assessment/Treatment    Transfers, Sit-Stand [place] contact guard assist;verbal cues required  -CL contact guard assist;verbal cues required  -LS    Transfers, Stand-Sit [place] stand by assist;verbal cues required  -CL stand by assist;verbal cues required  -LS    Transfer, Comment VCs for safety d/t lines.   -CL     Gait Assessment/Treatment    Gait, [place] Level  minimum assist (75% patient effort)  -LS    Gait, Distance (Feet)  400  -LS    Gait, Gait Deviations  justin  decreased;forward flexed posture;step length decreased   decreased UE swing; guarded quality of gait  -LS    Gait, Comment  Pt primarily CGA; demonstrated 1 slight LOB during turn, requiring min assist to correct. VC's for posture and increasing step length.   -LS    Motor Skills/Interventions    Additional Documentation Balance Skills Training (Group)  -CL Balance Skills Training (Group);Gross Motor Coordination Training (Group)  -LS    Balance Skills Training    Sitting-Level of Assistance Distant supervision  -CL Distant supervision  -LS    Sitting-Balance Support Right upper extremity supported;Left upper extremity supported;Feet supported  -CL Feet supported  -LS    Sitting-Balance Activities Forward lean;Reaching for objects;Trunk control activities  -CL     Standing-Level of Assistance Contact guard  -CL Contact guard  -LS    Static Standing Balance Support No upper extremity supported  -CL No upper extremity supported  -LS    Standing-Balance Activities Weight Shift A-P;Weight Shift R-L  -CL     Gait Balance-Level of Assistance Minimum assistance  -CL Minimum assistance  -LS    Gait Balance Support No upper extremity supported  -CL No upper extremity supported  -LS    Gait Balance Activities scanning environment R/L;side-stepping;backwards  -CL scanning environment R/L  -LS    Therapy Exercises    Bilateral Lower Extremities  AROM:;10 reps;sitting;ankle pumps/circles  -LS    Gross Motor Coordination Training    Gross Motor Skill, Impairments Detail  Heel shin slides grossly WNL   -LS    Sensory Assessment/Intervention    Light Touch LUE;RUE  -CL RLE;LLE  -LS    LUE Light Touch WNL  -CL     RUE Light Touch WNL  -CL     LLE Light Touch  WNL  -LS    RLE Light Touch  WNL  -LS    Positioning and Restraints    Pre-Treatment Position sitting in chair/recliner  -CL sitting in chair/recliner  -LS    Post Treatment Position chair  -CL chair  -LS    In Chair notified nsg;reclined;call light within reach;encouraged to  call for assist;with family/caregiver;with other staff  -CL notified nsg;reclined;call light within reach;encouraged to call for assist;with family/caregiver;legs elevated   MD present  -      06/01/17 1500 06/01/17 1100    Rehab Evaluation    Document Type evaluation  -AW     Subjective Information no complaints  -AW     General Information    Equipment Currently Used at Home  none  -CM    Living Environment    Lives With  spouse  -CM    Living Arrangements  house  -CM    Type of Financial/Environmental Concern  none  -CM    Transportation Available  car  -CM    Cognitive Assessment/Intervention    Current Cognitive/Communication Assessment functional  -AW     Orientation Status oriented x 4  -AW     Follows Commands/Answers Questions 100% of the time  -AW       User Key  (r) = Recorded By, (t) = Taken By, (c) = Cosigned By    Initials Name Provider Type    SHIRA Gomez, PT Physical Therapist    AW Mirlande Portillo, MS CCC-SLP Speech and Language Pathologist    CL Debbie Smallwood, OT Occupational Therapist    KERRY Payan, RN Registered Nurse          Physical Therapy Education     Title: PT OT SLP Therapies (Active)     Topic: Physical Therapy (Active)     Point: Mobility training (Active)    Learning Progress Summary    Learner Readiness Method Response Comment Documented by Status   Patient Acceptance D,E NR   06/02/17 0853 Active   Significant Other Acceptance D,E NR   06/02/17 0853 Active               Point: Body mechanics (Active)    Learning Progress Summary    Learner Readiness Method Response Comment Documented by Status   Patient Acceptance D,E NR   06/02/17 0853 Active   Significant Other Acceptance D,E NR   06/02/17 0853 Active               Point: Precautions (Active)    Learning Progress Summary    Learner Readiness Method Response Comment Documented by Status   Patient Acceptance D,E NR   06/02/17 0853 Active   Significant Other Acceptance D,E NR   06/02/17 0853 Active                       User Key     Initials Effective Dates Name Provider Type Discipline    LS 06/19/15 -  Darshana Gomez, PT Physical Therapist PT                PT Recommendation and Plan  Anticipated Discharge Disposition: home with assist, home with outpatient services (return to OP cardiac rehab (after MD clearance) )  Plan of Care Review  Plan Of Care Reviewed With: patient, spouse  Outcome Summary/Follow up Plan: Pt demonstrates decreased indep and balance deficits re: functional mobility, warranting short episode of  PT to promote PLOF and safe d/c. Recommend d/c home with assist and return to OP cardiac rehab (after MD clearance).           IP PT Goals       06/02/17 0838          Bed Mobility PT LTG    Bed Mobility PT LTG, Date Established 06/02/17  -LS      Bed Mobility PT LTG, Time to Achieve 2 wks  -LS      Bed Mobility PT LTG, Activity Type supine to sit/sit to supine  -LS      Bed Mobility PT LTG, [place] Level independent  -LS      Transfer Training PT LTG    Transfer Training PT LTG, Date Established 06/02/17  -LS      Transfer Training PT LTG, Time to Achieve 2 wks  -LS      Transfer Training PT LTG, Activity Type sit to stand/stand to sit  -LS      Transfer Training PT LTG, [place] Level independent  -LS      Gait Training PT LTG    Gait Training Goal PT LTG, Date Established 06/02/17  -LS      Gait Training Goal PT LTG, Time to Achieve 2 wks  -LS      Gait Training Goal PT LTG, [place] Level supervision required  -LS      Gait Training Goal PT LTG, Distance to Achieve 600  -LS      Stair Training PT LTG    Stair Training Goal PT LTG, Date Established 06/02/17  -LS      Stair Training Goal PT LTG, Time to Achieve 2 wks  -LS      Stair Training Goal PT LTG, Number of Steps 8  -LS      Stair Training Goal PT LTG, [place] Level supervision required  -LS      Stair Training Goal PT LTG, Assist Device 1 handrail  -LS        User Key  (r) = Recorded By, (t) = Taken By, (c) = Cosigned By     Initials Name Provider Type    LS Darshana Gomez, PT Physical Therapist                Outcome Measures       06/02/17 0804 06/02/17 0801       How much help from another person do you currently need...    Turning from your back to your side while in flat bed without using bedrails?  3  -LS     Moving from lying on back to sitting on the side of a flat bed without bedrails?  3  -LS     Moving to and from a bed to a chair (including a wheelchair)?  3  -LS     Standing up from a chair using your arms (e.g., wheelchair, bedside chair)?  3  -LS     Climbing 3-5 steps with a railing?  3  -LS     To walk in hospital room?  3  -LS     AM-PAC 6 Clicks Score  18  -LS     How much help from another is currently needed...    Putting on and taking off regular lower body clothing? 3  -CL      Bathing (including washing, rinsing, and drying) 3  -CL      Toileting (which includes using toilet bed pan or urinal) 3  -CL      Putting on and taking off regular upper body clothing 3  -CL      Taking care of personal grooming (such as brushing teeth) 4  -CL      Eating meals 4  -CL      Score 20  -CL      Modified Lauren Scale    Modified [place] Scale 3 - Moderate disability.  Requiring some help, but able to walk without assistance.  -CL 3 - Moderate disability.  Requiring some help, but able to walk without assistance.  -LS     Functional Assessment    Outcome Measure Options AM-PAC 6 Clicks Daily Activity (OT);Modified Lauren  -CL AM-PAC 6 Clicks Basic Mobility (PT)  -       User Key  (r) = Recorded By, (t) = Taken By, (c) = Cosigned By    Initials Name Provider Type    LS Darshana Gomez, PT Physical Therapist    CL Debbie Smallwood OT Occupational Therapist           Time Calculation:         PT Charges       06/02/17 0854          Time Calculation    Start Time 0801  -      PT Received On 06/02/17  -      PT Goal Re-Cert Due Date 06/12/17  -      Time Calculation- PT    Total Timed Code Minutes- PT 8 minute(s)  -LS        User  Key  (r) = Recorded By, (t) = Taken By, (c) = Cosigned By    Initials Name Provider Type    LS Darshana Gomez, PT Physical Therapist          Therapy Charges for Today     Code Description Service Date Service Provider Modifiers Qty    62196565821 HC PT EVAL MOD COMPLEXITY 4 6/2/2017 Darshana Gomez, PT GP 1    14369509801 HC PT THER PROC EA 15 MIN 6/2/2017 Darshana Gomez, PT GP 1          PT G-Codes  Outcome Measure Options: AM-PAC 6 Clicks Daily Activity (OT), Modified Lauren Gomez, PT  6/2/2017

## 2017-06-03 VITALS
HEIGHT: 74 IN | DIASTOLIC BLOOD PRESSURE: 78 MMHG | RESPIRATION RATE: 16 BRPM | WEIGHT: 163 LBS | OXYGEN SATURATION: 96 % | SYSTOLIC BLOOD PRESSURE: 143 MMHG | BODY MASS INDEX: 20.92 KG/M2 | HEART RATE: 59 BPM | TEMPERATURE: 99 F

## 2017-06-03 LAB
ALBUMIN SERPL-MCNC: 3.9 G/DL (ref 3.2–4.8)
ANION GAP SERPL CALCULATED.3IONS-SCNC: 4 MMOL/L (ref 3–11)
BASOPHILS # BLD AUTO: 0.02 10*3/MM3 (ref 0–0.2)
BASOPHILS NFR BLD AUTO: 0.3 % (ref 0–1)
BUN BLD-MCNC: 9 MG/DL (ref 9–23)
BUN/CREAT SERPL: 10 (ref 7–25)
CALCIUM SPEC-SCNC: 8.9 MG/DL (ref 8.7–10.4)
CHLORIDE SERPL-SCNC: 107 MMOL/L (ref 99–109)
CO2 SERPL-SCNC: 30 MMOL/L (ref 20–31)
CREAT BLD-MCNC: 0.9 MG/DL (ref 0.6–1.3)
DEPRECATED RDW RBC AUTO: 48.5 FL (ref 37–54)
EOSINOPHIL # BLD AUTO: 0.28 10*3/MM3 (ref 0.1–0.3)
EOSINOPHIL NFR BLD AUTO: 4.9 % (ref 0–3)
ERYTHROCYTE [DISTWIDTH] IN BLOOD BY AUTOMATED COUNT: 16 % (ref 11.3–14.5)
GFR SERPL CREATININE-BSD FRML MDRD: 82 ML/MIN/1.73
GLUCOSE BLD-MCNC: 99 MG/DL (ref 70–100)
HCT VFR BLD AUTO: 36.2 % (ref 38.9–50.9)
HGB BLD-MCNC: 10.8 G/DL (ref 13.1–17.5)
IMM GRANULOCYTES # BLD: 0.03 10*3/MM3 (ref 0–0.03)
IMM GRANULOCYTES NFR BLD: 0.5 % (ref 0–0.6)
LYMPHOCYTES # BLD AUTO: 0.93 10*3/MM3 (ref 0.6–4.8)
LYMPHOCYTES NFR BLD AUTO: 16.2 % (ref 24–44)
MCH RBC QN AUTO: 24.8 PG (ref 27–31)
MCHC RBC AUTO-ENTMCNC: 29.8 G/DL (ref 32–36)
MCV RBC AUTO: 83.2 FL (ref 80–99)
MONOCYTES # BLD AUTO: 0.64 10*3/MM3 (ref 0–1)
MONOCYTES NFR BLD AUTO: 11.2 % (ref 0–12)
NEUTROPHILS # BLD AUTO: 3.83 10*3/MM3 (ref 1.5–8.3)
NEUTROPHILS NFR BLD AUTO: 66.9 % (ref 41–71)
PHOSPHATE SERPL-MCNC: 2.4 MG/DL (ref 2.4–5.1)
PLAT MORPH BLD: NORMAL
PLATELET # BLD AUTO: 200 10*3/MM3 (ref 150–450)
PMV BLD AUTO: 10.2 FL (ref 6–12)
POTASSIUM BLD-SCNC: 3.7 MMOL/L (ref 3.5–5.5)
RBC # BLD AUTO: 4.35 10*6/MM3 (ref 4.2–5.76)
RBC MORPH BLD: NORMAL
SODIUM BLD-SCNC: 141 MMOL/L (ref 132–146)
WBC MORPH BLD: NORMAL
WBC NRBC COR # BLD: 5.73 10*3/MM3 (ref 3.5–10.8)

## 2017-06-03 PROCEDURE — 99238 HOSP IP/OBS DSCHRG MGMT 30/<: CPT | Performed by: NURSE PRACTITIONER

## 2017-06-03 PROCEDURE — 85007 BL SMEAR W/DIFF WBC COUNT: CPT | Performed by: INTERNAL MEDICINE

## 2017-06-03 PROCEDURE — 85025 COMPLETE CBC W/AUTO DIFF WBC: CPT | Performed by: INTERNAL MEDICINE

## 2017-06-03 PROCEDURE — 25010000002 FUROSEMIDE PER 20 MG: Performed by: INTERNAL MEDICINE

## 2017-06-03 PROCEDURE — 80069 RENAL FUNCTION PANEL: CPT | Performed by: INTERNAL MEDICINE

## 2017-06-03 RX ORDER — ASPIRIN 325 MG
325 TABLET ORAL DAILY
Qty: 30 TABLET | Refills: 3 | Status: SHIPPED | OUTPATIENT
Start: 2017-06-03 | End: 2020-07-06 | Stop reason: HOSPADM

## 2017-06-03 RX ADMIN — APIXABAN 5 MG: 5 TABLET, FILM COATED ORAL at 08:47

## 2017-06-03 RX ADMIN — AMIODARONE HYDROCHLORIDE 200 MG: 200 TABLET ORAL at 08:47

## 2017-06-03 RX ADMIN — CARVEDILOL 6.25 MG: 6.25 TABLET, FILM COATED ORAL at 08:47

## 2017-06-03 RX ADMIN — LISINOPRIL 20 MG: 20 TABLET ORAL at 08:47

## 2017-06-03 RX ADMIN — POTASSIUM CHLORIDE 20 MEQ: 750 CAPSULE, EXTENDED RELEASE ORAL at 08:48

## 2017-06-03 RX ADMIN — FUROSEMIDE 40 MG: 10 INJECTION, SOLUTION INTRAMUSCULAR; INTRAVENOUS at 08:48

## 2017-06-03 RX ADMIN — LAMOTRIGINE 150 MG: 100 TABLET ORAL at 08:47

## 2017-06-03 RX ADMIN — ASPIRIN 325 MG ORAL TABLET 325 MG: 325 PILL ORAL at 08:47

## 2017-06-03 RX ADMIN — AMLODIPINE BESYLATE 10 MG: 10 TABLET ORAL at 08:47

## 2017-06-03 RX ADMIN — PANTOPRAZOLE SODIUM 40 MG: 40 TABLET, DELAYED RELEASE ORAL at 05:18

## 2017-06-03 NOTE — PROGRESS NOTES
Continued Stay Note  [facility]     Patient Name: Pancho Bonner  MRN: [id number]  Today's Date: 6/3/2017    Admit Date: 6/1/2017          Discharge Plan       06/03/17 1308    Case Management/Social Work Plan    Additional Comments Spoke with patient due to inquiry regarding prescription coverage for eliquis. I called [facility] in [place] [facility] and spoke with Virgil at [phone]. The patient did not require a PA and his coverage is through [facility] medicare replacement. He has a $400 deductable he has to meet first. This makes his 30 day fill  cost $388.18. Once he meets his deductable then his co pay will be substantially less. I informed the patient of these findings as well as gave him a free 30 day trial card for eliquis. He and his wife verbalized understanding. I spoke with Dr. Mojica regarding prescription. If any further needs please call Mirlande ext . [phone].               Discharge Codes     None            Mirlande Messina

## 2017-06-03 NOTE — PLAN OF CARE
Problem: Stroke (Ischemic) (Adult)  Intervention: Prevent/Manage DVT/VTE Risk    06/03/17 0145   Support Surgical/Anesthesia Recovery   Venous Thromboembolism Prevent/Manage bilateral;sequential compression devices on;compression stockings on;anticoagulant therapy maintained;fluids promoted

## 2017-06-03 NOTE — DISCHARGE SUMMARY
DISCHARGE SUMMARY     Admit date: 6/1/2017  Date of Discharge:  6/3/2017    Discharge Diagnoses  Hospital Problem List     * (Principal)Acute embolic Left MCA CVA at admission. INR subtherapeutic at 1.1. 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 On chronic Coumadin at admission, but subtherapeutic    Overview Addendum 6/1/2017 10:54 AM by MADELEINE Hutton     apical thrombus on chronic coumadin, subtherapeutic today         Diabetes mellitus    History of Ischemic cardiomyopathy. EF 30% in past, EF 50% on echo 6/1/17    Overview Addendum 6/1/2017 12:11 PM by KAN Fuchs     · EF 30%, has AICD PPM (DDD), Medtr[employer], follows with Dr. Pompa at [facility]  · Medtronic Gen change 5/26/17 Dr. Ho [facility]  · INR sub therapeutic at time of Gen. Change.         Hyperlipidemia    Hypertension    H/O NSVT status post PPM/ICD    Overview Addendum 6/1/2017 12:23 PM by KAN Fuchs     · ICD Shock 5/25/17 with report of slow  bpm per Dr. Ho/ [place]          Coronary artery disease with CABG in 2000, and 2016. Stent placed February 2017    Overview Addendum 6/1/2017 12:27 PM by KAN Fuchs     · CABG 2000 LIMA to LAD  · Redo CABG 2016 Hattie to PDA, SVG to D1, and SVG to OM1 and OM2  · Recent NONSTEMI at [facility] Dr. Josiah Bedolla 2/16 revealing Patent LIMA to diffusely diseased LAD and Three vessel disease treated with Medical therapy recommended.   · Recurrent VT: [facility] Dr. Magallanes 2/17 Stent placed in SVG to OM1 and OM2/ Amiodarone therapy  · Recurrent VT: [facility] admit with [facility]: Medical therapy 5/25/17  · Echocardiogram EF 45% 5/17 [facility]         History of PAF    Overview Signed 6/1/2017 12:30 PM by KAN Fuchs     · Chadsvasc=7  · Comadin Therapy               Past Surgical History:   Procedure Laterality Date   • CARDIAC CATHETERIZATION  02/2016    SJM, 3 vessel  disease, patent LIMA to LAD bypass graft    • CARDIAC CATHETERIZATION  05/25/2017    M, severe 3 vessel disease, patent LIMA to LAD bypass graft, recommend medical management    • CARDIAC DEFIBRILLATOR PLACEMENT  08/2009    w/ PPM Medtronic (DDD)   • COLONOSCOPY W/ BIOPSIES     • CORONARY ANGIOPLASTY WITH STENT PLACEMENT     • CORONARY ARTERY BYPASS GRAFT  2000    [facility]   • INSERT / REPLACE / REMOVE PACEMAKER  05/26/2017    Medtronic generator change, Dr. Ho, [facility]    • INTERVENTIONAL RADIOLOGY PROCEDURE Bilateral 6/1/2017    Procedure: Carotid Cerebral Angiogram;  Surgeon: Wil Rodrigez MD;  Location: [facility] CATH INVASIVE LOCATION;  Service:        History of Present Illness    Patient is a 77 y.o. male presented with: CVA (cerebral vascular accident)    Mr. Bonner is a 76 y/o WM who was transferred to [facility] from [facility] for right sided weakness and inability to speak.     He does not recall what happened to him and there is no family at bedside. Per the chart, he was last known well last night at bedtime.  He woke up with right sided weakness and was not able to speak. EMS was called and he was brought to [facility]. NIH was 10. He had a room air sat 97%, temp 97.7, P 60, RR 18, 151/71. CT Head had no acute findings. CT cerebral perfusion scan had significant size areas of abnormal perfusion in the left cerebral hemisphere with evidence of significant ischemic penumbra. He had a WBC 6.5, Hgb 12, HCT 37.9, plts 235, Troponin 1.04, glucose 115, BUN 16, Cr 1.4, K 3.3, PT 13.5 and INR 1.1. He was taken to the cath lab emergently and had a thrombectomy and intra-arterial TPA. His speech and right-sided weakness has already improved according to the patient and family.      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 He has known coronary artery disease and has previously undergone CABG and stents. He was just admitted to [facility] a week ago for a  myocardial infarction, and was discharged 5/26/17. During that hospitalization he underwent a LHC on 5/25/17 that was unchanged since LHC in 2/2016. It showed severe three vessel disease with patent LIMA to LAD bypass graft and only medical management was recommended. I do not know what his EF was, but it is presumably low. During that hospitalization he also underwent a pacemaker generator change 5/26/17 prior to discharge. (He had previously undergone AICD/PPM placement for ICM and NSVT in 8/2009). He was taken off his coumadin during his hospitalization last week for this procedure, and although it was apparently resumed at discharge, upon his arrival at [facility] today his INR was only 1.1. There is no H&P or d/c summary for that hospitalization.    Hospital Course    He was admitted to the ICU post thrombectomy and intra-arterial tPA and had resolution of symptoms. Cardiology evaluation recommended Eliquis as anticoagulation of choice. He continued to do well and was back at baseline and was deemed ready for discharge home on 6/3/17. He had some questions about the financial viability of eliquis but the  worked with him so that it was an affordable option. He was discharged home in stable condition and will follow up in the stroke clinic, with cardiology and his PCP in one month.      Procedures Performed: Procedure(s):  Carotid Cerebral Angiogram     Consults:   Wil Hernández MD, Neurosurgery       Zak Zapata MD, Cardiology    Pertinent Test Results:     Results from last 7 days  Lab Units 06/03/17  0852   WBC 10*3/mm3 5.73   HEMOGLOBIN g/dL 10.8*   HEMATOCRIT % 36.2*   PLATELETS 10*3/mm3 200       Results from last 7 days  Lab Units 06/03/17  0852 06/02/17  1957 06/02/17  0449  06/01/17  0735   SODIUM mmol/L 141  --  142  --   --    POTASSIUM mmol/L 3.7 4.6 3.0*  --   --    CHLORIDE mmol/L 107  --  107  --   --    TOTAL CO2 mmol/L 30.0  --  28.0  --   --    BUN mg/dL 9  --  11  --   --     CREATININE mg/dL 0.90  --  1.00  --  1.40*   GLUCOSE mg/dL 99  --  95  < >  --    CALCIUM mg/dL 8.9  --  8.7  --   --    PHOSPHORUS mg/dL 2.4  --  2.5  --   --    < > = values in this interval not displayed.    Results from last 7 days  Lab Units 06/02/17  0449   HEMOGLOBIN A1C % 5.60       Condition on Discharge:  Stable    Discharge Disposition: Home or Self Care    Discharge Medications:  Pancho Bonner   Home Medication Instructions LIANA:[id number]    Printed on:06/04/17 1201   Medication Information                      ALPRAZolam (XANAX) 0.5 MG tablet  TAKE 1/2-1 TABLET BY MOUTH DAILY AS NEEDED             amiodarone (PACERONE) 200 MG tablet  Take 200 mg by mouth Daily.             amLODIPine (NORVASC) 10 MG tablet  Take 10 mg by mouth Daily.             apixaban (ELIQUIS) 5 MG tablet tablet  Take 1 tablet by mouth Every 12 (Twelve) Hours.             aspirin 325 MG tablet  Take 1 tablet by mouth Daily.             atorvastatin (LIPITOR) 40 MG tablet  Take 40 mg by mouth Every Night.             carvedilol (COREG) 6.25 MG tablet  Take 6.25 mg by mouth 2 (Two) Times a Day.             Ferrous Gluconate 325 (36 FE) MG tablet  TAKE 1 TABLET EVERY DAY             furosemide (LASIX) 40 MG tablet  Take 40 mg by mouth Daily.             lamoTRIgine (LaMICtal) 150 MG tablet  Take 150 mg by mouth 2 (Two) Times a Day.             lisinopril (PRINIVIL,ZESTRIL) 30 MG tablet  Take 30 mg by mouth Daily.             metFORMIN (GLUCOPHAGE) 500 MG tablet  Take 500 mg by mouth 2 (Two) Times a Day.             pantoprazole (PROTONIX) 40 MG EC tablet  Take 40 mg by mouth Daily.             potassium chloride (KLOR-CON) 20 MEQ CR tablet  Take 20 mEq by mouth Daily.                 Discharge Diet:   Diet Instructions     Diet: Regular, Consistent Carbohydrate, Cardiac; Thin Liquids, No Restrictions       Discharge Diet:   Regular  Consistent Carbohydrate  Cardiac      Fluid Consistency:  Thin Liquids, No Restrictions                  Activity at Discharge:   Activity Instructions     Activity as Tolerated                     Follow-up Appointments  Future Appointments  Date Time Provider Department Center   7/6/2017 9:00 AM  [facility]  [facility]   7/27/2017 10:00 AM MD ADRI Sapp None     Additional Instructions for the Follow-ups that You Need to Schedule     Discharge Follow-Up With Specified Provider    As directed    To:  Dr. Rodrigez   Follow Up:  1 Month   Has the patient’s follow-up appointment been scheduled and documented in the discharge navigator?:  Patient to schedule, documented in the follow-up section       Discharge Follow-up with Specialty    As directed    Specialty:  Dr. Fall   Follow Up:  1 Month   Has the patient’s follow-up appointment been scheduled and documented in the discharge navigator?:  Patient to schedule, documented in the follow-up section                 Test Results Pending at Discharge       MADELEINE John  06/04/17  12:01 PM    Discharge Time Spent: 25  Minutes

## 2017-06-05 ENCOUNTER — APPOINTMENT (OUTPATIENT)
Dept: CARDIAC REHAB | Facility: HOSPITAL | Age: 77
End: 2017-06-05

## 2017-06-06 ENCOUNTER — APPOINTMENT (OUTPATIENT)
Dept: CARDIAC REHAB | Facility: HOSPITAL | Age: 77
End: 2017-06-06

## 2017-06-06 NOTE — THERAPY DISCHARGE NOTE
Acute Care - Physical Therapy Discharge Summary  [facility]       Patient Name: Pancho Bonner  : 1940  MRN: [id number]    Today's Date: 2017  Onset of Illness/Injury or Date of Surgery Date: 17    Date of Referral to PT: 17  Referring Physician: MADELEINE Cabello      Admit Date: 2017      PT Recommendation and Plan    Visit Dx:    ICD-10-CM ICD-9-CM   1. Cerebrovascular accident (CVA), unspecified mechanism I63.9 434.91   2. Dysarthria R47.1 784.51   3. Elevated troponin R79.89 790.6   4. Elevated serum creatinine R79.89 790.99   5. ST elevation myocardial infarction (STEMI), unspecified artery I21.3 410.90   6. Impaired mobility and ADLs Z74.09 799.89   7. Impaired functional mobility, balance, gait, and endurance Z74.09 V49.89             Outcome Measures       17 1300          Modified Lauren Scale    Modified [place] Scale 3 - Moderate disability.  Requiring some help, but able to walk without assistance.   per PT eval  -LS        User Key  (r) = Recorded By, (t) = Taken By, (c) = Cosigned By    Initials Name Provider Type    LS Darshana Gomez, PT Physical Therapist                      IP PT Goals       17 0838          Bed Mobility PT LTG    Bed Mobility PT LTG, Date Established 17  -LS      Bed Mobility PT LTG, Time to Achieve 2 wks  -LS      Bed Mobility PT LTG, Activity Type supine to sit/sit to supine  -LS      Bed Mobility PT LTG, [place] Level independent  -LS      Transfer Training PT LTG    Transfer Training PT LTG, Date Established 17  -LS      Transfer Training PT LTG, Time to Achieve 2 wks  -LS      Transfer Training PT LTG, Activity Type sit to stand/stand to sit  -LS      Transfer Training PT LTG, [place] Level independent  -LS      Gait Training PT LTG    Gait Training Goal PT LTG, Date Established 17  -LS      Gait Training Goal PT LTG, Time to Achieve 2 wks  -LS      Gait Training Goal PT LTG, [place] Level  supervision required  -LS      Gait Training Goal PT LTG, Distance to Achieve 600  -LS      Stair Training PT LTG    Stair Training Goal PT LTG, Date Established 06/02/17  -LS      Stair Training Goal PT LTG, Time to Achieve 2 wks  -LS      Stair Training Goal PT LTG, Number of Steps 8  -LS      Stair Training Goal PT LTG, [place] Level supervision required  -LS      Stair Training Goal PT LTG, Assist Device 1 handrail  -LS        User Key  (r) = Recorded By, (t) = Taken By, (c) = Cosigned By    Initials Name Provider Type    LS Darshana Gomez, PT Physical Therapist              PT Discharge Summary  Anticipated Discharge Disposition: home with assist, home with outpatient services (return to OP cardiac rehab (after MD clearance) )  Reason for Discharge: Discharge from facility  Outcomes Achieved: Refer to plan of care for updates on goals achieved  Discharge Destination: Home      Darshana Gomez, PT   6/6/2017

## 2017-06-07 ENCOUNTER — APPOINTMENT (OUTPATIENT)
Dept: CARDIAC REHAB | Facility: HOSPITAL | Age: 77
End: 2017-06-07

## 2017-06-08 ENCOUNTER — APPOINTMENT (OUTPATIENT)
Dept: CARDIAC REHAB | Facility: HOSPITAL | Age: 77
End: 2017-06-08

## 2017-06-09 ENCOUNTER — DOCUMENTATION (OUTPATIENT)
Dept: NEUROSURGERY | Facility: CLINIC | Age: 77
End: 2017-06-09

## 2017-06-09 ENCOUNTER — APPOINTMENT (OUTPATIENT)
Dept: GENERAL RADIOLOGY | Facility: HOSPITAL | Age: 77
End: 2017-06-09

## 2017-06-09 ENCOUNTER — APPOINTMENT (OUTPATIENT)
Dept: CARDIAC REHAB | Facility: HOSPITAL | Age: 77
End: 2017-06-09

## 2017-06-09 ENCOUNTER — HOSPITAL ENCOUNTER (OUTPATIENT)
Facility: HOSPITAL | Age: 77
Setting detail: OBSERVATION
LOS: 1 days | Discharge: HOME | End: 2017-06-11
Attending: EMERGENCY MEDICINE | Admitting: HOSPITALIST

## 2017-06-09 ENCOUNTER — APPOINTMENT (OUTPATIENT)
Dept: CT IMAGING | Facility: HOSPITAL | Age: 77
End: 2017-06-09

## 2017-06-09 ENCOUNTER — APPOINTMENT (OUTPATIENT)
Dept: MRI IMAGING | Facility: HOSPITAL | Age: 77
End: 2017-06-09
Attending: EMERGENCY MEDICINE

## 2017-06-09 ENCOUNTER — TELEPHONE (OUTPATIENT)
Dept: PULMONOLOGY | Facility: CLINIC | Age: 77
End: 2017-06-09

## 2017-06-09 ENCOUNTER — TELEPHONE (OUTPATIENT)
Dept: CARDIOLOGY | Facility: CLINIC | Age: 77
End: 2017-06-09

## 2017-06-09 DIAGNOSIS — I25.5 ISCHEMIC CARDIOMYOPATHY: ICD-10-CM

## 2017-06-09 DIAGNOSIS — R11.2 NAUSEA AND VOMITING, INTRACTABILITY OF VOMITING NOT SPECIFIED, UNSPECIFIED VOMITING TYPE: ICD-10-CM

## 2017-06-09 DIAGNOSIS — N17.9 ACUTE RENAL FAILURE, UNSPECIFIED ACUTE RENAL FAILURE TYPE (HCC): ICD-10-CM

## 2017-06-09 DIAGNOSIS — Z78.9 POOR TOLERANCE FOR AMBULATION: ICD-10-CM

## 2017-06-09 DIAGNOSIS — E87.6 HYPOKALEMIA: ICD-10-CM

## 2017-06-09 DIAGNOSIS — R77.8 ELEVATED TROPONIN: ICD-10-CM

## 2017-06-09 DIAGNOSIS — Z74.09 IMPAIRED FUNCTIONAL MOBILITY, BALANCE, GAIT, AND ENDURANCE: ICD-10-CM

## 2017-06-09 DIAGNOSIS — R42 DIZZINESS: Primary | ICD-10-CM

## 2017-06-09 PROBLEM — G40.209 COMPLEX PARTIAL SEIZURE (HCC): Status: ACTIVE | Noted: 2017-06-09

## 2017-06-09 PROBLEM — IMO0002 STROKE SYNDROME: Status: ACTIVE | Noted: 2017-06-09

## 2017-06-09 PROBLEM — E86.0 DEHYDRATION: Status: ACTIVE | Noted: 2017-06-09

## 2017-06-09 PROBLEM — Z86.79: Status: ACTIVE | Noted: 2017-06-09

## 2017-06-09 LAB
ALBUMIN SERPL-MCNC: 4.5 G/DL (ref 3.2–4.8)
ALBUMIN/GLOB SERPL: 1.5 G/DL (ref 1.5–2.5)
ALP SERPL-CCNC: 65 U/L (ref 25–100)
ALT SERPL W P-5'-P-CCNC: 15 U/L (ref 7–40)
ANION GAP SERPL CALCULATED.3IONS-SCNC: 5 MMOL/L (ref 3–11)
AST SERPL-CCNC: 20 U/L (ref 0–33)
BASOPHILS # BLD AUTO: 0.01 10*3/MM3 (ref 0–0.2)
BASOPHILS NFR BLD AUTO: 0.2 % (ref 0–1)
BILIRUB SERPL-MCNC: 0.8 MG/DL (ref 0.3–1.2)
BILIRUB UR QL STRIP: NEGATIVE
BUN BLD-MCNC: 19 MG/DL (ref 9–23)
BUN/CREAT SERPL: 11.9 (ref 7–25)
CALCIUM SPEC-SCNC: 10.1 MG/DL (ref 8.7–10.4)
CHLORIDE SERPL-SCNC: 99 MMOL/L (ref 99–109)
CK SERPL-CCNC: 102 U/L (ref 26–174)
CLARITY UR: CLEAR
CO2 SERPL-SCNC: 36 MMOL/L (ref 20–31)
COLOR UR: YELLOW
CREAT BLD-MCNC: 1.6 MG/DL (ref 0.6–1.3)
DEPRECATED RDW RBC AUTO: 45.8 FL (ref 37–54)
EOSINOPHIL # BLD AUTO: 0.14 10*3/MM3 (ref 0.1–0.3)
EOSINOPHIL NFR BLD AUTO: 2.3 % (ref 0–3)
ERYTHROCYTE [DISTWIDTH] IN BLOOD BY AUTOMATED COUNT: 15.5 % (ref 11.3–14.5)
GFR SERPL CREATININE-BSD FRML MDRD: 42 ML/MIN/1.73
GLOBULIN UR ELPH-MCNC: 3.1 GM/DL
GLUCOSE BLD-MCNC: 118 MG/DL (ref 70–100)
GLUCOSE BLDC GLUCOMTR-MCNC: 110 MG/DL (ref 70–130)
GLUCOSE BLDC GLUCOMTR-MCNC: 83 MG/DL (ref 70–130)
GLUCOSE UR STRIP-MCNC: NEGATIVE MG/DL
HCT VFR BLD AUTO: 38.7 % (ref 38.9–50.9)
HGB BLD-MCNC: 12 G/DL (ref 13.1–17.5)
HGB UR QL STRIP.AUTO: NEGATIVE
IMM GRANULOCYTES # BLD: 0.02 10*3/MM3 (ref 0–0.03)
IMM GRANULOCYTES NFR BLD: 0.3 % (ref 0–0.6)
INR PPP: 1.14
KETONES UR QL STRIP: NEGATIVE
LEUKOCYTE ESTERASE UR QL STRIP.AUTO: NEGATIVE
LYMPHOCYTES # BLD AUTO: 1.16 10*3/MM3 (ref 0.6–4.8)
LYMPHOCYTES NFR BLD AUTO: 18.8 % (ref 24–44)
MAGNESIUM SERPL-MCNC: 1.9 MG/DL (ref 1.3–2.7)
MCH RBC QN AUTO: 25.2 PG (ref 27–31)
MCHC RBC AUTO-ENTMCNC: 31 G/DL (ref 32–36)
MCV RBC AUTO: 81.1 FL (ref 80–99)
MONOCYTES # BLD AUTO: 0.66 10*3/MM3 (ref 0–1)
MONOCYTES NFR BLD AUTO: 10.7 % (ref 0–12)
NEUTROPHILS # BLD AUTO: 4.19 10*3/MM3 (ref 1.5–8.3)
NEUTROPHILS NFR BLD AUTO: 67.7 % (ref 41–71)
NITRITE UR QL STRIP: NEGATIVE
PH UR STRIP.AUTO: 5.5 [PH] (ref 5–8)
PLAT MORPH BLD: NORMAL
PLATELET # BLD AUTO: 258 10*3/MM3 (ref 150–450)
PMV BLD AUTO: 10.3 FL (ref 6–12)
POTASSIUM BLD-SCNC: 3.4 MMOL/L (ref 3.5–5.5)
PROT SERPL-MCNC: 7.6 G/DL (ref 5.7–8.2)
PROT UR QL STRIP: NEGATIVE
PROTHROMBIN TIME: 12.5 SECONDS (ref 9.6–11.5)
RBC # BLD AUTO: 4.77 10*6/MM3 (ref 4.2–5.76)
RBC MORPH BLD: NORMAL
SODIUM BLD-SCNC: 140 MMOL/L (ref 132–146)
SP GR UR STRIP: 1.01 (ref 1–1.03)
TROPONIN I SERPL-MCNC: 1.24 NG/ML (ref 0–0.07)
TROPONIN I SERPL-MCNC: 1.27 NG/ML (ref 0–0.07)
TSH SERPL DL<=0.05 MIU/L-ACNC: 2.14 MIU/ML (ref 0.35–5.35)
UROBILINOGEN UR QL STRIP: NORMAL
WBC MORPH BLD: NORMAL
WBC NRBC COR # BLD: 6.18 10*3/MM3 (ref 3.5–10.8)

## 2017-06-09 PROCEDURE — 99285 EMERGENCY DEPT VISIT HI MDM: CPT

## 2017-06-09 PROCEDURE — 84484 ASSAY OF TROPONIN QUANT: CPT

## 2017-06-09 PROCEDURE — 99024 POSTOP FOLLOW-UP VISIT: CPT | Performed by: PHYSICIAN ASSISTANT

## 2017-06-09 PROCEDURE — 83735 ASSAY OF MAGNESIUM: CPT | Performed by: EMERGENCY MEDICINE

## 2017-06-09 PROCEDURE — 70450 CT HEAD/BRAIN W/O DYE: CPT

## 2017-06-09 PROCEDURE — 99214 OFFICE O/P EST MOD 30 MIN: CPT | Performed by: INTERNAL MEDICINE

## 2017-06-09 PROCEDURE — 71010 HC CHEST PA OR AP: CPT

## 2017-06-09 PROCEDURE — 80053 COMPREHEN METABOLIC PANEL: CPT | Performed by: EMERGENCY MEDICINE

## 2017-06-09 PROCEDURE — 96372 THER/PROPH/DIAG INJ SC/IM: CPT

## 2017-06-09 PROCEDURE — 99223 1ST HOSP IP/OBS HIGH 75: CPT | Performed by: HOSPITALIST

## 2017-06-09 PROCEDURE — 84443 ASSAY THYROID STIM HORMONE: CPT | Performed by: EMERGENCY MEDICINE

## 2017-06-09 PROCEDURE — 82550 ASSAY OF CK (CPK): CPT | Performed by: EMERGENCY MEDICINE

## 2017-06-09 PROCEDURE — G0378 HOSPITAL OBSERVATION PER HR: HCPCS

## 2017-06-09 PROCEDURE — 85025 COMPLETE CBC W/AUTO DIFF WBC: CPT | Performed by: EMERGENCY MEDICINE

## 2017-06-09 PROCEDURE — 81003 URINALYSIS AUTO W/O SCOPE: CPT | Performed by: EMERGENCY MEDICINE

## 2017-06-09 PROCEDURE — 85610 PROTHROMBIN TIME: CPT | Performed by: EMERGENCY MEDICINE

## 2017-06-09 PROCEDURE — 25010000002 ENOXAPARIN PER 10 MG

## 2017-06-09 PROCEDURE — 82962 GLUCOSE BLOOD TEST: CPT

## 2017-06-09 PROCEDURE — 93005 ELECTROCARDIOGRAM TRACING: CPT | Performed by: EMERGENCY MEDICINE

## 2017-06-09 PROCEDURE — 85007 BL SMEAR W/DIFF WBC COUNT: CPT | Performed by: EMERGENCY MEDICINE

## 2017-06-09 RX ORDER — DOCUSATE SODIUM 100 MG/1
100 CAPSULE, LIQUID FILLED ORAL 2 TIMES DAILY
Status: DISCONTINUED | OUTPATIENT
Start: 2017-06-09 | End: 2017-06-11 | Stop reason: HOSPADM

## 2017-06-09 RX ORDER — AMLODIPINE BESYLATE 5 MG/1
5 TABLET ORAL DAILY
Status: DISCONTINUED | OUTPATIENT
Start: 2017-06-10 | End: 2017-06-11 | Stop reason: HOSPADM

## 2017-06-09 RX ORDER — NICOTINE POLACRILEX 4 MG
15 LOZENGE BUCCAL
Status: DISCONTINUED | OUTPATIENT
Start: 2017-06-09 | End: 2017-06-11 | Stop reason: HOSPADM

## 2017-06-09 RX ORDER — FERROUS SULFATE 325(65) MG
325 TABLET ORAL
Status: DISCONTINUED | OUTPATIENT
Start: 2017-06-10 | End: 2017-06-11 | Stop reason: HOSPADM

## 2017-06-09 RX ORDER — ACETAMINOPHEN 325 MG/1
650 TABLET ORAL EVERY 4 HOURS PRN
Status: DISCONTINUED | OUTPATIENT
Start: 2017-06-09 | End: 2017-06-11 | Stop reason: HOSPADM

## 2017-06-09 RX ORDER — CARVEDILOL 6.25 MG/1
6.25 TABLET ORAL 2 TIMES DAILY
Status: DISCONTINUED | OUTPATIENT
Start: 2017-06-09 | End: 2017-06-11 | Stop reason: HOSPADM

## 2017-06-09 RX ORDER — SODIUM CHLORIDE 9 MG/ML
50 INJECTION, SOLUTION INTRAVENOUS CONTINUOUS
Status: DISCONTINUED | OUTPATIENT
Start: 2017-06-09 | End: 2017-06-11 | Stop reason: HOSPADM

## 2017-06-09 RX ORDER — DEXTROSE MONOHYDRATE 25 G/50ML
25 INJECTION, SOLUTION INTRAVENOUS
Status: DISCONTINUED | OUTPATIENT
Start: 2017-06-09 | End: 2017-06-11 | Stop reason: HOSPADM

## 2017-06-09 RX ORDER — WARFARIN SODIUM 7.5 MG/1
7.5 TABLET ORAL ONCE
Status: COMPLETED | OUTPATIENT
Start: 2017-06-09 | End: 2017-06-09

## 2017-06-09 RX ORDER — POTASSIUM CHLORIDE 750 MG/1
20 CAPSULE, EXTENDED RELEASE ORAL ONCE
Status: COMPLETED | OUTPATIENT
Start: 2017-06-09 | End: 2017-06-09

## 2017-06-09 RX ORDER — SODIUM CHLORIDE 0.9 % (FLUSH) 0.9 %
1-10 SYRINGE (ML) INJECTION AS NEEDED
Status: DISCONTINUED | OUTPATIENT
Start: 2017-06-09 | End: 2017-06-11 | Stop reason: HOSPADM

## 2017-06-09 RX ORDER — AMLODIPINE BESYLATE 10 MG/1
10 TABLET ORAL DAILY
Status: DISCONTINUED | OUTPATIENT
Start: 2017-06-10 | End: 2017-06-09

## 2017-06-09 RX ORDER — PANTOPRAZOLE SODIUM 40 MG/1
40 TABLET, DELAYED RELEASE ORAL
Status: DISCONTINUED | OUTPATIENT
Start: 2017-06-10 | End: 2017-06-11 | Stop reason: HOSPADM

## 2017-06-09 RX ORDER — WARFARIN SODIUM 5 MG/1
5 TABLET ORAL
Status: DISCONTINUED | OUTPATIENT
Start: 2017-06-10 | End: 2017-06-11 | Stop reason: HOSPADM

## 2017-06-09 RX ORDER — ATORVASTATIN CALCIUM 40 MG/1
40 TABLET, FILM COATED ORAL NIGHTLY
Status: DISCONTINUED | OUTPATIENT
Start: 2017-06-09 | End: 2017-06-11 | Stop reason: HOSPADM

## 2017-06-09 RX ORDER — LAMOTRIGINE 100 MG/1
150 TABLET ORAL EVERY 12 HOURS SCHEDULED
Status: DISCONTINUED | OUTPATIENT
Start: 2017-06-09 | End: 2017-06-11 | Stop reason: HOSPADM

## 2017-06-09 RX ORDER — AMIODARONE HYDROCHLORIDE 200 MG/1
200 TABLET ORAL DAILY
Status: DISCONTINUED | OUTPATIENT
Start: 2017-06-10 | End: 2017-06-11 | Stop reason: HOSPADM

## 2017-06-09 RX ORDER — ASPIRIN 325 MG
325 TABLET ORAL DAILY
Status: DISCONTINUED | OUTPATIENT
Start: 2017-06-10 | End: 2017-06-11 | Stop reason: HOSPADM

## 2017-06-09 RX ORDER — BISACODYL 10 MG
10 SUPPOSITORY, RECTAL RECTAL DAILY PRN
Status: DISCONTINUED | OUTPATIENT
Start: 2017-06-09 | End: 2017-06-11 | Stop reason: HOSPADM

## 2017-06-09 RX ADMIN — SODIUM CHLORIDE 125 ML/HR: 9 INJECTION, SOLUTION INTRAVENOUS at 13:16

## 2017-06-09 RX ADMIN — CARVEDILOL 6.25 MG: 6.25 TABLET, FILM COATED ORAL at 17:48

## 2017-06-09 RX ADMIN — DOCUSATE SODIUM 100 MG: 100 CAPSULE, LIQUID FILLED ORAL at 17:48

## 2017-06-09 RX ADMIN — POTASSIUM CHLORIDE 20 MEQ: 750 CAPSULE, EXTENDED RELEASE ORAL at 14:26

## 2017-06-09 RX ADMIN — WARFARIN SODIUM 7.5 MG: 7.5 TABLET ORAL at 17:48

## 2017-06-09 RX ADMIN — SODIUM CHLORIDE 125 ML/HR: 9 INJECTION, SOLUTION INTRAVENOUS at 20:33

## 2017-06-09 RX ADMIN — ATORVASTATIN CALCIUM 40 MG: 40 TABLET, FILM COATED ORAL at 20:30

## 2017-06-09 RX ADMIN — LAMOTRIGINE 150 MG: 100 TABLET ORAL at 20:30

## 2017-06-09 RX ADMIN — ENOXAPARIN SODIUM 80 MG: 80 INJECTION SUBCUTANEOUS at 17:48

## 2017-06-09 NOTE — PROGRESS NOTES
Patient Neurologically intact. No CMD, no facial Droop, 5/5 strength, 5/5 sensation.    Ct of head review with Dr. Lott. No new Infarcts or hemorrhage. Old scattered Ischemic changes. Recommend continued Eliquis. Patient being admitted for other problems. OK for DC from NS standpoint.     Michael Villafana Pa-C

## 2017-06-09 NOTE — TELEPHONE ENCOUNTER
Left patient a voicemail returning his phone call instructing him to call our office back with his concerns.

## 2017-06-09 NOTE — TELEPHONE ENCOUNTER
Mrs Bonner called to report that Mr Bonner has experienced headaches and other side effects and will no longer take his Eliquis.  She would like to know how to go about getting him back on his Coumadin.  She'll retry calling Dr. Rodrigez's office but says they told her they would not change his prescription since they were not the original prescribers.  His Eliquis was prescribed by TIANA Bhatti, who saw him in the hospital, but he has not been seen in our office.  I explained to Mrs. Bonner lab work would be needed to appropriately dose his Coumadin and I'll be unable to make this change for her.  She stated she would contact his primary care

## 2017-06-09 NOTE — CONSULTS
[facility] at [facility]  Cardiovascular Consultation Note    Reason for consultation: Elevated troponin    History of Present Illness:  This is a 77-year-old white male with extensive past medical history including ischemic heart disease for which he had bypass surgery twice.  In February he had firing of his AICD when he was cathetered stents placed in the vein graft to the obtuse marginals.  He was also Another time which showed diffuse disease but no intervention was performed.  He was here recently after he had acute stroke with dysarthria and weakness of his upper extremity and leg.  He was taken to the interventional lab where he had a mechanical thrombectomy in TPA he had excellent neurologic recovery.  The only persistent and yet when he left was word finding.  An echocardiogram and he is here recently also and that shows EF is now in the 50s.  The patient was started on Eliquis when he was discharged.  He states for the last couple days he spelled that he just felt really fatigued.  Last evening he had the onset of vertigo he was really staggering and this was followed by severe nausea and vomiting.  He denies tightness heaviness squeezing pressure chest jaw throat arms he's had no shortness of breath with no diaphoresis no palpitations no firing of his AICD.  States he been taking his medicines faithfully has had no edema orthopnea.  Of note when his AICD fired he was having no chest pain.  Patient was discharged here also on amiodarone.  The patient no longer was to take Eliquis because of the cost he was to go back to warfarin.  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  In the patient just didn't feel well today buddies had no nausea vomiting or severe vertigo    Cardiac risk factors: Age greater than 55 #2 male sex #3 hyperlipidemia #4 documented prior CAD diabetes mellitus or 6 hypertension    Past medical and surgical  history, social and family history reviewed in EMR.    REVIEW OF SYSTEMS:     Past Medical History:   Diagnosis Date   • Anxiety    • Arnold-Chiari malformation, type I     followed by Dr. Martinez, but last note available was 2014   • BPH (benign prostatic hyperplasia)    • Chronic Pleural effusion on right    • Complex partial seizure     followed by Dr. Martinez, but last note available was 2014   • Congestive heart failure     follows with Dr. Pompa at [facility], EF 30%   • Coronary artery disease     on ASA/plavix   • CVA (cerebral vascular accident)     apical thrombus on chronic coumadin   • Diabetes mellitus    • ED (erectile dysfunction)    • GERD (gastroesophageal reflux disease)    • Hyperlipidemia    • Hypertension    • Ischemic cardiomyopathy    • Myocardial infarction    • Non-sustained ventricular tachycardia      Past Surgical History:   Procedure Laterality Date   • CARDIAC CATHETERIZATION  02/2016    [facility], 3 vessel disease, patent LIMA to LAD bypass graft    • CARDIAC CATHETERIZATION  05/25/2017    [facility], severe 3 vessel disease, patent LIMA to LAD bypass graft, recommend medical management    • CARDIAC DEFIBRILLATOR PLACEMENT  08/2009    w/ PPM Medtronic (DDD)   • COLONOSCOPY W/ BIOPSIES     • CORONARY ANGIOPLASTY WITH STENT PLACEMENT     • CORONARY ARTERY BYPASS GRAFT  2000    [facility]   • INSERT / REPLACE / REMOVE PACEMAKER  05/26/2017    Medtronic generator change, Dr. Ho, [facility]    • INTERVENTIONAL RADIOLOGY PROCEDURE Bilateral 6/1/2017    Procedure: Carotid Cerebral Angiogram;  Surgeon: Wil Rodrigez MD;  Location: [facility] INVASIVE LOCATION;  Service:      Social History     Social History   • Marital status:      Spouse name: N/A   • Number of children: N/A   • Years of education: N/A     Occupational History   •  Retired     Social History Main Topics   • Smoking status: Never Smoker   • Smokeless tobacco: Never Used   • Alcohol use No   • Drug use: No   • Sexual activity: Defer       Comment:      Other Topics Concern   • Not on file     Social History Narrative     Family History   Problem Relation Age of Onset   • Diabetes Mother    • Hypertension Mother    • Heart disease Mother    • Heart disease Father    • Stroke Father    • Heart disease Sister        H&P ROS reviewed and pertinent CV ROS as noted in HPI.    Cardiac:  The patient denies firing of his AICD is in no palpitations no chest pain no neck or jaw pain  Respiratory:  He has had no hemoptysis no dyspnea on exertion no PND orthopnea  Lower Extremities:  No edema or claudication  GI:  Complains of constipation that he had severe nausea and vomiting last evening it was brown in color  Neuro:  He complains of this vertigo last evening which is quite severe is having some of it again today but it's much milder.  He also felt like his face was burning today.  There's been no worsening of his word finding issue and no focal extremity weakness  Hematology:  No bleeding diathesis no bruisability petechiae remainder of the review of systems has been obtained and is negative except for those covered above        Physical Exam: General  well-developed white male at a 60° angle in bed no acute distress.  He's pleasant cooperative       HEENT: Tongue is midline facial expressions are symmetrical is a soft carotid bruit on the right.  When his head is turned to the left suddenly he gets mild visual abnormalities.  There have no icterus       Respiratory:  Equal bilateral symmetrical expansion and clear to auscultation bilaterally       Cardiovascular: Regular rate and rhythm with a systolic murmur no edema to palpation       GI: Positive bowel sounds       Lower Extremities: No edema no peripheral lesions       Neuro: Cranial nerves II through XII are grossly normal he moves all 4 extremities his hand  strength is equal bilaterally 5 over 5       Skin:  Warm and dry with no edema to palpation       Psych: When ×3 pleasant affect    Results Review: I personally reviewed the patient's record including my notes last time he was here recently.  Patient was discharged on Eliquis when he left amiodarone.  His creatinine is elevated at 1.6 and previously was normal at the time of discharge.  He's also hypokalemic.  EKG shows sinus rhythm no acute ischemia.  Troponins elevated at 1.24 but was 1.04 when he was here a week ago and he's never had a normal troponin  I personally reviewed his echocardiogram and it shows his ES in the 50s         Vital Sign Min/Max for last 24 hours  Temp  Min: 97.8 °F (36.6 °C)  Max: 97.8 °F (36.6 °C)   BP  Min: 116/76  Max: 149/85   Pulse  Min: 65  Max: 84   Resp  Min: 18  Max: 18   SpO2  Min: 95 %  Max: 100 %   No Data Recorded      Intake/Output Summary (Last 24 hours) at 06/09/17 1430  Last data filed at 06/09/17 1316   Gross per 24 hour   Intake              900 ml   Output                0 ml   Net              900 ml             Current Facility-Administered Medications:   •  sodium chloride 0.9 % infusion, 125 mL/hr, Intravenous, Continuous, Arturo Aranda MD, Last Rate: 125 mL/hr at 06/09/17 1316, 125 mL/hr at 06/09/17 1316    Current Outpatient Prescriptions:   •  ALPRAZolam (XANAX) 0.5 MG tablet, TAKE 1/2-1 TABLET BY MOUTH DAILY AS NEEDED, Disp: , Rfl: 0  •  amiodarone (PACERONE) 200 MG tablet, Take 200 mg by mouth Daily., Disp: , Rfl:   •  amLODIPine (NORVASC) 10 MG tablet, Take 10 mg by mouth Daily., Disp: , Rfl:   •  apixaban (ELIQUIS) 5 MG tablet tablet, Take 1 tablet by mouth Every 12 (Twelve) Hours., Disp: 60 tablet, Rfl: 3  •  aspirin 325 MG tablet, Take 1 tablet by mouth Daily., Disp: 30 tablet, Rfl: 3  •  atorvastatin (LIPITOR) 40 MG tablet, Take 40 mg by mouth Every Night., Disp: , Rfl:   •  carvedilol (COREG) 6.25 MG tablet, Take 6.25 mg by mouth 2 (Two) Times a Day., Disp: , Rfl:   •  Ferrous Gluconate 325 (36 FE) MG tablet, TAKE 1 TABLET EVERY DAY, Disp: , Rfl: 1  •  furosemide (LASIX) 40 MG  tablet, Take 40 mg by mouth Daily., Disp: , Rfl:   •  lamoTRIgine (LaMICtal) 150 MG tablet, Take 150 mg by mouth 2 (Two) Times a Day., Disp: , Rfl:   •  lisinopril (PRINIVIL,ZESTRIL) 30 MG tablet, Take 30 mg by mouth Daily., Disp: , Rfl:   •  metFORMIN (GLUCOPHAGE) 500 MG tablet, Take 500 mg by mouth 2 (Two) Times a Day., Disp: , Rfl:   •  pantoprazole (PROTONIX) 40 MG EC tablet, Take 40 mg by mouth Daily., Disp: , Rfl:   •  potassium chloride (KLOR-CON) 20 MEQ CR tablet, Take 20 mEq by mouth Daily., Disp: , Rfl:     Lab Review:     Results from last 7 days  Lab Units 06/09/17  1236 06/03/17  0852   WBC 10*3/mm3 6.18 5.73   HEMOGLOBIN g/dL 12.0* 10.8*   PLATELETS 10*3/mm3 258 200       Results from last 7 days  Lab Units 06/09/17  1236 06/03/17  0852 06/02/17  1957   SODIUM mmol/L 140 141  --    POTASSIUM mmol/L 3.4* 3.7 4.6   TOTAL CO2 mmol/L 36.0* 30.0  --    BUN mg/dL 19 9  --    CREATININE mg/dL 1.60* 0.90  --    PHOSPHORUS mg/dL  --  2.4  --    GLUCOSE mg/dL 118* 99  --      Estimated Creatinine Clearance: 39.7 mL/min (by C-G formula based on Cr of 1.6).        .lipd  Lab Results   Component Value Date    TRIG 64 06/02/2017    HDL 31 (L) 06/02/2017    LDLDIRECT 59 06/02/2017    AST 20 06/09/2017    ALT 15 06/09/2017       Radiology Reports:  Imaging Results (last 72 hours)     Procedure Component Value Units Date/Time    XR Chest 1 View [[id number]] Updated:  06/09/17 1313    CT Head Without Contrast [[id number]] Collected:  06/09/17 1351     Updated:  06/09/17 1351    Narrative:       EXAMINATION: CT HEAD WITHOUT CONTRAST-06/09/2017:      INDICATION: Weakness, headache, code 19, change in neurologic status.     TECHNIQUE:  CT data set of the brain was performed without intravenous  contrast.     COMPARISON: Compared to previous studies 06/02/2017.     FINDINGS:   1.  There is marked small vessel microangiopathy with low attenuation in  the white matter and casie. This is chronic but impressive.  2.  The ocular  lens are symmetrical and intact and the conal contents  are normal.  3.  There is marked calcification of the basal vessels of the brain from  atherosclerotic disease.  4.  Evidence of new or evolving infarct is not identified. Intra-axial  or extracerebral hemorrhage is not appreciated and there is no edema or  midline shift.       Impression:       1.  Negative CT data set of the brain for acute focal abnormality.  Evolving infarct, hemorrhage or edema is not identified.  2.  Widespread low attenuation microangiopathy is identified throughout  the white matter and casie, not changed from 06/02/2017.  3.  Data sets were complete at 12:20 PM and the report rendered  immediately to the emergency physician in attendance.     D:  06/09/2017  E:  06/09/2017                   Assessment/Plan: 1 this patient has an elevated troponin of 1.24 but he's having no chest pain no shortness of breath no firing of his defibrillator.  Of note his troponin last time he was here was also elevated and of note his creatinines up to 1.6.  At this point time no ischemic evaluation needs to be performed  2 secondly he's had severe vertigo with nausea and vomiting not on this benign positional vertigo ordered something more onerous nevertheless his vertigo is much improved today  3 his creatinines elevated 1.6 I agree with IV fluids  4 he needs to have his potassium replaced  5 the patient last took Eliquis today and since he was to come off that he'll have to stay off of it for 2 days.  We can go ahead and give the patient either heparin or Lovenox and start warfarin in 2 days i.e. first dose on Sunday.  When the patient's radial be discharged can be sent out on Lovenox injections until his warfarin is therapeutic but is to be over several days he may achieve therapeutic level prior to discharge      Zak Zapata MD  06/09/17  2:30 PM

## 2017-06-09 NOTE — H&P
[facility] Medicine Services  HISTORY AND PHYSICAL    Primary Care Physician: Shar Obregon MD    Subjective     Chief Complaint:  Vertigo, nausea and vomiting    History of Present Illness: This is a 77 year old male with a PMH of CAD with Cabg 2000, VT with ICD 2009, CVA 61/17, DM, Seizures, BPH, HLD, HTN, Ischemic cardiomyopathy, CHF last Ef 50% on 6/1/17. Patient presents to [facility] ED with complains of vertigo, nausea and vomiting. Patient states he was sitting on the  last evening got up and he had extreme vertigo and had a hard time walking. He became very nauseated and  Vomited and he did have some blurry vision. The vertigo lasted for about one hour and did improve when he laid down. He states he did have some nausea this am but just did not feel well and was very fatigued. He was discharged on 6/3/17 with a CVA and since then he has been very fatigued and had a decreased appetite.         In ED: CXR neg, trop 1.24, UA negative, K 3.4, creat 1.6, CT of brain neg for any aucte focal abnormality.         Review of Systems   Constitutional: Positive for activity change and appetite change. Negative for chills and fever.   Eyes: Positive for visual disturbance.   Respiratory: Negative for shortness of breath and wheezing.    Cardiovascular: Negative for chest pain, palpitations and leg swelling.   Gastrointestinal: Positive for constipation, nausea and vomiting. Negative for diarrhea.   Genitourinary: Negative for dysuria.   Musculoskeletal: Positive for gait problem.   Neurological: Positive for weakness. Negative for facial asymmetry and speech difficulty.      Otherwise complete ROS performed and negative except as mentioned in the HPI.    Past Medical History:   Diagnosis Date   • Anxiety    • Arnold-Chiari malformation, type I     followed by Dr. Martinez, but last note available was 2014   • BPH (benign prostatic hyperplasia)    • Chronic Pleural effusion on right    •  Complex partial seizure     followed by Dr. Martinez, but last note available was 2014   • Congestive heart failure     follows with Dr. Pompa at [facility], EF 30%   • Coronary artery disease     on ASA/plavix   • CVA (cerebral vascular accident)     apical thrombus on chronic coumadin   • Diabetes mellitus    • ED (erectile dysfunction)    • GERD (gastroesophageal reflux disease)    • Hyperlipidemia    • Hypertension    • Ischemic cardiomyopathy    • Myocardial infarction    • Non-sustained ventricular tachycardia        Past Surgical History:   Procedure Laterality Date   • CARDIAC CATHETERIZATION  02/2016    [facility], 3 vessel disease, patent LIMA to LAD bypass graft    • CARDIAC CATHETERIZATION  05/25/2017    [facility], severe 3 vessel disease, patent LIMA to LAD bypass graft, recommend medical management    • CARDIAC DEFIBRILLATOR PLACEMENT  08/2009    w/ PPM Medtronic (DDD)   • COLONOSCOPY W/ BIOPSIES     • CORONARY ANGIOPLASTY WITH STENT PLACEMENT     • CORONARY ARTERY BYPASS GRAFT  2000    [facility]   • INSERT / REPLACE / REMOVE PACEMAKER  05/26/2017    Medtronic generator change, Dr. Ho, [facility]    • INTERVENTIONAL RADIOLOGY PROCEDURE Bilateral 6/1/2017    Procedure: Carotid Cerebral Angiogram;  Surgeon: Wil Rodrigez MD;  Location: [facility] INVASIVE LOCATION;  Service:        Family History   Problem Relation Age of Onset   • Diabetes Mother    • Hypertension Mother    • Heart disease Mother    • Heart disease Father    • Stroke Father    • Heart disease Sister    • Diabetes Other    • Heart disease Other    • Hypertension Other    • Stroke Other        Social History     Social History   • Marital status:      Spouse name: N/A   • Number of children: N/A   • Years of education: N/A     Occupational History   •  Retired     Social History Main Topics   • Smoking status: Never Smoker   • Smokeless tobacco: Never Used   • Alcohol use No   • Drug use: No   • Sexual activity: Defer      Comment:      Other  "Topics Concern   • Not on file     Social History Narrative       Medications:  Prescriptions Prior to Admission   Medication Sig Dispense Refill Last Dose   • ALPRAZolam (XANAX) 0.5 MG tablet TAKE 1/2-1 TABLET BY MOUTH DAILY AS NEEDED  0 Taking   • amiodarone (PACERONE) 200 MG tablet Take 200 mg by mouth Daily.      • amLODIPine (NORVASC) 10 MG tablet Take 10 mg by mouth Daily.   Taking   • apixaban (ELIQUIS) 5 MG tablet tablet Take 1 tablet by mouth Every 12 (Twelve) Hours. 60 tablet 3    • aspirin 325 MG tablet Take 1 tablet by mouth Daily. 30 tablet 3    • atorvastatin (LIPITOR) 40 MG tablet Take 40 mg by mouth Every Night.   Taking   • carvedilol (COREG) 6.25 MG tablet Take 6.25 mg by mouth 2 (Two) Times a Day.   Not Taking   • Ferrous Gluconate 325 (36 FE) MG tablet TAKE 1 TABLET EVERY DAY  1 Taking   • furosemide (LASIX) 40 MG tablet Take 40 mg by mouth Daily.   Taking   • lamoTRIgine (LaMICtal) 150 MG tablet Take 150 mg by mouth 2 (Two) Times a Day.   Taking   • lisinopril (PRINIVIL,ZESTRIL) 30 MG tablet Take 30 mg by mouth Daily.   Taking   • metFORMIN (GLUCOPHAGE) 500 MG tablet Take 500 mg by mouth 2 (Two) Times a Day.   Taking   • pantoprazole (PROTONIX) 40 MG EC tablet Take 40 mg by mouth Daily.   Taking   • potassium chloride (KLOR-CON) 20 MEQ CR tablet Take 20 mEq by mouth Daily.   Taking       Allergies:  Allergies   Allergen Reactions   • Latex Rash         Objective     Physical Exam:  Vital Signs: /82 (BP Location: Right arm, Patient Position: Lying)  Pulse 66  Temp 98.2 °F (36.8 °C) (Oral)   Resp 18  Ht 75\" (190.5 cm)  Wt 174 lb 3.2 oz (79 kg)  SpO2 95%  BMI 21.77 kg/m2  Physical Exam   Constitutional: He is oriented to person, place, and time. He appears well-developed and well-nourished. No distress.   HENT:   Head: Normocephalic.   Eyes: EOM are normal. Pupils are equal, round, and reactive to light.   Neck: Normal range of motion.   Cardiovascular: Normal rate, regular rhythm, " normal heart sounds and intact distal pulses.  Exam reveals no friction rub.    No murmur heard.  Pulmonary/Chest: Effort normal and breath sounds normal. No stridor. No respiratory distress. He has no wheezes. He has no rales.   Abdominal: Soft. Bowel sounds are normal. He exhibits no distension. There is tenderness. There is no guarding.   Pt has slight tenderness noted on LLQ during palpation    Musculoskeletal: Normal range of motion. He exhibits no edema.   Neurological: He is alert and oriented to person, place, and time.   Skin: Skin is warm and dry. He is not diaphoretic. There is pallor.   Psychiatric: He has a normal mood and affect. His behavior is normal. Judgment and thought content normal.   Vitals reviewed.          Results Reviewed:    Results from last 7 days  Lab Units 06/09/17  1236   WBC 10*3/mm3 6.18   HEMOGLOBIN g/dL 12.0*   PLATELETS 10*3/mm3 258       Results from last 7 days  Lab Units 06/09/17  1236   SODIUM mmol/L 140   POTASSIUM mmol/L 3.4*   TOTAL CO2 mmol/L 36.0*   CREATININE mg/dL 1.60*   GLUCOSE mg/dL 118*   CALCIUM mg/dL 10.1       I have personally reviewed and interpreted available lab data, radiology studies and ECG obtained at time of admission.     Assessment / Plan     Assessment/Problem List:   Hospital Problem List     Diabetes mellitus    Hyperlipidemia    History of PAF    Overview Signed 6/1/2017 12:30 PM by KAN Fuchs     · Chadsvasc=7  · Comadin Therapy         Dizziness    Hypokalemia            Plan:  DM  -- low dose s/s  -- AIC on 6/2 was 5.60      Dizziness/Vertigo  -- check orthostatic bp  -- ct head negative  -- ? Dehydration give fluids overnight  -- have ICD interrogated  -- PT/OT      Hypokalemia  -- replace   -- bmp in am     Elevated Creatinine  -- ? dehydration due to vomiting and decreased intake over last week  -- Elevated creatinine due to hypotensive episode.   -- ?  Medication induced on lasix and lisinopril  -- will hold lasix and lisinopril  and give fluids.     HLD  -- continue home med    HTN  -- will order coreg  -- will decrease norvasc dose and put hold parameters until we determine if patient is having orthostatic hypotension        Patient wants to switch from Eliquis to Coumadin. Pharmacy talked with Dr. Duran and they will dose coumadin and use Lovenox as a bridge.  DVT prophylaxis:teds/scuds/lovenox/coumadin  Code Status: full code   Admission Status: Patient will be admitted to (LEXOBS or LEXINPT)     MADELEINE Farnsworth 06/09/17 7:07 PM

## 2017-06-09 NOTE — PROGRESS NOTES
"Pt wife (Cheryl) called in regard to her  (Pancho) this morning 06/09/2017 9:59AM  He had recently been prescribed Eliquis by his PCP. She called our office needing to switch medications due to him feeling \"Sick, light headed, weak, vomiting, dizzy\"    I adv her that we didn't prescribe that particular medication to him, that he would need to call his PCP in regards of changing the medication.   Pt aware, and understood that she called the wrong place.  "

## 2017-06-09 NOTE — PROGRESS NOTES
"Pharmacy Consult  -  Warfarin    Pancho Bonner is a  77 y.o. male , pharmacy consulted for warfarin dosing. Patient is currently on Eliquis for history of DVT, started on last admission by cardiology. Patient would like to switch back to warfarin due to cost.     Patient admitted with complaints of neurological symptoms, CT negative, neuro has recommended to resume anticoagulation.     Height - 75\" (190.5 cm)  Weight - 174 lb 3.2 oz (79 kg)    Consulting Provider: - Dr. Zapata  Indication: - DVT/PE  Goal INR: -  2-3  Home Regimen:   - New start (Previously on 5mg daily prior to Eliquis)    Bridge Therapy: Enoxaparin 80mg SQ q12h    Drug-Drug Interactions with current regimen:  1. Amiodarone (Home medication, not yet restarted)  2. ASA (Home medication, not yet restarted)    Warfarin Dosing During Admission:    Date  6/9           INR  1.14           Dose  (7.5mg)                Labs:      Results from last 7 days     Lab Units 06/09/17  1253 06/09/17  1236 06/03/17  0852   INR  1.14 --  --    HEMOGLOBIN g/dL --  12.0* 10.8*   HEMATOCRIT % --  38.7* 36.2*   PLATELETS 10*3/mm3 --  258 200     Results from last 7 days     Lab Units 06/09/17  1236 06/03/17  0852 06/02/17  1957   SODIUM mmol/L 140 141 --    POTASSIUM mmol/L 3.4* 3.7 4.6   CHLORIDE mmol/L 99 107 --    TOTAL CO2 mmol/L 36.0* 30.0 --    BUN mg/dL 19 9 --    CREATININE mg/dL 1.60* 0.90 --    CALCIUM mg/dL 10.1 8.9 --    BILIRUBIN mg/dL 0.8 --  --    ALK PHOS U/L 65 --  --    ALT (SGPT) U/L 15 --  --    AST (SGOT) U/L 20 --  --    GLUCOSE mg/dL 118* 99 --      Current dietary intake: New admit, no dietary orders yet    Assessment/Plan:   1. Give one time dose of 7.5mg tonight and then resume previous home dose of 5mg daily. Patient received last dose of Eliquis this morning. Per chart review, patient has been on this dose for quite some time prior to starting Eliquis, INR ranged from subtherapeutic to supratherapeutic. Will discuss dose with " patient.   2. Monitor patient for signs/symptoms of bleeding/clotting and obtain daily INR.   3. Pharmacy will continue to follow and adjust as necessary.     Thanks,   Radha Adames, PharmD  6/9/2017  4:34 PM

## 2017-06-09 NOTE — ED PROVIDER NOTES
Subjective   HPI Comments: Pancho Bonner is a 77 y.o.male who presents to the ED with c/o neurological symptoms. Mr. Bonner was last seen here on 6/1/17 with neurological symptoms, he was admitted into [facility] and discharged home on 6/3/17 to follow up with stroke clinic, cardiology and PCP in one month. He has hx of CVA, CAD, CHF, MI, HTN.     The patient reports last night he had a sudden onset of visual disturbances, confusion, nausea, vomiting and light-headedness. He woke up this morning with continued symptoms of light-headedness and imbalance on his feet, prompting presentation into the ED for evaluation. Per his wife, he experienced near-syncope shortly prior to arrival into ED. The patients last time known well was sometime yesterday evening, pts wife is unable to specify time. No other acute complaints at this time.    Patient is a 77 y.o. male presenting with neurologic complaint.   History provided by:  Patient and spouse  Neurologic Problem   The patient's primary symptoms include a loss of balance, near-syncope and a visual change. The patient's pertinent negatives include no focal weakness. This is a new problem. The current episode started yesterday. The neurological problem developed suddenly. Last Known Well Instant: last night.  The problem is unchanged. There was no focality noted. Associated symptoms include confusion, dizziness, light-headedness, nausea and vomiting. His past medical history is significant for a CVA.       Review of Systems   Eyes: Positive for visual disturbance.   Cardiovascular: Positive for near-syncope.   Gastrointestinal: Positive for nausea and vomiting.   Neurological: Positive for dizziness, light-headedness and loss of balance. Negative for focal weakness.        Near-syncope. Imbalance.   Psychiatric/Behavioral: Positive for confusion.   All other systems reviewed and are negative.      Past Medical History:   Diagnosis Date   • Anxiety    • Arnold-Chiari  malformation, type I     followed by Dr. Martinez, but last note available was 2014   • BPH (benign prostatic hyperplasia)    • Chronic Pleural effusion on right    • Complex partial seizure     followed by Dr. Martinez, but last note available was 2014   • Congestive heart failure     follows with Dr. Pompa at [facility], EF 30%   • Coronary artery disease     on ASA/plavix   • CVA (cerebral vascular accident)     apical thrombus on chronic coumadin   • Diabetes mellitus    • ED (erectile dysfunction)    • GERD (gastroesophageal reflux disease)    • Hyperlipidemia    • Hypertension    • Ischemic cardiomyopathy    • Myocardial infarction    • Non-sustained ventricular tachycardia    1:29 PM  From Dr. Ray Zapata Consult note on 6/1/17:    PLAN:   · Interrogate ICD/ Echocardiogram  · Consider Eliquis when ok with Neurology  · Consider cardiac medications when taking PO, including Amiodarone.   This is Dr. Ray Zapata I personally reviewed all the patient's records and discussed the case with the patient his son and the physician extender Jhonny Dale. I reviewed a heart catheter report from February which showed a patent LIMA to the LAD with lesions beyond the graft insertion with moderate disease of the vessels. Cording to the patient he has had a stroke before and that was about 2007. 2005 at 2006 he had a DVT and PE was told to take warfarin indefinitely.  Allergies   Allergen Reactions   • Latex Rash       Past Surgical History:   Procedure Laterality Date   • CARDIAC CATHETERIZATION  02/2016    [facility], 3 vessel disease, patent LIMA to LAD bypass graft    • CARDIAC CATHETERIZATION  05/25/2017    [facility], severe 3 vessel disease, patent LIMA to LAD bypass graft, recommend medical management    • CARDIAC DEFIBRILLATOR PLACEMENT  08/2009    w/ PPM Medtronic (DDD)   • COLONOSCOPY W/ BIOPSIES     • CORONARY ANGIOPLASTY WITH STENT PLACEMENT     • CORONARY ARTERY BYPASS GRAFT  2000    [facility]   • INSERT / REPLACE / REMOVE PACEMAKER   05/26/2017    Medtronic generator change, GENARO Ortiz    • INTERVENTIONAL RADIOLOGY PROCEDURE Bilateral 6/1/2017    Procedure: Carotid Cerebral Angiogram;  Surgeon: Wil Rodrigez MD;  Location: [facility] INVASIVE LOCATION;  Service:        Family History   Problem Relation Age of Onset   • Diabetes Mother    • Hypertension Mother    • Heart disease Mother    • Heart disease Father    • Stroke Father    • Heart disease Sister        Social History     Social History   • Marital status:      Spouse name: N/A   • Number of children: N/A   • Years of education: N/A     Occupational History   •  Retired     Social History Main Topics   • Smoking status: Never Smoker   • Smokeless tobacco: Never Used   • Alcohol use No   • Drug use: No   • Sexual activity: Defer      Comment:      Other Topics Concern   • None     Social History Narrative   • None         Objective   Physical Exam   Constitutional: He is oriented to person, place, and time. No distress.   Alert and oriented, and in no acute distress.   HENT:   Head: Normocephalic and atraumatic.   Mouth/Throat: Oropharynx is clear and moist. No oropharyngeal exudate.   Eyes: Conjunctivae are normal. No scleral icterus.   Neck: Normal range of motion. Neck supple.   No adenopathy. No bruits. No JVD.   Cardiovascular: Normal rate, regular rhythm and normal heart sounds.    Regular, distant. No murmur, rubs, gallops, or heaves.   Pulmonary/Chest: Effort normal and breath sounds normal. No respiratory distress.   Breath sounds normal.    Abdominal: Soft. Bowel sounds are normal. There is no tenderness.   Abdomen normal.   Musculoskeletal: Normal range of motion. He exhibits no edema.   Extremities normal.   Neurological: He is alert and oriented to person, place, and time.   Mild global weakness. No focality. He is able to stand without assistance, but shortly after his legs became weak and he landed down on the bed on his buttocks, and required help  to stand up and walk. Gait was not ataxic. 1+ knee jerks.   Skin: Skin is warm and dry. He is not diaphoretic. No erythema.   Psychiatric: He has a normal mood and affect. His behavior is normal.   Nursing note and vitals reviewed.      Critical Care  Performed by: ARTURO ARANDA  Authorized by: ARTURO ARANDA   Total critical care time: 40 minutes  Critical care time was exclusive of separately billable procedures and treating other patients and teaching time.  Critical care was necessary to treat or prevent imminent or life-threatening deterioration of the following conditions: cardiac failure and CNS failure or compromise.  Critical care was time spent personally by me on the following activities: discussions with consultants, obtaining history from patient or surrogate, evaluation of patient's response to treatment, examination of patient, ordering and performing treatments and interventions, ordering and review of radiographic studies, ordering and review of laboratory studies, pulse oximetry, re-evaluation of patient's condition, development of treatment plan with patient or surrogate and review of old charts.               ED Course  ED Course   Comment By Time   I spoke personally with Dr. Zapata, cardiology, regarding the patient Arturo Aranda MD 06/09 4290     Recent Results (from the past 24 hour(s))   Comprehensive Metabolic Panel    Collection Time: 06/09/17 12:36 PM   Result Value Ref Range    Glucose 118 (H) 70 - 100 mg/dL    BUN 19 9 - 23 mg/dL    Creatinine 1.60 (H) 0.60 - 1.30 mg/dL    Sodium 140 132 - 146 mmol/L    Potassium 3.4 (L) 3.5 - 5.5 mmol/L    Chloride 99 99 - 109 mmol/L    CO2 36.0 (H) 20.0 - 31.0 mmol/L    Calcium 10.1 8.7 - 10.4 mg/dL    Total Protein 7.6 5.7 - 8.2 g/dL    Albumin 4.50 3.20 - 4.80 g/dL    ALT (SGPT) 15 7 - 40 U/L    AST (SGOT) 20 0 - 33 U/L    Alkaline Phosphatase 65 25 - 100 U/L    Total Bilirubin 0.8 0.3 - 1.2 mg/dL    eGFR Non  Amer 42 (L) >60  mL/min/1.73    Globulin 3.1 gm/dL    A/G Ratio 1.5 1.5 - 2.5 g/dL    BUN/Creatinine Ratio 11.9 7.0 - 25.0    Anion Gap 5.0 3.0 - 11.0 mmol/L   Urinalysis With / Culture If Indicated    Collection Time: 06/09/17 12:36 PM   Result Value Ref Range    Color, UA Yellow Yellow, Straw    Appearance, UA Clear Clear    pH, UA 5.5 5.0 - 8.0    Specific Gravity, UA 1.010 1.001 - 1.030    Glucose, UA Negative Negative    Ketones, UA Negative Negative    Bilirubin, UA Negative Negative    Blood, UA Negative Negative    Protein, UA Negative Negative    Leuk Esterase, UA Negative Negative    Nitrite, UA Negative Negative    Urobilinogen, UA 0.2 E.U./dL 0.2 - 1.0 E.U./dL   TSH    Collection Time: 06/09/17 12:36 PM   Result Value Ref Range    TSH 2.141 0.350 - 5.350 mIU/mL   CBC Auto Differential    Collection Time: 06/09/17 12:36 PM   Result Value Ref Range    WBC 6.18 3.50 - 10.80 10*3/mm3    RBC 4.77 4.20 - 5.76 10*6/mm3    Hemoglobin 12.0 (L) 13.1 - 17.5 g/dL    Hematocrit 38.7 (L) 38.9 - 50.9 %    MCV 81.1 80.0 - 99.0 fL    MCH 25.2 (L) 27.0 - 31.0 pg    MCHC 31.0 (L) 32.0 - 36.0 g/dL    RDW 15.5 (H) 11.3 - 14.5 %    RDW-SD 45.8 37.0 - 54.0 fl    MPV 10.3 6.0 - 12.0 fL    Platelets 258 150 - 450 10*3/mm3    Neutrophil % 67.7 41.0 - 71.0 %    Lymphocyte % 18.8 (L) 24.0 - 44.0 %    Monocyte % 10.7 0.0 - 12.0 %    Eosinophil % 2.3 0.0 - 3.0 %    Basophil % 0.2 0.0 - 1.0 %    Immature Grans % 0.3 0.0 - 0.6 %    Neutrophils, Absolute 4.19 1.50 - 8.30 10*3/mm3    Lymphocytes, Absolute 1.16 0.60 - 4.80 10*3/mm3    Monocytes, Absolute 0.66 0.00 - 1.00 10*3/mm3    Eosinophils, Absolute 0.14 0.10 - 0.30 10*3/mm3    Basophils, Absolute 0.01 0.00 - 0.20 10*3/mm3    Immature Grans, Absolute 0.02 0.00 - 0.03 10*3/mm3   Scan Slide    Collection Time: 06/09/17 12:36 PM   Result Value Ref Range    RBC Morphology Normal Normal    WBC Morphology Normal Normal    Platelet Morphology Normal Normal   CK    Collection Time: 06/09/17 12:36 PM   Result  "Value Ref Range    Creatine Kinase 102 26 - 174 U/L   Magnesium    Collection Time: 06/09/17 12:36 PM   Result Value Ref Range    Magnesium 1.9 1.3 - 2.7 mg/dL   POC Troponin, Rapid    Collection Time: 06/09/17 12:41 PM   Result Value Ref Range    Troponin I 1.24 (C) 0.00 - 0.07 ng/mL   Protime-INR    Collection Time: 06/09/17 12:53 PM   Result Value Ref Range    Protime 12.5 (H) 9.6 - 11.5 Seconds    INR 1.14    POC Troponin, Rapid    Collection Time: 06/09/17  3:08 PM   Result Value Ref Range    Troponin I 1.27 (C) 0.00 - 0.07 ng/mL     Note: In addition to lab results from this visit, the labs listed above may include labs taken at another facility or during a different encounter within the last 24 hours. Please correlate lab times with ED admission and discharge times for further clarification of the services performed during this visit.    XR Chest 1 View   Preliminary Result   Compared to one week ago, 06/02/2017, there has been no   change and there is no active disease.       D:  06/09/2017   E:  06/09/2017                  CT Head Without Contrast   Preliminary Result   1.  Negative CT data set of the brain for acute focal abnormality.   Evolving infarct, hemorrhage or edema is not identified.   2.  Widespread low attenuation microangiopathy is identified throughout   the white matter and casie, not changed from 06/02/2017.   3.  Data sets were complete at 12:20 PM and the report rendered   immediately to the emergency physician in attendance.       D:  06/09/2017   E:  06/09/2017                    Vitals:    06/09/17 1432 06/09/17 1437 06/09/17 1455 06/09/17 1500   BP: 134/70 134/70 143/73 143/82   BP Location:   Left arm Right arm   Patient Position:  Standing Lying Lying   Pulse: 65 65 61 66   Resp:   18    Temp:   98.2 °F (36.8 °C)    TempSrc:   Oral    SpO2:       Weight:   174 lb 3.2 oz (79 kg)    Height:   75\" (190.5 cm)      Medications   sodium chloride 0.9 % infusion (125 mL/hr Intravenous New Bag " "6/9/17 1316)   potassium chloride (MICRO-K) CR capsule 20 mEq (20 mEq Oral Given 6/9/17 1426)     ECG/EMG Results (last 24 hours)     Procedure Component Value Units Date/Time    ECG 12 Lead [[id number]] Collected:  06/09/17 1214     Updated:  06/09/17 1219                  MDM  Number of Diagnoses or Management Options  Acute renal failure, unspecified acute renal failure type:   Dizziness:   Elevated troponin:   Hypokalemia:   Ischemic cardiomyopathy:   Nausea and vomiting, intractability of vomiting not specified, unspecified vomiting type:   Poor tolerance for ambulation:   Diagnosis management comments:   I reviewed all available studies at the bedside and patient and his family.    His head CT shows chronic changes, but nothing that can be considered acute.  The stroke coordinator also saw the patient, and we agreed that he does not have focality requiring perfusion..    I reviewed this personally with Dr. Pascual.    Normally, I would proceed with MRI but he cannot have this as he has a pacemaker in place.    His EKG shows a paced rhythm but his troponin is 1.24.    When he was discharged a week ago his troponin was 1 and he has a history of ischemic cardiomyopathy.  He has no chest pain to suggest he has ongoing angina, this may represent a \"troponin leak\".  He is already anticoagulated, however I think he needs cardiology consultation regarding this. I spoke with Margy, cardiology coordinator, who will send the cardiologist down to see the patient.    Patient also has acute renal failure that may be due to dehydration, though he reports his appetite has been fairly good.    The patient is quite weak and really does not have an ataxic gait, but has problems walking and he has not had physical therapy since his discharge.    He also feels that Eliquis will be too expensive for him and would like to transition back to Coumadin.    His potassium was also slightly low so I will repeat this.      At this point I " think he needs to be admitted for further evaluation, and perhaps to begin physical therapy to improve his gait, as he is at a high risk of falling and bleeding due to his chronic anticoagulation.    He also had dizziness last night, but not true vertigo.  This was associated with nausea and vomiting, and he may have a labyrinthine cause of this.  Unfortunately, he was not eligible for an MRI of his brain.    I've paged a call to Dr. Shine, on-call hospital medicine, to admit the patient.    All are agreeable with the plan.       Amount and/or Complexity of Data Reviewed  Clinical lab tests: reviewed  Tests in the radiology section of CPT®: reviewed  Tests in the medicine section of CPT®: reviewed  Decide to obtain previous medical records or to obtain history from someone other than the patient: yes    Critical Care  Total time providing critical care: 30-74 minutes      Final diagnoses:   Dizziness   Nausea and vomiting, intractability of vomiting not specified, unspecified vomiting type   Acute renal failure, unspecified acute renal failure type   Hypokalemia   Ischemic cardiomyopathy   Elevated troponin   Poor tolerance for ambulation     EMR Dragon/Transcription disclaimer:   Much of this encounter note is an electronic transcription/translation of spoken language to printed text. The electronic translation of spoken language may permit erroneous, or at times, nonsensical words or phrases to be inadvertently transcribed; Although I have reviewed the note for such errors, some may still exist.     Documentation assistance provided by fatmata Johnson.  Information recorded by the fatmata was done at my direction and has been verified and validated by me.     Mackenzie Johnson  06/09/17 1322       Mackenzie Johnson  06/09/17 1331       Mackenzie Johnson  06/09/17 1430       Arturo Aranda MD  06/09/17 1433       Arturo Aranda MD  06/09/17 [id number]

## 2017-06-10 PROBLEM — N17.9 ACUTE KIDNEY INJURY (HCC): Status: ACTIVE | Noted: 2017-06-10

## 2017-06-10 PROBLEM — R77.8 ELEVATED TROPONIN I LEVEL: Status: ACTIVE | Noted: 2017-06-10

## 2017-06-10 PROBLEM — H81.10 VERTIGO, BENIGN PAROXYSMAL: Status: ACTIVE | Noted: 2017-06-10

## 2017-06-10 LAB
ANION GAP SERPL CALCULATED.3IONS-SCNC: 8 MMOL/L (ref 3–11)
BASOPHILS # BLD AUTO: 0.02 10*3/MM3 (ref 0–0.2)
BASOPHILS NFR BLD AUTO: 0.4 % (ref 0–1)
BNP SERPL-MCNC: 159 PG/ML (ref 0–100)
BUN BLD-MCNC: 14 MG/DL (ref 9–23)
BUN/CREAT SERPL: 11.7 (ref 7–25)
CALCIUM SPEC-SCNC: 9 MG/DL (ref 8.7–10.4)
CHLORIDE SERPL-SCNC: 102 MMOL/L (ref 99–109)
CO2 SERPL-SCNC: 31 MMOL/L (ref 20–31)
CREAT BLD-MCNC: 1.2 MG/DL (ref 0.6–1.3)
DEPRECATED RDW RBC AUTO: 47.9 FL (ref 37–54)
EOSINOPHIL # BLD AUTO: 0.21 10*3/MM3 (ref 0.1–0.3)
EOSINOPHIL NFR BLD AUTO: 4 % (ref 0–3)
ERYTHROCYTE [DISTWIDTH] IN BLOOD BY AUTOMATED COUNT: 15.8 % (ref 11.3–14.5)
GFR SERPL CREATININE-BSD FRML MDRD: 59 ML/MIN/1.73
GLUCOSE BLD-MCNC: 78 MG/DL (ref 70–100)
GLUCOSE BLDC GLUCOMTR-MCNC: 122 MG/DL (ref 70–130)
GLUCOSE BLDC GLUCOMTR-MCNC: 146 MG/DL (ref 70–130)
GLUCOSE BLDC GLUCOMTR-MCNC: 83 MG/DL (ref 70–130)
GLUCOSE BLDC GLUCOMTR-MCNC: 91 MG/DL (ref 70–130)
HCT VFR BLD AUTO: 34.4 % (ref 38.9–50.9)
HGB BLD-MCNC: 10.6 G/DL (ref 13.1–17.5)
IMM GRANULOCYTES # BLD: 0.02 10*3/MM3 (ref 0–0.03)
IMM GRANULOCYTES NFR BLD: 0.4 % (ref 0–0.6)
INR PPP: 1.12
LYMPHOCYTES # BLD AUTO: 1.22 10*3/MM3 (ref 0.6–4.8)
LYMPHOCYTES NFR BLD AUTO: 23.1 % (ref 24–44)
MCH RBC QN AUTO: 25.5 PG (ref 27–31)
MCHC RBC AUTO-ENTMCNC: 30.8 G/DL (ref 32–36)
MCV RBC AUTO: 82.7 FL (ref 80–99)
MONOCYTES # BLD AUTO: 0.67 10*3/MM3 (ref 0–1)
MONOCYTES NFR BLD AUTO: 12.7 % (ref 0–12)
NEUTROPHILS # BLD AUTO: 3.15 10*3/MM3 (ref 1.5–8.3)
NEUTROPHILS NFR BLD AUTO: 59.4 % (ref 41–71)
PLATELET # BLD AUTO: 222 10*3/MM3 (ref 150–450)
PMV BLD AUTO: 10.5 FL (ref 6–12)
POTASSIUM BLD-SCNC: 3 MMOL/L (ref 3.5–5.5)
PROTHROMBIN TIME: 12.2 SECONDS (ref 9.6–11.5)
RBC # BLD AUTO: 4.16 10*6/MM3 (ref 4.2–5.76)
SODIUM BLD-SCNC: 141 MMOL/L (ref 132–146)
WBC NRBC COR # BLD: 5.29 10*3/MM3 (ref 3.5–10.8)

## 2017-06-10 PROCEDURE — 97162 PT EVAL MOD COMPLEX 30 MIN: CPT

## 2017-06-10 PROCEDURE — 99232 SBSQ HOSP IP/OBS MODERATE 35: CPT | Performed by: HOSPITALIST

## 2017-06-10 PROCEDURE — 97165 OT EVAL LOW COMPLEX 30 MIN: CPT

## 2017-06-10 PROCEDURE — G8979 MOBILITY GOAL STATUS: HCPCS

## 2017-06-10 PROCEDURE — 82962 GLUCOSE BLOOD TEST: CPT

## 2017-06-10 PROCEDURE — G0378 HOSPITAL OBSERVATION PER HR: HCPCS

## 2017-06-10 PROCEDURE — 96372 THER/PROPH/DIAG INJ SC/IM: CPT

## 2017-06-10 PROCEDURE — 83880 ASSAY OF NATRIURETIC PEPTIDE: CPT | Performed by: INTERNAL MEDICINE

## 2017-06-10 PROCEDURE — 25010000002 ENOXAPARIN PER 10 MG

## 2017-06-10 PROCEDURE — 99213 OFFICE O/P EST LOW 20 MIN: CPT | Performed by: INTERNAL MEDICINE

## 2017-06-10 PROCEDURE — 85025 COMPLETE CBC W/AUTO DIFF WBC: CPT | Performed by: NURSE PRACTITIONER

## 2017-06-10 PROCEDURE — G8988 SELF CARE GOAL STATUS: HCPCS

## 2017-06-10 PROCEDURE — G8987 SELF CARE CURRENT STATUS: HCPCS

## 2017-06-10 PROCEDURE — G8978 MOBILITY CURRENT STATUS: HCPCS

## 2017-06-10 PROCEDURE — 85610 PROTHROMBIN TIME: CPT

## 2017-06-10 PROCEDURE — G8989 SELF CARE D/C STATUS: HCPCS

## 2017-06-10 PROCEDURE — 80048 BASIC METABOLIC PNL TOTAL CA: CPT | Performed by: INTERNAL MEDICINE

## 2017-06-10 RX ORDER — MECLIZINE HCL 12.5 MG/1
12.5 TABLET ORAL 3 TIMES DAILY PRN
Status: DISCONTINUED | OUTPATIENT
Start: 2017-06-10 | End: 2017-06-11 | Stop reason: HOSPADM

## 2017-06-10 RX ORDER — POTASSIUM CHLORIDE 7.45 MG/ML
10 INJECTION INTRAVENOUS
Status: DISCONTINUED | OUTPATIENT
Start: 2017-06-10 | End: 2017-06-11 | Stop reason: HOSPADM

## 2017-06-10 RX ORDER — POTASSIUM CHLORIDE 1.5 G/1.77G
40 POWDER, FOR SOLUTION ORAL AS NEEDED
Status: DISCONTINUED | OUTPATIENT
Start: 2017-06-10 | End: 2017-06-11 | Stop reason: HOSPADM

## 2017-06-10 RX ORDER — POTASSIUM CHLORIDE 750 MG/1
40 CAPSULE, EXTENDED RELEASE ORAL ONCE
Status: COMPLETED | OUTPATIENT
Start: 2017-06-10 | End: 2017-06-10

## 2017-06-10 RX ORDER — POTASSIUM CHLORIDE 750 MG/1
40 CAPSULE, EXTENDED RELEASE ORAL AS NEEDED
Status: DISCONTINUED | OUTPATIENT
Start: 2017-06-10 | End: 2017-06-11 | Stop reason: HOSPADM

## 2017-06-10 RX ORDER — POLYETHYLENE GLYCOL 3350 17 G/17G
17 POWDER, FOR SOLUTION ORAL DAILY
Status: DISCONTINUED | OUTPATIENT
Start: 2017-06-10 | End: 2017-06-11 | Stop reason: HOSPADM

## 2017-06-10 RX ADMIN — AMLODIPINE BESYLATE 5 MG: 5 TABLET ORAL at 09:25

## 2017-06-10 RX ADMIN — ASPIRIN 325 MG ORAL TABLET 325 MG: 325 PILL ORAL at 09:25

## 2017-06-10 RX ADMIN — Medication 325 MG: at 09:30

## 2017-06-10 RX ADMIN — DOCUSATE SODIUM 100 MG: 100 CAPSULE, LIQUID FILLED ORAL at 09:25

## 2017-06-10 RX ADMIN — ENOXAPARIN SODIUM 80 MG: 80 INJECTION SUBCUTANEOUS at 17:06

## 2017-06-10 RX ADMIN — LAMOTRIGINE 150 MG: 100 TABLET ORAL at 09:24

## 2017-06-10 RX ADMIN — AMIODARONE HYDROCHLORIDE 200 MG: 200 TABLET ORAL at 09:24

## 2017-06-10 RX ADMIN — SODIUM CHLORIDE 125 ML/HR: 9 INJECTION, SOLUTION INTRAVENOUS at 06:45

## 2017-06-10 RX ADMIN — WARFARIN SODIUM 5 MG: 5 TABLET ORAL at 17:08

## 2017-06-10 RX ADMIN — POTASSIUM CHLORIDE 40 MEQ: 750 CAPSULE, EXTENDED RELEASE ORAL at 20:52

## 2017-06-10 RX ADMIN — CARVEDILOL 6.25 MG: 6.25 TABLET, FILM COATED ORAL at 17:08

## 2017-06-10 RX ADMIN — CARVEDILOL 6.25 MG: 6.25 TABLET, FILM COATED ORAL at 09:24

## 2017-06-10 RX ADMIN — ATORVASTATIN CALCIUM 40 MG: 40 TABLET, FILM COATED ORAL at 20:53

## 2017-06-10 RX ADMIN — POTASSIUM CHLORIDE 40 MEQ: 750 CAPSULE, EXTENDED RELEASE ORAL at 17:07

## 2017-06-10 RX ADMIN — POTASSIUM CHLORIDE 40 MEQ: 750 CAPSULE, EXTENDED RELEASE ORAL at 14:31

## 2017-06-10 RX ADMIN — PANTOPRAZOLE SODIUM 40 MG: 40 TABLET, DELAYED RELEASE ORAL at 06:43

## 2017-06-10 RX ADMIN — DOCUSATE SODIUM 100 MG: 100 CAPSULE, LIQUID FILLED ORAL at 17:08

## 2017-06-10 RX ADMIN — LAMOTRIGINE 150 MG: 100 TABLET ORAL at 20:52

## 2017-06-10 RX ADMIN — SODIUM CHLORIDE 125 ML/HR: 9 INJECTION, SOLUTION INTRAVENOUS at 12:49

## 2017-06-10 RX ADMIN — ENOXAPARIN SODIUM 80 MG: 80 INJECTION SUBCUTANEOUS at 06:43

## 2017-06-10 NOTE — PLAN OF CARE
Problem: Patient Care Overview (Adult)  Goal: Plan of Care Review  Outcome: Ongoing (interventions implemented as appropriate)    06/10/17 1308   Coping/Psychosocial Response Interventions   Plan Of Care Reviewed With patient   Outcome Evaluation   Outcome Summary/Follow up Plan Pt presents at fxl baseline/independent with ADLs; PT addressing fxl mobility; pt/spouse completed education re supervision with bathing initially, home safety to reduce risk of falls and verbalized good understanding. No DME/AE needs identified. No further skilled OT services indicated at this time. Recommend home with assist.         Problem: Inpatient Occupational Therapy  Goal: Patient Education Goal LTG- OT  Outcome: Outcome(s) achieved Date Met:  06/10/17    06/10/17 1308   Patient Education OT LTG   Patient Education OT LTG, Date Established 06/10/17   Patient Education OT LTG, Time to Achieve 1 day   Patient Education OT LTG, Education Type home safety   Patient Education OT LTG, Education Understanding verbalizes understanding   Patient Education OT LTG Outcome goal met

## 2017-06-10 NOTE — PROGRESS NOTES
[facility] Medicine Services  INPATIENT PROGRESS NOTE    Date of Admission: 6/9/2017  Length of Stay: 1  Primary Care Physician: Shar Obregon MD    Subjective   CC: Follow-up vertigo and acute kidney injury  HPI:  Patient is resting in bed with spouse at bedside when seen.  He reports feeling much better today.  He denies active vertigo, nausea, vomiting, or tinnitus.  Headache is resolved as well.  He reports some mild constipation.  When he sits up or moves his head he still notes a mild sensation of vertigo.  No previous history of vertigo.  Has not been up or walking.  Denies urinary symptoms or difficulty urinating.  No active neurologic deficits outside of mild word finding difficulties.      Review Of Systems:   Review of Systems   Constitutional: Negative for chills and fever.   Respiratory: Negative for cough and shortness of breath.    Cardiovascular: Negative for chest pain and palpitations.   Gastrointestinal: Positive for constipation. Negative for abdominal pain, nausea and vomiting.     Objective      Temp:  [97.8 °F (36.6 °C)-98.3 °F (36.8 °C)] 98.2 °F (36.8 °C)  Heart Rate:  [59-84] 60  Resp:  [18] 18  BP: (116-149)/(65-85) 123/65  Physical Exam  Constitutional: no acute distress, awake, alert  HENT: EOMI, sclerae anicteric, no nystagmus  Respiratory: Clear to auscultation bilaterally, nonlabored respirations   Cardiovascular: RRR, no murmurs, rubs, or gallops, palpable pedal pulses bilaterally  Gastrointestinal: Positive bowel sounds, soft, nontender, nondistended  Musculoskeletal: No bilateral ankle edema  Psychiatric: oriented x 3, appropriate affect, cooperative  Neurologic: Strength symmetric in all extremities, no facial asymmetry, subtle word finding impairment      Results Review:    I have reviewed the labs, radiology results and diagnostic studies.      Results from last 7 days  Lab Units 06/10/17  0518   WBC 10*3/mm3 5.29   HEMOGLOBIN g/dL 10.6*    PLATELETS 10*3/mm3 222       Results from last 7 days  Lab Units 06/10/17  0518   SODIUM mmol/L 141   POTASSIUM mmol/L 3.0*   CHLORIDE mmol/L 102   TOTAL CO2 mmol/L 31.0   BUN mg/dL 14   CREATININE mg/dL 1.20   GLUCOSE mg/dL 78   CALCIUM mg/dL 9.0       Culture Data: Cultures:       Radiology Data: Chest x-ray and CT head from admission personally reviewed, agree with official interpretations of no acute abnormalities    I have reviewed the medications.    Assessment/Plan   Mr. Bonner is a 77 year-old M with a past hx of CAD s/p CABG, non-sustained VTach with AICD, recent CVA for which he underwent mechanical thrombectomy and TPA 6/1/2017, T2DM, Seizure disorder, BPH, HTN, HLP, and Chronic LV Systolic/Diastolic HF with last LVEF 50% who presented to the [facility] ER on 6/9 with vertigo and gait instability.   Patient reported that, on 6/8, he had a 4-5 hour episode of vertigo that started while he was seated and without clear precipitant.  This was associated with nausea, vomiting, inability to stand, tinnitus, and headache.  Episode was self-limited and has not recurred.  Evaluation in the ER disclosed acute kidney injury, prompting admission:     Problem List  Hospital Problem List     * (Principal)Acute kidney injury, likely prerenal from dehydration    Overview Signed 6/10/2017  3:20 PM by Nicolas Dias MD     - Baseline Cr appears to be 1.0 - 1.2         Vertigo, benign paroxysmal    Overview Signed 6/10/2017  3:21 PM by Nicolas Dias MD     - Suspect BPPV          Elevated troponin I level, no evidence of ACS    Arnold-Chiari malformation, type I    Complex partial epilepsy    Diabetes mellitus    History of Ischemic cardiomyopathy. EF 30% in past, EF 50% on echo 6/1/17    Overview Addendum 6/1/2017 12:11 PM by KAN Fuchs     · EF 30%, has AICD PPM (DDD), Medtr[employer], follows with Dr. Pompa at [facility]  · Medtronic Gen change 5/26/17 Dr. Ho [facility]  · INR sub therapeutic at time of Gen. Change.          Hyperlipidemia    Hypertension    H/O NSVT status post PPM/ICD    Overview Addendum 6/1/2017 12:23 PM by KAN Fuchs     · ICD Shock 5/25/17 with report of slow  bpm per Dr. Ho/ Dharmeshrongabriela          Coronary artery disease with CABG in 2000, and 2016. Stent placed February 2017    Overview Addendum 6/1/2017 12:27 PM by KAN Fuchs     · CABG 2000 LIMA to LAD  · Redo CABG 2016 Hattie to PDA, SVG to D1, and SVG to OM1 and OM2  · Recent NONSTEMI at [facility] Dr. Josiah Bedolla 2/16 revealing Patent LIMA to diffusely diseased LAD and Three vessel disease treated with Medical therapy recommended.   · Recurrent VT: [facility] Dr. Magallanes 2/17 Stent placed in SVG to OM1 and OM2/ Amiodarone therapy  · Recurrent VT: [facility] admit with [facility]: Medical therapy 5/25/17  · Echocardiogram EF 45% 5/17 [facility]         History of PAF    Overview Signed 6/1/2017 12:30 PM by KAN Fuchs     · Chadsvasc=7  · Comadin Therapy         Dizziness    Hypokalemia        Assessment/Plan:  - If patient continues to improve, anticipate discharge on 6/11  -Patient appears to have had benign paroxysmal positional vertigo that was self-limited.  Will order as needed meclizine as well as PT evaluation.  -Patient's acute kidney injury has improved overnight, will continue IV fluids but decrease rate and repeat BMP in a.m., monitor urinary output  -Bowel regimen for constipation  -I counseled patient and spouse extensively on etiology of vertigo and treatment  -Appreciate cardiology assistance, patient is without chest pain and without AICD firing.  His troponin is stable from recent discharge and is likely a residual elevation in the setting of a recent stroke.  No plan for inpatient ischemic evaluation.  -Eliquis discontinued at admission, patient started on Lovenox bridge and warfarin at his preference for chronic anticoagulation.  Indications for chronic anticoagulation include paroxysmal atrial fibrillation, and previous stroke with  an apical cardiac thrombus  --Pharmacy dosing coumadin, pt will need outpt PT/INR followup and will need at least 5 days of lovenox bridge or as along as needed until INR therapeutic   -Repeat labs in a.m.  -Monitor blood pressure, continue current medications  -Low-dose sliding scale insulin, monitor blood sugars, stable so far   --Replace K    DVT prophylaxis: full anticoagulation  Discharge Planning: I expect patient to be discharged to home in 1 days.    Nicolas iDas MD   06/10/17   9:29 AM

## 2017-06-10 NOTE — PROGRESS NOTES
[facility]  Progress Note       LOS: 1 day   Patient Care Team:  Shar Obregon MD as PCP - General    Chief Complaint:  Follow up elevated troponin    Subjective    No CP or SOB. Dizziness better. No palpitations. No nausea or vomiting. No new complaints today.    Interval History:         Review of Systems:   Pertinent positives in HPI, all others reviewed and negative.      Objective       Current Facility-Administered Medications:   •  acetaminophen (TYLENOL) tablet 650 mg, 650 mg, Oral, Q4H PRN, MADELEINE Farnsworth  •  amiodarone (PACERONE) tablet 200 mg, 200 mg, Oral, Daily, Cande Larson APRN  •  amLODIPine (NORVASC) tablet 5 mg, 5 mg, Oral, Daily, MADELEINE Farnsworth  •  aspirin tablet 325 mg, 325 mg, Oral, Daily, MADELEINE Farnsworth  •  atorvastatin (LIPITOR) tablet 40 mg, 40 mg, Oral, Nightly, MADELEINE Farnsworth, 40 mg at 06/09/17 2030  •  bisacodyl (DULCOLAX) suppository 10 mg, 10 mg, Rectal, Daily PRN, MADELEINE Farnsworth  •  carvedilol (COREG) tablet 6.25 mg, 6.25 mg, Oral, BID, Cande Larson APRN, 6.25 mg at 06/09/17 1748  •  dextrose (D50W) solution 25 g, 25 g, Intravenous, Q15 Min PRN, Cande Larson APRN  •  dextrose (GLUTOSE) oral gel 15 g, 15 g, Oral, Q15 Min PRN, MADELEINE Farnsworth  •  docusate sodium (COLACE) capsule 100 mg, 100 mg, Oral, BID, Cande Larson APRN, 100 mg at 06/09/17 1748  •  enoxaparin (LOVENOX) syringe 80 mg, 80 mg, Subcutaneous, Q12H, Radha Adames, H, 80 mg at 06/10/17 0643  •  ferrous sulfate tablet 325 mg, 325 mg, Oral, Daily With Breakfast, MADELEINE Farnsworth  •  glucagon (GLUCAGEN) injection 1 mg, 1 mg, Subcutaneous, Q15 Min PRN, Cande Larson APRN  •  insulin lispro (humaLOG) injection 0-7 Units, 0-7 Units, Subcutaneous, 4x Daily With Meals & Nightly, MADELEINE Farnsworth  •  lamoTRIgine (LaMICtal) tablet 150 mg, 150 mg, Oral, Q12H, MADELEINE Farnsworth,  "150 mg at 06/09/17 2030  •  pantoprazole (PROTONIX) EC tablet 40 mg, 40 mg, Oral, Q AM, MADELEINE Farnsworth, 40 mg at 06/10/17 0643  •  Pharmacy to Dose enoxaparin (LOVENOX), , Does not apply, Continuous PRN, Zak Zapata MD  •  Pharmacy to dose warfarin, , Does not apply, Continuous PRN, Zak Zapata MD  •  sodium chloride 0.9 % flush 1-10 mL, 1-10 mL, Intravenous, PRN, MADELEINE Farnsworth  •  sodium chloride 0.9 % infusion, 125 mL/hr, Intravenous, Continuous, Arturo Aranda MD, Last Rate: 125 mL/hr at 06/10/17 0645, 125 mL/hr at 06/10/17 0645  •  warfarin (COUMADIN) tablet 5 mg, 5 mg, Oral, Daily, Radha Adames, [facility]    Vital Sign Min/Max for last 24 hours  Temp  Min: 97.8 °F (36.6 °C)  Max: 98.3 °F (36.8 °C)   BP  Min: 116/76  Max: 149/85   Pulse  Min: 59  Max: 84   Resp  Min: 18  Max: 18   SpO2  Min: 95 %  Max: 100 %   No Data Recorded   Weight  Min: 160 lb (72.6 kg)  Max: 174 lb 3.2 oz (79 kg)     Flowsheet Rows         First Filed Value    Admission Height  75\" (190.5 cm) Documented at 06/09/2017 1205    Admission Weight  160 lb (72.6 kg) Documented at 06/09/2017 1205            Intake/Output Summary (Last 24 hours) at 06/10/17 0828  Last data filed at 06/10/17 0645   Gross per 24 hour   Intake             3076 ml   Output             1100 ml   Net             1976 ml       Physical Exam:     General Appearance:    Alert, cooperative, in no acute distress   Lungs:     Air Movement bilaterally normal     Heart:    Regular rate and rhythm normal S1 and S2 there is an S4.     Chest Wall:    No abnormalities observed   Abdomen:     Normal bowel sounds, no masses, no organomegaly, soft        non-tender, non-distended, no guarding, no rebound                tenderness   Extremities:   Moves all extremities well, no edema, no cyanosis, no             redness   Pulses:   Pulses palpable and equal bilaterally   Skin:   No bleeding, bruising or rash        Results Review:     Results from " last 7 days  Lab Units 06/10/17  0518 06/09/17  1236 06/03/17  0852   WBC 10*3/mm3 5.29 6.18 5.73   HEMOGLOBIN g/dL 10.6* 12.0* 10.8*   HEMATOCRIT % 34.4* 38.7* 36.2*   PLATELETS 10*3/mm3 222 258 200       Results from last 7 days  Lab Units 06/10/17  0518 06/09/17  1236 06/03/17  0852   SODIUM mmol/L 141 140 141   POTASSIUM mmol/L 3.0* 3.4* 3.7   CHLORIDE mmol/L 102 99 107   TOTAL CO2 mmol/L 31.0 36.0* 30.0   BUN mg/dL 14 19 9   CREATININE mg/dL 1.20 1.60* 0.90   GLUCOSE mg/dL 78 118* 99                Results from last 7 days  Lab Units 06/09/17  1236   TSH mIU/mL 2.141           Results from last 7 days  Lab Units 06/10/17  0518 06/09/17  1253   PROTIME Seconds 12.2* 12.5*   INR  1.12 1.14       Results from last 7 days  Lab Units 06/09/17  1236   CK TOTAL U/L 102       Intake/Output Summary (Last 24 hours) at 06/10/17 0828  Last data filed at 06/10/17 0645   Gross per 24 hour   Intake             3076 ml   Output             1100 ml   Net             1976 ml     Lab Results   Component Value Date    CKTOTAL 102 06/09/2017    CKMB 0.8 04/18/2016    CKMBINDEX  04/18/2016      Comment:      The relative index is statistically unreliable  if the CK is < or equal to 40 U/L.      TROPONINI 0.020 02/09/2017         I personally viewed and interpreted the patient's EKG/Telemetry/Laboratory data      EKG: EKG from today.  6/9/17: A paced 63 bpm, RBBB, LAFB    Telemetry: A paced rates from 60-84 bpm.     Ejection Fraction  No results found for: EF    Echo EF Estimated  Lab Results   Component Value Date    ECHOEFEST 50 06/01/2017         Present on Admission:  • Diabetes mellitus  • Dizziness  • Hypokalemia  • Hyperlipidemia  • History of PAF    Assessment/Plan   1. Elevated troponin- trending down, no angina or anginal equivalent. No need for ischemic evaluation at this time  Echo: EF 50%: Resolving.  2. PAF- currently A paced, CHADVASc = 7 on coumadin (started yesterday, switching from Eliquis), dosing per pharmacy; On  amiodarone therapy.  3. STEPHANIE- related to dehydration from N/V. IV fluids, with improvement from 1.6 to 1.2   4. Severe vertigo- per medicine team, possible orthostasis, Norvac on hold  5. CAD with previous CABG  6. ICM with previously placed (MDT) ICD, now EF 50%  7. Recent CVA      KAN Hutton  06/10/17  8:28 AM        I, Duy Nicolas MD, personally performed the services face to face as described and documented by the above named individual. I have made any necessary edits and it is both accurate and complete 6/10/2017  9:31 AM

## 2017-06-10 NOTE — PLAN OF CARE
Problem: Patient Care Overview (Adult)  Goal: Plan of Care Review  Outcome: Ongoing (interventions implemented as appropriate)    06/10/17 1154   Coping/Psychosocial Response Interventions   Plan Of Care Reviewed With patient   Outcome Evaluation   Outcome Summary/Follow up Plan Pt presents with dynamic standing balance and gait deficits and would benefit from skilled P.T. services to improve function.         Problem: Inpatient Physical Therapy  Goal: Transfer Training Goal 2 LTG- PT  Outcome: Ongoing (interventions implemented as appropriate)    06/10/17 1154   Transfer Training 2 PT LTG   Transfer Training PT 2 LTG, Date Established 06/10/17   Transfer Training PT 2 LTG, Time to Achieve 2 wks   Transfer Training PT 2 LTG, Activity Type sit to stand/stand to sit   Transfer Training PT 2 LTG, [place] Level independent   Transfer Training PT 2 LTG, Outcome goal ongoing       Goal: Gait Training Goal LTG- PT  Outcome: Ongoing (interventions implemented as appropriate)    06/10/17 1154   Gait Training PT LTG   Gait Training Goal PT LTG, Date Established 06/10/17   Gait Training Goal PT LTG, Time to Achieve 2 wks   Gait Training Goal PT LTG, [place] Level independent   Gait Training Goal PT LTG, Distance to Achieve 500   Gait Training Goal PT LTG, Outcome goal ongoing       Goal: Stair Training Goal LTG- PT  Outcome: Ongoing (interventions implemented as appropriate)    06/10/17 1154   Stair Training PT LTG   Stair Training Goal PT LTG, Date Established 06/10/17   Stair Training Goal PT LTG, Time to Achieve 2 wks   Stair Training Goal PT LTG, [place] Level independent   Stair Training Goal PT LTG, Assist Device 1 handrail   Stair Training Goal PT LTG, Distance to Achieve 10   Stair Training Goal PT LTG, Outcome goal ongoing

## 2017-06-10 NOTE — PROGRESS NOTES
"Pharmacy Consult  -  Warfarin    Pancho Bonner is a  77 y.o. male , pharmacy consulted for warfarin dosing. Patient is currently on Eliquis for history of DVT, started on last admission by cardiology. Patient would like to switch back to warfarin due to cost.     Patient admitted with complaints of neurological symptoms, CT negative, neuro has recommended to resume anticoagulation.     Height - 75\" (190.5 cm)  Weight - 174 lb 3.2 oz (79 kg)    Consulting Provider: - Dr. Zapata  Indication: - DVT/PE  Goal INR: -  2-3  Home Regimen:   - New start (Previously on 5mg daily prior to Eliquis)    Bridge Therapy: Enoxaparin 80mg SQ q12h    Drug-Drug Interactions with current regimen:  1. Amiodarone-increase INR   2. ASA-increase risk of bleeding  3. Lovenox-increase risk of bleeding       Warfarin Dosing During Admission:    Date  6/9 6/10          INR  1.14 1.10          Dose  (7.5mg)  5 mg               Labs:    Results from last 7 days   Lab Units 06/10/17  0518 06/09/17  1253 06/09/17  1236   INR  1.12 1.14 --    HEMOGLOBIN g/dL 10.6* --  12.0*   HEMATOCRIT % 34.4* --  38.7*   PLATELETS 10*3/mm3 222 --  258   Results from last 7 days   Lab Units 06/10/17  0518 06/09/17  1236   SODIUM mmol/L 141 140   POTASSIUM mmol/L 3.0* 3.4*   CHLORIDE mmol/L 102 99   TOTAL CO2 mmol/L 31.0 36.0*   BUN mg/dL 14 19   CREATININE mg/dL 1.20 1.60*   CALCIUM mg/dL 9.0 10.1   BILIRUBIN mg/dL --  0.8   ALK PHOS U/L --  65   ALT (SGPT) U/L --  15   AST (SGOT) U/L --  20   GLUCOSE mg/dL 78 118*     Current dietary intake: 100% of intake    Assessment/Plan:   1. INR is sub-therapeutic today with  a value of 1.12 today vs 1.14 yesterday s/p warfarin 7.5 mg X1 on 6/9          - Taking into account the patient's CHADVASc (7) and current INR, pharmacy will continue  warfarin 5 mg daily in antipation of INR increase in 48-72 hours           - Renal function is imporving with a creatinine of 1.20 and estimated CrCl of 50-60 ml/min, will " continue Lovenox 80 mg SQ Q12h     2. Monitor for s/sx of bleeding/thrombosis          3. Daily PT/INR       Thank you,  Scot Roblero PharmD.  6/10/2017  10:19 AM

## 2017-06-10 NOTE — THERAPY EVALUATION
Acute Care - Physical Therapy Initial Evaluation  [facility]     Patient Name: Pancho Bonner  : 1940  MRN: [id number]  Today's Date: 6/10/2017   Onset of Illness/Injury or Date of Surgery Date: 17 (symptoms began  but were still present . To ED)  Date of Referral to PT: 17         Admit Date: 2017     Visit Dx:    ICD-10-CM ICD-9-CM   1. Dizziness R42 780.4   2. Nausea and vomiting, intractability of vomiting not specified, unspecified vomiting type R11.2 787.01   3. Acute renal failure, unspecified acute renal failure type N17.9 584.9   4. Hypokalemia E87.6 276.8   5. Ischemic cardiomyopathy I25.5 414.8   6. Elevated troponin R79.89 790.6   7. Poor tolerance for ambulation Z78.9 V49.89   8. Impaired functional mobility, balance, gait, and endurance Z74.09 V49.89     Patient Active Problem List   Diagnosis   • Arnold-Chiari malformation, type I   • Complex partial epilepsy   • Apoplectic attack   • Acute embolic Left MCA CVA at admission. INR subtherapeutic at 1.1. 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 On chronic Coumadin at admission, but subtherapeutic   • Diabetes mellitus   • History of Ischemic cardiomyopathy. EF 30% in past, EF 50% on echo 17   • Hyperlipidemia   • Hypertension   • H/O NSVT status post PPM/ICD   • Coronary artery disease with CABG in , and . Stent placed 2017   • History of PAF   • Dizziness   • Complex partial seizure   • Stroke syndrome   • H/O cardiac disorder   • Hypokalemia   • Dehydration     Past Medical History:   Diagnosis Date   • Anxiety    • Arnold-Chiari malformation, type I     followed by Dr. Martinez, but last note available was    • BPH (benign prostatic hyperplasia)    • Chronic Pleural effusion on right    • Complex partial seizure     followed by Dr. Martinez, but last note available was    • Congestive heart failure     follows with   Peña at [facility], EF 30%   • Coronary artery disease     on ASA/plavix   • CVA (cerebral vascular accident)     apical thrombus on chronic coumadin   • Diabetes mellitus    • ED (erectile dysfunction)    • GERD (gastroesophageal reflux disease)    • Hyperlipidemia    • Hypertension    • Ischemic cardiomyopathy    • Myocardial infarction    • Non-sustained ventricular tachycardia      Past Surgical History:   Procedure Laterality Date   • CARDIAC CATHETERIZATION  02/2016    [facility], 3 vessel disease, patent LIMA to LAD bypass graft    • CARDIAC CATHETERIZATION  05/25/2017    [facility], severe 3 vessel disease, patent LIMA to LAD bypass graft, recommend medical management    • CARDIAC DEFIBRILLATOR PLACEMENT  08/2009    w/ PPM Medtronic (DDD)   • COLONOSCOPY W/ BIOPSIES     • CORONARY ANGIOPLASTY WITH STENT PLACEMENT     • CORONARY ARTERY BYPASS GRAFT  2000    [facility]   • INSERT / REPLACE / REMOVE PACEMAKER  05/26/2017    Medtronic generator change, Dr. Ho, [facility]    • INTERVENTIONAL RADIOLOGY PROCEDURE Bilateral 6/1/2017    Procedure: Carotid Cerebral Angiogram;  Surgeon: Wil Rodrigez MD;  Location: [facility] INVASIVE LOCATION;  Service:           PT ASSESSMENT (last 72 hours)      PT Evaluation       06/10/17 1120 06/10/17 1035    Rehab Evaluation    Document Type evaluation  -LS     Subjective Information agree to therapy  -LS     General Information    Patient Profile Review yes  -LS     Onset of Illness/Injury or Date of Surgery Date 06/08/17   symptoms began 6/8 but were still present 6/9. To ED  -LS     General Observations MADELEINE Larson  -LS     Pertinent History Of Current Problem On 6/8, stood from couch & had extreme vertigo, trouble walking; onsent N&V, blurry vision, confusion. Awoke 6/9 w/continued light-headedness & decreased bal. To ED   -LS     Precautions/Limitations --   need assistance  -LS     Prior Level of Function independent:;all household mobility;community mobility;driving  -LS     Equipment  Currently Used at Home --   has walker (does not think wheels); does not use walker  -LS     Plans/Goals Discussed With patient;spouse/S.O.;agreed upon  -LS     Risks Reviewed patient:;change in vital signs;LOB  -LS     Benefits Reviewed patient:;improve function;increase independence;increase balance  -LS     Barriers to Rehab medically complex  -LS     Living Environment    Lives With spouse  -LS     Living Arrangements house  -LS     Home Accessibility --   tri-level. 12 steps w/2 HR to enter.   -LS     Stair Railings at Home --   another flight stairs after entering home to go to bedroom  -LS     Clinical Impression    Date of Referral to PT 06/09/17  -LS     PT Diagnosis Impaired balance and functional mobility  -LS     Patient/Family Goals Statement Pt wants to go home  -LS     Criteria for Skilled Therapeutic Interventions Met yes;treatment indicated  -LS     Rehab Potential good, to achieve stated therapy goals  -LS     Vital Signs    Pre Systolic BP Rehab 133  -LS     Pre Treatment Diastolic BP 73  -LS     Intra Systolic BP Rehab 137  -LS     Intra Treatment Diastolic BP 75  -LS     Post Systolic BP Rehab 141  -LS     Post Treatment Diastolic BP 78  -LS     Pretreatment Heart Rate (beats/min) 61  -LS     Intratreatment Heart Rate (beats/min) 61  -LS     Posttreatment Heart Rate (beats/min) 65  -LS     Post SpO2 (%) 98  -LS     O2 Delivery Post Treatment room air  -LS     Pre Patient Position Supine  -LS     Intra Patient Position Standing  -LS     Post Patient Position Sitting  -LS     Pain Assessment    Pain Assessment No/denies pain  -LS     Cognitive Assessment/Intervention    Current Cognitive/Communication Assessment functional  -LS     ROM (Range of Motion)    General ROM no range of motion deficits identified   BLEs  -LS     MMT (Manual Muscle Testing)    General MMT Assessment --   B hip flexors, knee extensors, & ankle dorsiflexors 4+/5.  -LS     Muscle Tone Assessment    Muscle Tone Assessment  --   -    Bed Mobility, Assessment/Treatment    Bed Mob, Supine to Sit, [place] independent  -LS     Bed Mobility, Comment did not test sit to supine. based on PT's clinical judgement, pt would be indep.  -LS     Transfer Assessment/Treatment    Transfers, Sit-Stand [place] stand by assist  -LS     Transfers, Stand-Sit [place] stand by assist  -LS     Gait Assessment/Treatment    Gait, [place] Level contact guard assist  -LS     Gait, Distance (Feet) 300   walked 300' w/o RW & another 50' w/RW for assessment  -LS     Gait, Gait Deviations --   fast pace  -LS     Gait, Safety Issues balance decreased during turns  -LS     Gait, Impairments impaired balance  -LS     Gait, Comment Pt occasionally crosses one foot in front of the other. He was able to catch balance to continue walking without external assitance from P.T. Did assess w/RW, & pt walked 50' SBA but does not want to use walker at home.  -LS     Positioning and Restraints    Pre-Treatment Position in bed  -LS     Post Treatment Position chair  -LS     In Chair notified nsg;sitting;call light within reach;with family/caregiver;legs elevated   w/spouse  -LS       06/09/17 1518 06/09/17 1516    General Information    Equipment Currently Used at Home  none  -MS    Living Environment    Lives With spouse  -MS     Living Arrangements house  -MS     Home Accessibility no concerns  -MS     Stair Railings at Home none  -MS     Type of Financial/Environmental Concern none  -MS     Transportation Available car  -MS       06/09/17 1510       General Information    Equipment Currently Used at Home none  -MS       User Key  (r) = Recorded By, (t) = Taken By, (c) = Cosigned By    Initials Name Provider Type     Debbie Leblanc, LEE ANN Physical Therapist    MS Annabel Caraballo, RN Registered Nurse    ADRIANA Mathews RN Registered Nurse          Physical Therapy Education     Title: PT OT SLP Therapies (Active)     Topic: Physical Therapy (Active)      Point: Precautions (Done)    Learning Progress Summary    Learner Readiness Method Response Comment Documented by Status   Patient Acceptance E VU,NR Pt is having difficulties w/bal.discussed that although he was able to catch bal w/o  assist, one foot sometimes crosses in front of other. Pt does not want to use walker. PT instructed pt to walk close to wall or stationary furniture until bal improves.  06/10/17 1157 Done   Significant Other Acceptance E VU,NR Pt is having difficulties w/bal.discussed that although he was able to catch bal w/o  assist, one foot sometimes crosses in front of other. Pt does not want to use walker. PT instructed pt to walk close to wall or stationary furniture until bal improves.  06/10/17 1157 Done                      User Key     Initials Effective Dates Name Provider Type Discipline     06/19/15 -  Debbie Leblanc, PT Physical Therapist PT                PT Recommendation and Plan  Anticipated Discharge Disposition: home with outpatient services, home with home health (to be determined)  Planned Therapy Interventions: balance training, gait training, stair training, transfer training  PT Frequency: daily, per priority policy  Plan of Care Review  Plan Of Care Reviewed With: patient  Outcome Summary/Follow up Plan: Pt presents with dynamic standing balance and gait deficits and would benefit from skilled P.T. services to improve function.          IP PT Goals       06/10/17 1154          Transfer Training 2 PT LTG    Transfer Training PT 2 LTG, Date Established 06/10/17  -LS      Transfer Training PT 2 LTG, Time to Achieve 2 wks  -LS      Transfer Training PT 2 LTG, Activity Type sit to stand/stand to sit  -LS      Transfer Training PT 2 LTG, [place] Level independent  -LS      Transfer Training PT 2 LTG, Outcome goal ongoing  -LS      Gait Training PT LTG    Gait Training Goal PT LTG, Date Established 06/10/17  -LS      Gait Training Goal PT LTG, Time to Achieve 2 wks   -LS      Gait Training Goal PT LTG, [place] Level independent  -LS      Gait Training Goal PT LTG, Distance to Achieve 500  -LS      Gait Training Goal PT LTG, Outcome goal ongoing  -LS      Stair Training PT LTG    Stair Training Goal PT LTG, Date Established 06/10/17  -LS      Stair Training Goal PT LTG, Time to Achieve 2 wks  -LS      Stair Training Goal PT LTG, [place] Level independent  -LS      Stair Training Goal PT LTG, Assist Device 1 handrail  -LS      Stair Training Goal PT LTG, Distance to Achieve 10  -LS      Stair Training Goal PT LTG, Outcome goal ongoing  -LS        User Key  (r) = Recorded By, (t) = Taken By, (c) = Cosigned By    Initials Name Provider Type    SHIRA Leblanc PT Physical Therapist               Time Calculation:         PT Charges       06/10/17 1212          Time Calculation    Start Time 1120  -LS      PT Received On 06/10/17  -LS      PT Goal Re-Cert Due Date 06/20/17  -        User Key  (r) = Recorded By, (t) = Taken By, (c) = Cosigned By    Initials Name Provider Type    SHIRA Leblanc PT Physical Therapist          Therapy Charges for Today     Code Description Service Date Service Provider Modifiers Qty    69155416007 HC PT EVAL MOD COMPLEXITY 4 6/10/2017 Debbie Leblanc, PT GP 1                 Debbie Leblanc, PT  6/10/2017

## 2017-06-10 NOTE — THERAPY DISCHARGE NOTE
Acute Care - Occupational Therapy Initial Eval/Discharge  [facility]     Patient Name: Pancho Bonner  : 1940  MRN: [id number]  Today's Date: 6/10/2017  Onset of Illness/Injury or Date of Surgery Date: 17 (symptoms began  but were still present . To ED)  Date of Referral to OT: 17  Referring Physician: MADELEINE Ingram      Admit Date: 2017       ICD-10-CM ICD-9-CM   1. Dizziness R42 780.4   2. Nausea and vomiting, intractability of vomiting not specified, unspecified vomiting type R11.2 787.01   3. Acute renal failure, unspecified acute renal failure type N17.9 584.9   4. Hypokalemia E87.6 276.8   5. Ischemic cardiomyopathy I25.5 414.8   6. Elevated troponin R79.89 790.6   7. Poor tolerance for ambulation Z78.9 V49.89   8. Impaired functional mobility, balance, gait, and endurance Z74.09 V49.89     Patient Active Problem List   Diagnosis   • Arnold-Chiari malformation, type I   • Complex partial epilepsy   • Apoplectic attack   • Acute embolic Left MCA CVA at admission. INR subtherapeutic at 1.1. 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 On chronic Coumadin at admission, but subtherapeutic   • Diabetes mellitus   • History of Ischemic cardiomyopathy. EF 30% in past, EF 50% on echo 17   • Hyperlipidemia   • Hypertension   • H/O NSVT status post PPM/ICD   • Coronary artery disease with CABG in , and . Stent placed 2017   • History of PAF   • Dizziness   • Complex partial seizure   • Stroke syndrome   • H/O cardiac disorder   • Hypokalemia   • Dehydration     Past Medical History:   Diagnosis Date   • Anxiety    • Arnold-Chiari malformation, type I     followed by Dr. Martinez, but last note available was    • BPH (benign prostatic hyperplasia)    • Chronic Pleural effusion on right    • Complex partial seizure     followed by Dr. Martinez, but last note available was    • Congestive  heart failure     follows with Dr. Pompa at [facility], EF 30%   • Coronary artery disease     on ASA/plavix   • CVA (cerebral vascular accident)     apical thrombus on chronic coumadin   • Diabetes mellitus    • ED (erectile dysfunction)    • GERD (gastroesophageal reflux disease)    • Hyperlipidemia    • Hypertension    • Ischemic cardiomyopathy    • Myocardial infarction    • Non-sustained ventricular tachycardia      Past Surgical History:   Procedure Laterality Date   • CARDIAC CATHETERIZATION  02/2016    [facility], 3 vessel disease, patent LIMA to LAD bypass graft    • CARDIAC CATHETERIZATION  05/25/2017    [facility], severe 3 vessel disease, patent LIMA to LAD bypass graft, recommend medical management    • CARDIAC DEFIBRILLATOR PLACEMENT  08/2009    w/ PPM Medtronic (DDD)   • COLONOSCOPY W/ BIOPSIES     • CORONARY ANGIOPLASTY WITH STENT PLACEMENT     • CORONARY ARTERY BYPASS GRAFT  2000    [facility]   • INSERT / REPLACE / REMOVE PACEMAKER  05/26/2017    Medtronic generator change, Dr. Ho, [facility]    • INTERVENTIONAL RADIOLOGY PROCEDURE Bilateral 6/1/2017    Procedure: Carotid Cerebral Angiogram;  Surgeon: Wil Rodrigez MD;  Location: [facility] CATH INVASIVE LOCATION;  Service:           OT ASSESSMENT FLOWSHEET (last 72 hours)      OT Evaluation       06/10/17 1311 06/10/17 1120 06/10/17 1115 06/10/17 1035 06/09/17 1518    Rehab Evaluation    Document Type  evaluation  -LS evaluation;discharge summary  -TA      Subjective Information  agree to therapy  -LS agree to therapy;no complaints  -TA      Patient Effort, Rehab Treatment   good  -TA      Symptoms Noted During/After Treatment   none  -TA      General Information    Patient Profile Review  yes  -LS yes  -TA      Onset of Illness/Injury or Date of Surgery Date  06/08/17   symptoms began 6/8 but were still present 6/9. To ED  -LS 06/09/17  -TA      Referring Physician   MADELEINE Ingram  -TA      General Observations  MADELEINE Larson Pt supine, IV intact LUE, RA; wife  present at bedside  -TA      Pertinent History Of Current Problem  On 6/8, stood from couch & had extreme vertigo, trouble walking; onsent N&V, blurry vision, confusion. Awoke 6/9 w/continued light-headedness & decreased bal. To ED   -LS On 06/08, pt stood from couch and had extreme vertigo, difficulty walking, onset of N/V, blurry vision, confusion. awoke 06/09 with continued light headedness and decreased balance; to ED.  -TA      Precautions/Limitations  --   need assistance  -LS fall precautions  -TA      Prior Level of Function  independent:;all household mobility;community mobility;driving  -LS independent:;all household mobility;community mobility;gait;transfer;bed mobility;ADL's;driving  -TA      Equipment Currently Used at Home  --   has walker (does not think wheels); does not use walker  -LS --   Has, but does not use SW, WC.  -TA      Plans/Goals Discussed With  patient;spouse/S.O.;agreed upon  -LS patient;spouse/S.O.;agreed upon  -TA      Risks Reviewed  patient:;change in vital signs;LOB  -LS patient:;LOB;dizziness;change in vital signs;lines disloged  -TA      Benefits Reviewed  patient:;improve function;increase independence;increase balance  -LS patient:;improve function;increase independence;increase knowledge  -TA      Barriers to Rehab  medically complex  -LS medically complex  -TA      Living Environment    Lives With  spouse  -LS spouse  -TA  spouse  -MS    Living Arrangements  house  -LS house  -TA  house  -MS    Home Accessibility  --   tri-level. 12 steps w/2 HR to enter.   -LS stairs to enter home;stairs within home  -TA  no concerns  -MS    Number of Stairs to Enter Home   12  -TA      Number of Stairs Within Home   12  -TA      Stair Railings at Home  --   another flight stairs after entering home to go to bedroom  -LS inside, present at both sides;outside, present at both sides  -TA  none  -MS    Type of Financial/Environmental Concern     none  -MS    Transportation Available     car   -MS    Clinical Impression    Date of Referral to OT   06/09/17  -TA      OT Diagnosis   Impaired mobility and ADLs  -TA      Impairments Found (describe specific impairments)   gait, locomotion, and balance  -TA      Patient/Family Goals Statement   Return home  -TA      Criteria for Skilled Therapeutic Interventions Met   no;no problems identified which require skilled intervention  -TA      Anticipated Discharge Disposition home with assist  -TA  home with assist  -TA      Vital Signs    Pre Systolic BP Rehab  133  -  -TA      Pre Treatment Diastolic BP  73  -LS 73  -TA      Intra Systolic BP Rehab  137  -   standing  -TA      Intra Treatment Diastolic BP  75  -LS 75  -TA      Post Systolic BP Rehab  141  -  -TA      Post Treatment Diastolic BP  78  -LS 78  -TA      Pretreatment Heart Rate (beats/min)  61  -LS 61  -TA      Intratreatment Heart Rate (beats/min)  61  -LS 61  -TA      Posttreatment Heart Rate (beats/min)  65  -LS 65  -TA      Pre SpO2 (%)   99  -TA      O2 Delivery Pre Treatment   room air  -TA      Post SpO2 (%)  98  -LS 98  -TA      O2 Delivery Post Treatment  room air  -LS room air  -TA      Pre Patient Position  Supine  -LS Supine  -TA      Intra Patient Position  Standing  -LS Standing  -TA      Post Patient Position  Sitting  -LS Sitting  -TA      Pain Assessment    Pain Assessment  No/denies pain  -LS No/denies pain  -TA      Vision Assessment/Intervention    Visual Impairment   WFL with corrective lenses  -TA      Cognitive Assessment/Intervention    Current Cognitive/Communication Assessment  functional  -LS functional  -TA      Orientation Status   oriented x 4  -TA      Follows Commands/Answers Questions   100% of the time;able to follow single-step instructions  -TA      Personal Safety   WNL/WFL  -TA      Personal Safety Interventions   fall prevention program maintained;gait belt;nonskid shoes/slippers when out of bed;supervised activity  -TA      ROM (Range of  Motion)    General ROM  no range of motion deficits identified   BLEs  -LS no range of motion deficits identified  -TA      General ROM Detail   RUE WFL; L elbow and joints distally WFLs, L shld limited by recent pacemaker procedure precautions  -TA      MMT (Manual Muscle Testing)    General MMT Assessment  --   B hip flexors, knee extensors, & ankle dorsiflexors 4+/5.  -LS no strength deficits identified  -TA      General MMT Assessment Detail   RUE 5/5; L  5/5  -TA      Muscle Tone Assessment    Muscle Tone Assessment    --  -RC     Bed Mobility, Assessment/Treatment    Bed Mob, Supine to Sit, [place]  independent  -LS independent  -TA      Bed Mob, Sit to Supine, [place]   not tested  -TA      Bed Mobility, Comment  did not test sit to supine. based on PT's clinical judgement, pt would be indep.  -LS       Transfer Assessment/Treatment    Transfers, Sit-Stand [place]  stand by assist  -LS stand by assist  -TA      Transfers, Stand-Sit [place]  stand by assist  -LS stand by assist  -TA      Transfer, Comment   good safety awareness  -TA      Functional Mobility    Functional Mobility- Ind. Level   contact guard assist  -TA      Functional Mobility- Device   other (see comments)   gait belt  -TA      Functional Mobility-Distance (Feet)   --   In hallway  -TA      Functional Mobility- Safety Issues   balance decreased during turns;weight-shifting ability decreased  -TA      Functional Mobility- Comment   unsteady at times but able to self correct without physical assist  -TA      Lower Body Dressing Assessment/Training    LB Dressing Assess/Train, Clothing Type   donning:;doffing:;slipper socks  -TA      LB Dressing Assess/Train, Position   long sitting  -TA      LB Dressing Assess/Train, [place]   independent  -TA      Toileting Assessment/Training    Toileting Assess/Train, Indepen Level   independent  -TA      Toileting Assess/Train, Comment   No LOB in standing  -TA      Motor  Skills/Interventions    Additional Documentation   Balance Skills Training (Group)  -TA      Balance Skills Training    Sitting-Level of Assistance   Independent  -TA      Sitting-Balance Support   Feet supported  -TA      Standing-Level of Assistance   Close supervision  -TA      Static Standing Balance Support   No upper extremity supported  -TA      Standing-Balance Activities   Weight Shift A-P;Weight Shift R-L;Reaching for objects;Reaching across midline  -TA      Sensory Assessment/Intervention    Light Touch   LUE;RUE  -TA      LUE Light Touch   WNL  -TA      RUE Light Touch   WNL  -TA      General Therapy Interventions    Planned Therapy Interventions   other (see comments)   Pt/spouse education  -TA      Positioning and Restraints    Pre-Treatment Position  in bed  -LS in bed  -TA      Post Treatment Position  chair  -LS chair  -TA      In Chair  notified nsg;sitting;call light within reach;with family/caregiver;legs elevated   w/spouse  -LS reclined;call light within reach;encouraged to call for assist;with family/caregiver;legs elevated  -TA        06/09/17 1516 06/09/17 1510             General Information    Equipment Currently Used at Home none  -MS none  -MS       Functional Level Prior    Ambulation 0-->independent  -MS        Transferring 0-->independent  -MS        Toileting 0-->independent  -MS        Bathing 0-->independent  -MS        Dressing 0-->independent  -MS        Eating 0-->independent  -MS        Communication 0-->understands/communicates without difficulty  -MS        Swallowing 0-->swallows foods/liquids without difficulty  -MS        Prior Functional Level Comment na  -MS          User Key  (r) = Recorded By, (t) = Taken By, (c) = Cosigned By    Initials Name Effective Dates    LS Debbie Leblanc, PT 06/19/15 -     MS Annabel Caraballo, RN 06/16/16 -     TA Peña Sparrow, OT 03/14/16 -     ADRIANA Mathews RN 06/16/16 -           Occupational Therapy Education     Title:  PT OT SLP Therapies (Active)     Topic: Occupational Therapy (Active)     Point: Precautions (Done)    Description: Instruct learner(s) on prescribed precautions during self-care and functional transfers.    Learning Progress Summary    Learner Readiness Method Response Comment Documented by Status   Patient Acceptance E VU Pt/spouse educated re supervision with bathing initially, home safety to reduce risk of falls.  06/10/17 1307 Done   Significant Other Acceptance E VU Pt/spouse educated re supervision with bathing initially, home safety to reduce risk of falls.  06/10/17 1307 Done                      User Key     Initials Effective Dates Name Provider Type Discipline     03/14/16 -  Peña Sparrow OT Occupational Therapist OT                OT Recommendation and Plan  Anticipated Discharge Disposition: home with assist  Planned Therapy Interventions: other (see comments) (Pt/spouse education)  Plan of Care Review  Plan Of Care Reviewed With: patient  Outcome Summary/Follow up Plan: Pt presents at fxl baseline/independent with ADLs; PT addressing fxl mobility; pt/spouse completed education re supervision with bathing initially, home safety to reduce risk of falls and verbalized good understanding. No DME/AE needs identified. No further skilled OT services indicated at this time. Recommend home with assist.           OT Goals       06/10/17 1308          Patient Education OT LTG    Patient Education OT LTG, Date Established 06/10/17  -TA      Patient Education OT LTG, Time to Achieve 1 day  -TA      Patient Education OT LTG, Education Type home safety  -TA      Patient Education OT LTG, Education Understanding verbalizes understanding  -TA      Patient Education OT LTG Outcome goal met  -TA        User Key  (r) = Recorded By, (t) = Taken By, (c) = Cosigned By    Initials Name Provider Type    NIKIA Sparrow OT Occupational Therapist                Outcome Measures       06/10/17 1118           How much help from another is currently needed...    Putting on and taking off regular lower body clothing? 4  -TA      Bathing (including washing, rinsing, and drying) 4  -TA      Toileting (which includes using toilet bed pan or urinal) 4  -TA      Putting on and taking off regular upper body clothing 4  -TA      Taking care of personal grooming (such as brushing teeth) 4  -TA      Eating meals 4  -TA      Score 24  -TA      Functional Assessment    Outcome Measure Options AM-PAC 6 Clicks Daily Activity (OT)  -TA        User Key  (r) = Recorded By, (t) = Taken By, (c) = Cosigned By    Initials Name Provider Type    NIKIA Sparrow OT Occupational Therapist          Time Calculation:         Time Calculation- OT       06/10/17 1311          Time Calculation- OT    OT Start Time 1115   ttc 0 minutes  -TA      Total Timed Code Minutes- OT 0 minute(s)  -TA      OT Received On 06/10/17  -TA        User Key  (r) = Recorded By, (t) = Taken By, (c) = Cosigned By    Initials Name Provider Type    NIKIA Sparrow OT Occupational Therapist          Therapy Charges for Today     Code Description Service Date Service Provider Modifiers Qty    [id number]  OT EVAL LOW COMPLEXITY 4 6/10/2017 Peña Sparrow OT GO 1               OT Discharge Summary  Anticipated Discharge Disposition: home with assist  Reason for Discharge: All goals achieved, At baseline function  Outcomes Achieved: Able to achieve all goals within established timeline  Discharge Destination: Home with assist    Peña Sparrow OT  6/10/2017

## 2017-06-10 NOTE — PROGRESS NOTES
Continued Stay Note  [facility]     Patient Name: Pancho Bonner  MRN: [id number]  Today's Date: 6/10/2017    Admit Date: 6/9/2017          Discharge Plan       06/10/17 1507    Case Management/Social Work Plan    Plan Home    Additional Comments Spoke with patient at bedside. Patient's plan is to go home upon discharge.  Patient states he has a walker, cane and crutches at home.  PT/OT seeing.  Patient ambulated 300 ft with PT.  Patient denies any needs at this time.  CM will continue to follow.              Discharge Codes     None            Kylah Hogan RN

## 2017-06-11 VITALS
OXYGEN SATURATION: 96 % | HEART RATE: 64 BPM | TEMPERATURE: 98 F | WEIGHT: 174.2 LBS | DIASTOLIC BLOOD PRESSURE: 112 MMHG | RESPIRATION RATE: 18 BRPM | BODY MASS INDEX: 21.66 KG/M2 | HEIGHT: 75 IN | SYSTOLIC BLOOD PRESSURE: 123 MMHG

## 2017-06-11 PROBLEM — R42 VERTIGO: Status: ACTIVE | Noted: 2017-06-11

## 2017-06-11 LAB
ANION GAP SERPL CALCULATED.3IONS-SCNC: 2 MMOL/L (ref 3–11)
BUN BLD-MCNC: 10 MG/DL (ref 9–23)
BUN/CREAT SERPL: 10 (ref 7–25)
CALCIUM SPEC-SCNC: 9 MG/DL (ref 8.7–10.4)
CHLORIDE SERPL-SCNC: 107 MMOL/L (ref 99–109)
CO2 SERPL-SCNC: 33 MMOL/L (ref 20–31)
CREAT BLD-MCNC: 1 MG/DL (ref 0.6–1.3)
GFR SERPL CREATININE-BSD FRML MDRD: 72 ML/MIN/1.73
GLUCOSE BLD-MCNC: 80 MG/DL (ref 70–100)
GLUCOSE BLDC GLUCOMTR-MCNC: 124 MG/DL (ref 70–130)
GLUCOSE BLDC GLUCOMTR-MCNC: 138 MG/DL (ref 70–130)
INR PPP: 1.15
POTASSIUM BLD-SCNC: 3.6 MMOL/L (ref 3.5–5.5)
PROTHROMBIN TIME: 12.6 SECONDS (ref 9.6–11.5)
SODIUM BLD-SCNC: 142 MMOL/L (ref 132–146)

## 2017-06-11 PROCEDURE — 85610 PROTHROMBIN TIME: CPT

## 2017-06-11 PROCEDURE — 96372 THER/PROPH/DIAG INJ SC/IM: CPT

## 2017-06-11 PROCEDURE — G0378 HOSPITAL OBSERVATION PER HR: HCPCS

## 2017-06-11 PROCEDURE — 99213 OFFICE O/P EST LOW 20 MIN: CPT | Performed by: INTERNAL MEDICINE

## 2017-06-11 PROCEDURE — 80048 BASIC METABOLIC PNL TOTAL CA: CPT | Performed by: INTERNAL MEDICINE

## 2017-06-11 PROCEDURE — 25010000002 ENOXAPARIN PER 10 MG

## 2017-06-11 PROCEDURE — 82962 GLUCOSE BLOOD TEST: CPT

## 2017-06-11 PROCEDURE — 99239 HOSP IP/OBS DSCHRG MGMT >30: CPT | Performed by: NURSE PRACTITIONER

## 2017-06-11 RX ORDER — MECLIZINE HCL 12.5 MG/1
12.5 TABLET ORAL 3 TIMES DAILY PRN
Qty: 15 TABLET | Refills: 0 | Status: ON HOLD | OUTPATIENT
Start: 2017-06-11 | End: 2020-07-04

## 2017-06-11 RX ORDER — WARFARIN SODIUM 5 MG/1
TABLET ORAL
Status: ON HOLD
Start: 2017-06-11 | End: 2020-07-04

## 2017-06-11 RX ORDER — WARFARIN SODIUM 2.5 MG/1
TABLET ORAL
Status: CANCELLED | OUTPATIENT
Start: 2017-06-11

## 2017-06-11 RX ORDER — WARFARIN SODIUM 2.5 MG/1
2.5 TABLET ORAL
Status: DISCONTINUED | OUTPATIENT
Start: 2017-06-11 | End: 2017-06-11

## 2017-06-11 RX ORDER — WARFARIN SODIUM 2.5 MG/1
2.5 TABLET ORAL
Status: COMPLETED | OUTPATIENT
Start: 2017-06-11 | End: 2017-06-11

## 2017-06-11 RX ORDER — AMLODIPINE BESYLATE 10 MG/1
5 TABLET ORAL DAILY
Status: ON HOLD
Start: 2017-06-11 | End: 2020-07-04

## 2017-06-11 RX ADMIN — CARVEDILOL 6.25 MG: 6.25 TABLET, FILM COATED ORAL at 08:22

## 2017-06-11 RX ADMIN — ENOXAPARIN SODIUM 80 MG: 80 INJECTION SUBCUTANEOUS at 07:54

## 2017-06-11 RX ADMIN — PANTOPRAZOLE SODIUM 40 MG: 40 TABLET, DELAYED RELEASE ORAL at 07:54

## 2017-06-11 RX ADMIN — WARFARIN SODIUM 2.5 MG: 2.5 TABLET ORAL at 13:01

## 2017-06-11 RX ADMIN — Medication 325 MG: at 08:22

## 2017-06-11 RX ADMIN — AMLODIPINE BESYLATE 5 MG: 5 TABLET ORAL at 08:21

## 2017-06-11 RX ADMIN — LAMOTRIGINE 150 MG: 100 TABLET ORAL at 08:23

## 2017-06-11 RX ADMIN — ASPIRIN 325 MG ORAL TABLET 325 MG: 325 PILL ORAL at 08:21

## 2017-06-11 RX ADMIN — WARFARIN SODIUM 5 MG: 5 TABLET ORAL at 13:02

## 2017-06-11 RX ADMIN — AMIODARONE HYDROCHLORIDE 200 MG: 200 TABLET ORAL at 08:22

## 2017-06-11 NOTE — PROGRESS NOTES
[facility] at [facility]  Progress Note       LOS: 2 days   Patient Care Team:  Shar Obregon MD as PCP - General    Chief Complaint:  Follow up elevated troponin    Subjective    No complaints. Would like to go home today.  No new issues today.  No chest pain or shortness of breath.    Interval History:         Review of Systems:   Pertinent positives in HPI, all others reviewed and negative.      Objective       Current Facility-Administered Medications:   •  acetaminophen (TYLENOL) tablet 650 mg, 650 mg, Oral, Q4H PRN, Cande Larson, APRN  •  amiodarone (PACERONE) tablet 200 mg, 200 mg, Oral, Daily, Cande Larson, APRN, 200 mg at 06/10/17 0924  •  amLODIPine (NORVASC) tablet 5 mg, 5 mg, Oral, Daily, Cande Larson APRN, 5 mg at 06/10/17 0925  •  aspirin tablet 325 mg, 325 mg, Oral, Daily, Cande Larson APRN, 325 mg at 06/10/17 0925  •  atorvastatin (LIPITOR) tablet 40 mg, 40 mg, Oral, Nightly, Cande Larson APRN, 40 mg at 06/10/17 2053  •  bisacodyl (DULCOLAX) suppository 10 mg, 10 mg, Rectal, Daily PRN, Cande Larson, APRN  •  carvedilol (COREG) tablet 6.25 mg, 6.25 mg, Oral, BID, Cande Larson APRN, 6.25 mg at 06/10/17 1708  •  dextrose (D50W) solution 25 g, 25 g, Intravenous, Q15 Min PRN, Cande Larson, APRN  •  dextrose (GLUTOSE) oral gel 15 g, 15 g, Oral, Q15 Min PRN, Cande Larson, APRN  •  docusate sodium (COLACE) capsule 100 mg, 100 mg, Oral, BID, Cande Larson APRN, 100 mg at 06/10/17 1708  •  enoxaparin (LOVENOX) syringe 80 mg, 80 mg, Subcutaneous, Q12H, Radha Adames, RPH, 80 mg at 06/10/17 1706  •  ferrous sulfate tablet 325 mg, 325 mg, Oral, Daily With Breakfast, Cande Larson APRN, 325 mg at 06/10/17 0930  •  glucagon (GLUCAGEN) injection 1 mg, 1 mg, Subcutaneous, Q15 Min PRN, Cande Larson, APRN  •  insulin lispro (humaLOG) injection 0-7 Units, 0-7 Units, Subcutaneous, 4x Daily With Meals & Nightly,  "MADELEINE Farnsworth  •  lamoTRIgine (LaMICtal) tablet 150 mg, 150 mg, Oral, Q12H, MADELEINE Farnsworht, 150 mg at 06/10/17 2052  •  meclizine (ANTIVERT) tablet 12.5 mg, 12.5 mg, Oral, TID PRN, Nicolas Dias MD  •  pantoprazole (PROTONIX) EC tablet 40 mg, 40 mg, Oral, Q AM, MADELEINE Farnsworth, 40 mg at 06/10/17 0643  •  Pharmacy to Dose enoxaparin (LOVENOX), , Does not apply, Continuous PRN, Zak Zapata MD  •  Pharmacy to dose warfarin, , Does not apply, Continuous PRN, Zak Zapata MD  •  polyethylene glycol (MIRALAX) powder 17 g, 17 g, Oral, Daily, Nicolas Dias MD  •  potassium chloride (MICRO-K) CR capsule 40 mEq, 40 mEq, Oral, PRN, 40 mEq at 06/10/17 2052 **OR** potassium chloride (KLOR-CON) packet 40 mEq, 40 mEq, Oral, PRN **OR** potassium chloride 10 mEq in 100 mL IVPB, 10 mEq, Intravenous, Q1H PRN, Nicolas Dias MD  •  sodium chloride 0.9 % flush 1-10 mL, 1-10 mL, Intravenous, PRN, MADELEINE Farnsworth  •  sodium chloride 0.9 % infusion, 50 mL/hr, Intravenous, Continuous, Nicolas Dias MD, Last Rate: 50 mL/hr at 06/10/17 1654, 50 mL/hr at 06/10/17 1654  •  warfarin (COUMADIN) tablet 5 mg, 5 mg, Oral, Daily, Radha Adames, RPH, 5 mg at 06/10/17 1708    Vital Sign Min/Max for last 24 hours  Temp  Min: 97.9 °F (36.6 °C)  Max: 98.1 °F (36.7 °C)   BP  Min: 133/73  Max: 144/78   Pulse  Min: 59  Max: 64   Resp  Min: 18  Max: 18   SpO2  Min: 96 %  Max: 98 %   No Data Recorded   No Data Recorded     Flowsheet Rows         First Filed Value    Admission Height  75\" (190.5 cm) Documented at 06/09/2017 1205    Admission Weight  160 lb (72.6 kg) Documented at 06/09/2017 1205            Intake/Output Summary (Last 24 hours) at 06/11/17 0740  Last data filed at 06/11/17 0604   Gross per 24 hour   Intake             2140 ml   Output             2050 ml   Net               90 ml       Physical Exam:     General Appearance:    Alert, cooperative, in no acute distress   Lungs: "     Clear to auscultation bilaterally.      Heart:   Regular rhythm normal S1 and S2 there is an S4 no murmurs are appreciated.     Chest Wall:    No abnormalities observed   Abdomen:     Normal bowel sounds, no masses, no organomegaly, soft        non-tender, non-distended, no guarding, no rebound                tenderness   Extremities:   Moves all extremities well, no edema, no cyanosis, no             redness   Pulses:   Pulses palpable and equal bilaterally   Skin:   No bleeding, bruising or rash        Results Review:     Results from last 7 days  Lab Units 06/10/17  0518 06/09/17  1236   WBC 10*3/mm3 5.29 6.18   HEMOGLOBIN g/dL 10.6* 12.0*   HEMATOCRIT % 34.4* 38.7*   PLATELETS 10*3/mm3 222 258       Results from last 7 days  Lab Units 06/11/17  0528 06/10/17  0518 06/09/17  1236   SODIUM mmol/L 142 141 140   POTASSIUM mmol/L 3.6 3.0* 3.4*   CHLORIDE mmol/L 107 102 99   TOTAL CO2 mmol/L 33.0* 31.0 36.0*   BUN mg/dL 10 14 19   CREATININE mg/dL 1.00 1.20 1.60*   GLUCOSE mg/dL 80 78 118*                Results from last 7 days  Lab Units 06/09/17  1236   TSH mIU/mL 2.141           Results from last 7 days  Lab Units 06/11/17  0528 06/10/17  0518 06/09/17  1253   PROTIME Seconds 12.6* 12.2* 12.5*   INR  1.15 1.12 1.14       Results from last 7 days  Lab Units 06/09/17  1236   CK TOTAL U/L 102       Intake/Output Summary (Last 24 hours) at 06/11/17 0740  Last data filed at 06/11/17 0604   Gross per 24 hour   Intake             2140 ml   Output             2050 ml   Net               90 ml     Lab Results   Component Value Date    CKTOTAL 102 06/09/2017    CKMB 0.8 04/18/2016    CKMBINDEX  04/18/2016      Comment:      The relative index is statistically unreliable  if the CK is < or equal to 40 U/L.      TROPONINI 0.020 02/09/2017         I personally viewed and interpreted the patient's EKG/Telemetry/Laboratory data      EKG: no EKG to review today    Telemetry: A paced Ventricular rates from 60-76  bpm.    Ejection Fraction  No results found for: EF    Echo EF Estimated  Lab Results   Component Value Date    ECHOEFEST 50 06/01/2017         Present on Admission:  • Diabetes mellitus  • Dizziness  • Hypokalemia  • Hyperlipidemia  • History of PAF  • History of Ischemic cardiomyopathy. EF 30% in past, EF 50% on echo 6/1/17  • Acute kidney injury, likely prerenal from dehydration  • Vertigo, benign paroxysmal  • Elevated troponin I level, no evidence of ACS  • Hypertension  • H/O NSVT status post PPM/ICD  • Coronary artery disease with CABG in 2000, and 2016. Stent placed February 2017  • Complex partial epilepsy  • Arnold-Chiari malformation, type I    Assessment/Plan   1. Elevated troponin- trended down, no angina or anginal equivalent. No need for ischemic evaluation at this time  Echo: EF 50%: Resolving.  Clinically stable from cardiovascular standpoint.  2. PAF- currently A paced, CHADVASc = 7 on coumadin (started yesterday, switching from Eliquis), dosing per pharmacy; On amiodarone therapy.  3. STEPHANIE- related to dehydration from N/V. IV fluids, with improvement from 1.6 to 1.0 today potassium level improved as well  4. Severe vertigo- per medicine team, possible orthostasis vs BPPV, Norvac on hold. PT consult  5. CAD with previous CABG  6. ICM with previously placed (MDT) ICD, now EF 50%  7. Recent CVA    Used to see Dr. Pompa, but now retired, would like to follow with our group in [place]  Follow up with Dr. Rodriguez in [place] with MDT ICD check 1 month  INRs followed by PCP    KAN Hutton  06/11/17  7:40 AM      I, Duy Nicolas MD, personally performed the services face to face as described and documented by the above named individual. I have made any necessary edits and it is both accurate and complete 6/11/2017  10:21 AM

## 2017-06-11 NOTE — PROGRESS NOTES
"Pharmacy Consult  -  Warfarin    Pancho Bonner is a  77 y.o. male , pharmacy consulted for warfarin dosing. Patient is currently on Eliquis for history of DVT, started on last admission by cardiology. Patient would like to switch back to warfarin due to cost.     Patient admitted with complaints of neurological symptoms, CT negative, neuro has recommended to resume anticoagulation.     Height - 75\" (190.5 cm)  Weight - 174 lb 3.2 oz (79 kg)    Consulting Provider: - Dr. Zapata  Indication: - DVT/PE  Goal INR: -  2-3  Home Regimen:   - New start (Previously on 5mg daily prior to Eliquis)    Bridge Therapy: Enoxaparin 80mg SQ q12h    Drug-Drug Interactions with current regimen:  1. Amiodarone-increase INR   2. ASA-increase risk of bleeding  3. Lovenox-increase risk of bleeding     Warfarin Dosing During Admission:    Date  6/9 6/10 6/12         INR  1.14 1.12 1.15         Dose  7.5 mg 5 mg   5 mg + 2.5 mg x1 for a total dose of 7.5 mg               Labs:    Results from last 7 days   Lab Units 06/11/17  0528 06/10/17  0518 06/09/17  1253 06/09/17  1236   INR  1.15 1.12 1.14 --    HEMOGLOBIN g/dL --  10.6* --  12.0*   HEMATOCRIT % --  34.4* --  38.7*   PLATELETS 10*3/mm3 --  222 --  258   Results from last 7 days   Lab Units 06/11/17  0528 06/10/17  0518 06/09/17  1236   SODIUM mmol/L 142 141 140   POTASSIUM mmol/L 3.6 3.0* 3.4*   CHLORIDE mmol/L 107 102 99   TOTAL CO2 mmol/L 33.0* 31.0 36.0*   BUN mg/dL 10 14 19   CREATININE mg/dL 1.00 1.20 1.60*   CALCIUM mg/dL 9.0 9.0 10.1   BILIRUBIN mg/dL --  --  0.8   ALK PHOS U/L --  --  65   ALT (SGPT) U/L --  --  15   AST (SGOT) U/L --  --  20   GLUCOSE mg/dL 80 78 118*     Current dietary intake: 100% of intake     Assessment/Plan:   1. INR is sub-therapeutic today with a value of 1.15 vs 1.12 yesterday       -    INR remain almost the same after two dose of warfarin.         -    Taking into account the patient's CHADVASc (7) and current INR trend, pharmacy will " give an extra 2.5 mg x1 for tonight with a total dose of  7.5 mg today.      -     Renal function is improving with a creatinine of 1.00 and estimated CrCl of 60-70 ml/min, will continue Lovenox 80 mg SQ Q12h.       -     Warfarin education was completed on 6/10     2. Monitor for s/sx of bleeding/thrombosis          3. Daily PT/INR       Thank you,  Scot Roblero, PharmD.  6/11/2017  9:37 AM

## 2017-06-12 ENCOUNTER — APPOINTMENT (OUTPATIENT)
Dept: CARDIAC REHAB | Facility: HOSPITAL | Age: 77
End: 2017-06-12

## 2017-06-13 ENCOUNTER — APPOINTMENT (OUTPATIENT)
Dept: CARDIAC REHAB | Facility: HOSPITAL | Age: 77
End: 2017-06-13

## 2017-06-13 NOTE — DISCHARGE SUMMARY
HOSPITAL MEDICINE DISCHARGE SUMMARY    Date of Admission: 6/9/2017  Date of Discharge:  6/12/2017    Discharge Diagnoses:  Principal Problem:    Acute kidney injury, likely prerenal from dehydration  Active Problems:    Arnold-Chiari malformation, type I    Complex partial epilepsy    Diabetes mellitus    History of Ischemic cardiomyopathy. EF 30% in past, EF 50% on echo 6/1/17    Hyperlipidemia    Hypertension    H/O NSVT status post PPM/ICD    Coronary artery disease with CABG in 2000, and 2016. Stent placed February 2017    History of PAF    Dizziness    Hypokalemia    Vertigo, benign paroxysmal    Elevated troponin I level, no evidence of ACS    Vertigo      Presenting Problem/History of Present Illness  Dizziness [R42]  Dizziness [R42]  Vertigo [R42]  Patient is a 77 y.o. male presented with  past hx of CAD s/p CABG, non-sustained VTach with AICD, recent CVA for which he underwent mechanical thrombectomy and TPA 6/1/2017, T2DM, Seizure disorder, BPH, HTN, HLP, and Chronic LV Systolic/Diastolic HF with last LVEF 50% who presented to the [facility] ER on 6/9 with vertigo and gait instability. Patient reported that, on 6/8, he had a 4-5 hour episode of vertigo that started while he was seated and without clear precipitant. This was associated with nausea, vomiting, inability to stand, tinnitus, and headache. Episode was self-limited and has not recurred. Evaluation in the ER disclosed acute kidney injury, prompting admission:     Discharge Day HPI:  Sitting up in chair, NAD. Eager to go home. Wife in room. Denies having any more vertigo events, just mildly lightheaded if changes positions too quickly such as standing. No further n/v/d. Denies f/c, palpitations, SOA, or CP    Hospital Course    -Patient appears to have had benign paroxysmal positional vertigo that was self-limited. Will order as needed meclizine for home. Declined home health PT.   -Patient's acute kidney injury improved overnight with gentle IV  hydration.   -Bowel regimen for constipation  -I counseled patient and spouse extensively on etiology of vertigo and treatment  -Appreciate cardiology assistance, patient is without chest pain and without AICD firing. His troponin is stable from recent discharge and is likely a residual elevation in the setting of a recent stroke. No plan for inpatient ischemic evaluation. Will follow up with Dr. Rodriguez in [place] office in 1 month.   -Eliquis discontinued at admission per patient request, patient started on Lovenox bridge and warfarin at his preference for chronic anticoagulation. Indications for chronic anticoagulation include paroxysmal atrial fibrillation, and previous stroke with an apical cardiac thrombus. Will cont on Lovenox bridge for a minimum of 5 days after discharge, and instructed to obtain INR level at PCP office tomorrow, 6-12-17 who will cont to follow Coumadin therapy as previous.   Will be discharged home with wife today, needs to follow up with PCP in 1 week, in addition to having INR checked tomorrow.     Procedures Performed         Consults:   Consults     Date and Time Order Name Status Description    6/9/2017 1430 [facility] (on-call MD unless specified) Completed     6/1/2017 1026 Inpatient Consult to Cardiology Completed     6/1/2017 1026 Inpatient consult to Neuro Interventionalist Completed           Pertinent Test Results:   Lab Results (last 7 days)     Procedure Component Value Units Date/Time    Urinalysis With / Culture If Indicated [[id number]]  (Normal) Collected:  06/09/17 1236    Specimen:  Urine from Urine, Clean Catch Updated:  06/09/17 1256     Color, UA Yellow     Appearance, UA Clear     pH, UA 5.5     Specific Gravity, UA 1.010     Glucose, UA Negative     Ketones, UA Negative     Bilirubin, UA Negative     Blood, UA Negative     Protein, UA Negative     Leuk Esterase, UA Negative     Nitrite, UA Negative     Urobilinogen, UA 0.2 E.U./dL    Narrative:       Urine microscopic  not indicated.    POC Troponin, Rapid [[id number]]  (Abnormal) Collected:  06/09/17 1241    Specimen:  Blood Updated:  06/09/17 1300     Troponin I 1.24 (C) ng/mL       Serial Number: [id number]    : [id number]       TSH [[id number]]  (Normal) Collected:  06/09/17 1236    Specimen:  Blood Updated:  06/09/17 1315     TSH 2.141 mIU/mL     Comprehensive Metabolic Panel [[id number]]  (Abnormal) Collected:  06/09/17 1236    Specimen:  Blood Updated:  06/09/17 1315     Glucose 118 (H) mg/dL      BUN 19 mg/dL      Creatinine 1.60 (H) mg/dL      Sodium 140 mmol/L      Potassium 3.4 (L) mmol/L      Chloride 99 mmol/L      CO2 36.0 (H) mmol/L      Calcium 10.1 mg/dL      Total Protein 7.6 g/dL      Albumin 4.50 g/dL      ALT (SGPT) 15 U/L      AST (SGOT) 20 U/L      Alkaline Phosphatase 65 U/L      Total Bilirubin 0.8 mg/dL      eGFR Non African Amer 42 (L) mL/min/1.73      Globulin 3.1 gm/dL      A/G Ratio 1.5 g/dL      BUN/Creatinine Ratio 11.9     Anion Gap 5.0 mmol/L     Narrative:       National Kidney Foundation Guidelines    Stage     Description        GFR  1         Normal or High     90+  2         Mild decrease      60-89  3         Moderate decrease  30-59  4         Severe decrease    15-29  5         Kidney failure     <15    Protime-INR [[id number]]  (Abnormal) Collected:  06/09/17 1253    Specimen:  Blood Updated:  06/09/17 1316     Protime 12.5 (H) Seconds      INR 1.14    Narrative:       Therapeutic Ranges for INR: 2.0-3.0 (PT 20-30)                              2.5-3.5 (PT 25-34)    CBC & Differential [[id number]] Collected:  06/09/17 1236    Specimen:  Blood Updated:  06/09/17 1320    Narrative:       The following orders were created for panel order CBC & Differential.  Procedure                               Abnormality         Status                     ---------                               -----------         ------                     Scan Slide[[id number]]                   Normal              Final  result               CBC Auto Differential[[id number]]        Abnormal            Final result                 Please view results for these tests on the individual orders.    CBC Auto Differential [[id number]]  (Abnormal) Collected:  06/09/17 1236    Specimen:  Blood Updated:  06/09/17 1320     WBC 6.18 10*3/mm3      RBC 4.77 10*6/mm3      Hemoglobin 12.0 (L) g/dL      Hematocrit 38.7 (L) %      MCV 81.1 fL      MCH 25.2 (L) pg      MCHC 31.0 (L) g/dL      RDW 15.5 (H) %      RDW-SD 45.8 fl      MPV 10.3 fL      Platelets 258 10*3/mm3      Neutrophil % 67.7 %      Lymphocyte % 18.8 (L) %      Monocyte % 10.7 %      Eosinophil % 2.3 %      Basophil % 0.2 %      Immature Grans % 0.3 %      Neutrophils, Absolute 4.19 10*3/mm3      Lymphocytes, Absolute 1.16 10*3/mm3      Monocytes, Absolute 0.66 10*3/mm3      Eosinophils, Absolute 0.14 10*3/mm3      Basophils, Absolute 0.01 10*3/mm3      Immature Grans, Absolute 0.02 10*3/mm3     Scan Slide [[id number]]  (Normal) Collected:  06/09/17 1236    Specimen:  Blood Updated:  06/09/17 1320     RBC Morphology Normal     WBC Morphology Normal     Platelet Morphology Normal    CK [[id number]]  (Normal) Collected:  06/09/17 1236    Specimen:  Blood Updated:  06/09/17 1450     Creatine Kinase 102 U/L     Magnesium [[id number]]  (Normal) Collected:  06/09/17 1236    Specimen:  Blood Updated:  06/09/17 1450     Magnesium 1.9 mg/dL     POC Troponin, Rapid [[id number]]  (Abnormal) Collected:  06/09/17 1508    Specimen:  Blood Updated:  06/09/17 1525     Troponin I 1.27 (C) ng/mL       Serial Number: [id number]    : [id number]       POC Glucose Fingerstick [[id number]]  (Normal) Collected:  06/09/17 1727    Specimen:  Blood Updated:  06/09/17 1737     Glucose 83 mg/dL     Narrative:       Meter: [id number] : [id number] Daniel Walter    POC Glucose Fingerstick [[id number]]  (Normal) Collected:  06/09/17 2103    Specimen:  Blood Updated:  06/09/17 2104     Glucose 110 mg/dL     Narrative:        Meter: [id number] : [id number] Leisa Leon    CBC Auto Differential [[id number]]  (Abnormal) Collected:  06/10/17 0518    Specimen:  Blood Updated:  06/10/17 0626     WBC 5.29 10*3/mm3      RBC 4.16 (L) 10*6/mm3      Hemoglobin 10.6 (L) g/dL      Hematocrit 34.4 (L) %      MCV 82.7 fL      MCH 25.5 (L) pg      MCHC 30.8 (L) g/dL      RDW 15.8 (H) %      RDW-SD 47.9 fl      MPV 10.5 fL      Platelets 222 10*3/mm3      Neutrophil % 59.4 %      Lymphocyte % 23.1 (L) %      Monocyte % 12.7 (H) %      Eosinophil % 4.0 (H) %      Basophil % 0.4 %      Immature Grans % 0.4 %      Neutrophils, Absolute 3.15 10*3/mm3      Lymphocytes, Absolute 1.22 10*3/mm3      Monocytes, Absolute 0.67 10*3/mm3      Eosinophils, Absolute 0.21 10*3/mm3      Basophils, Absolute 0.02 10*3/mm3      Immature Grans, Absolute 0.02 10*3/mm3     Basic Metabolic Panel [[id number]]  (Abnormal) Collected:  06/10/17 0518    Specimen:  Blood Updated:  06/10/17 0633     Glucose 78 mg/dL      BUN 14 mg/dL      Creatinine 1.20 mg/dL      Sodium 141 mmol/L      Potassium 3.0 (L) mmol/L      Chloride 102 mmol/L      CO2 31.0 mmol/L      Calcium 9.0 mg/dL      eGFR Non African Amer 59 (L) mL/min/1.73      BUN/Creatinine Ratio 11.7     Anion Gap 8.0 mmol/L     Narrative:       National Kidney Foundation Guidelines    Stage     Description        GFR  1         Normal or High     90+  2         Mild decrease      60-89  3         Moderate decrease  30-59  4         Severe decrease    15-29  5         Kidney failure     <15    Protime-INR [[id number]]  (Abnormal) Collected:  06/10/17 0518    Specimen:  Blood Updated:  06/10/17 0640     Protime 12.2 (H) Seconds      INR 1.12    Narrative:       Therapeutic Ranges for INR: 2.0-3.0 (PT 20-30)                              2.5-3.5 (PT 25-34)    BNP [[id number]]  (Abnormal) Collected:  06/10/17 0518    Specimen:  Blood Updated:  06/10/17 0645     .0 (H) pg/mL     POC Glucose Fingerstick [[id number]]   (Normal) Collected:  06/10/17 0804    Specimen:  Blood Updated:  06/10/17 0806     Glucose 91 mg/dL     Narrative:       Meter: [id number] : [id number] Jayson Gallo    POC Glucose Fingerstick [[id number]]  (Abnormal) Collected:  06/10/17 1124    Specimen:  Blood Updated:  06/10/17 1126     Glucose 146 (H) mg/dL     Narrative:       Meter: [id number] : [id number] Milad Kelsey    POC Glucose Fingerstick [[id number]]  (Normal) Collected:  06/10/17 1712    Specimen:  Blood Updated:  06/10/17 1722     Glucose 83 mg/dL     Narrative:       Meter: [id number] : [id number] Jayson Holder    POC Glucose Fingerstick [[id number]]  (Normal) Collected:  06/10/17 2021    Specimen:  Blood Updated:  06/10/17 2022     Glucose 122 mg/dL     Narrative:       Meter: [id number] : [id number] Costella Procesa    Protime-INR [[id number]]  (Abnormal) Collected:  06/11/17 0528    Specimen:  Blood Updated:  06/11/17 0646     Protime 12.6 (H) Seconds      INR 1.15    Narrative:       Therapeutic Ranges for INR: 2.0-3.0 (PT 20-30)                              2.5-3.5 (PT 25-34)    Basic Metabolic Panel [[id number]]  (Abnormal) Collected:  06/11/17 0528    Specimen:  Blood Updated:  06/11/17 0706     Glucose 80 mg/dL      BUN 10 mg/dL      Creatinine 1.00 mg/dL      Sodium 142 mmol/L      Potassium 3.6 mmol/L      Chloride 107 mmol/L      CO2 33.0 (H) mmol/L      Calcium 9.0 mg/dL      eGFR Non African Amer 72 mL/min/1.73      BUN/Creatinine Ratio 10.0     Anion Gap 2.0 (L) mmol/L     Narrative:       National Kidney Foundation Guidelines    Stage     Description        GFR  1         Normal or High     90+  2         Mild decrease      60-89  3         Moderate decrease  30-59  4         Severe decrease    15-29  5         Kidney failure     <15    POC Glucose Fingerstick [[id number]]  (Normal) Collected:  06/11/17 0817    Specimen:  Blood Updated:  06/11/17 0818     Glucose 124 mg/dL     Narrative:       Meter: [id number] :  [id number] [facility]    POC Glucose Fingerstick [[id number]]  (Abnormal) Collected:  06/11/17 1121    Specimen:  Blood Updated:  06/11/17 1131     Glucose 138 (H) mg/dL     Narrative:       Meter: [id number] : [id number] Taina Cleveland        Imaging Results (all)     Procedure Component Value Units Date/Time    CT Head Without Contrast [[id number]] Collected:  06/09/17 1351     Updated:  06/09/17 1645    Narrative:       EXAMINATION: CT HEAD WITHOUT CONTRAST-06/09/2017:      INDICATION: Weakness, headache, code 19, change in neurologic status.     TECHNIQUE:  CT data set of the brain was performed without intravenous  contrast.     COMPARISON: Compared to previous studies 06/02/2017.     FINDINGS:   1.  There is marked small vessel microangiopathy with low attenuation in  the white matter and casie. This is chronic but impressive.  2.  The ocular lens are symmetrical and intact and the conal contents  are normal.  3.  There is marked calcification of the basal vessels of the brain from  atherosclerotic disease.  4.  Evidence of new or evolving infarct is not identified. Intra-axial  or extracerebral hemorrhage is not appreciated and there is no edema or  midline shift.       Impression:       1.  Negative CT data set of the brain for acute focal abnormality.  Evolving infarct, hemorrhage or edema is not identified.  2.  Widespread low attenuation microangiopathy is identified throughout  the white matter and casie, not changed from 06/02/2017.  3.  Data sets were complete at 12:20 PM and the report rendered  immediately to the emergency physician in attendance.     D:  06/09/2017  E:  06/09/2017           This report was finalized on 6/9/2017 4:43 PM by Dr. Shar Pascual MD.       XR Chest 1 View [[id number]] Collected:  06/09/17 1438     Updated:  06/09/17 1645    Narrative:       EXAMINATION: XR CHEST 1 VIEW-06/09/2017:      INDICATION: AMS.      COMPARISON: NONE.     FINDINGS:   1. Cardiomegaly is noted. A  "multilead ICD pacemaker defibrillator is in  place from the left.  2. There is elevation of right hemidiaphragm with blunting of right  costophrenic angle.  3. There are mild chronic changes in the chest but there is no edema,  free fluid or aspiration and no active disease is noted.           Impression:       Compared to one week ago, 06/02/2017, there has been no  change and there is no active disease.     D:  06/09/2017  E:  06/09/2017     This report was finalized on 6/9/2017 4:43 PM by Dr. Shar Pascual MD.               Physical Exam on Discharge:    BP (!) 123/112  Pulse 64  Temp 98 °F (36.7 °C) (Oral)   Resp 18  Ht 75\" (190.5 cm)  Wt 174 lb 3.2 oz (79 kg)  SpO2 96%  BMI 21.77 kg/m2  Gen-no acute distress  CV-RRR, S1 S2 normal, no m/r/g  Resp-CTAB, no wheezes  Abd-soft, NT, ND, +BS  Ext-no edema  Neuro-A&Ox3, no focal deficits  Psych-appropriate mood      Discharge Disposition  Home or Self Care    Discharge Medications   Pancho Bonner   Home Medication Instructions LIANA:[id number]    Printed on:06/12/17 2012   Medication Information                      ALPRAZolam (XANAX) 0.5 MG tablet  TAKE 1/2-1 TABLET BY MOUTH DAILY AS NEEDED             amiodarone (PACERONE) 200 MG tablet  Take 200 mg by mouth Daily.             amLODIPine (NORVASC) 10 MG tablet  Take 0.5 tablets by mouth Daily.             aspirin 325 MG tablet  Take 1 tablet by mouth Daily.             atorvastatin (LIPITOR) 40 MG tablet  Take 40 mg by mouth Every Night.             carvedilol (COREG) 6.25 MG tablet  Take 6.25 mg by mouth 2 (Two) Times a Day.             enoxaparin (LOVENOX) 80 MG/0.8ML solution syringe  Inject 0.8 mL under the skin Every 12 (Twelve) Hours for 5 days.             Ferrous Gluconate 325 (36 FE) MG tablet  TAKE 1 TABLET EVERY DAY             furosemide (LASIX) 40 MG tablet  Take 40 mg by mouth Daily.             lamoTRIgine (LaMICtal) 150 MG tablet  Take 150 mg by mouth 2 (Two) Times a Day.           "   meclizine (ANTIVERT) 12.5 MG tablet  Take 1 tablet by mouth 3 (Three) Times a Day As Needed for dizziness or Nausea.             metFORMIN (GLUCOPHAGE) 500 MG tablet  Take 500 mg by mouth 2 (Two) Times a Day.             pantoprazole (PROTONIX) 40 MG EC tablet  Take 40 mg by mouth Daily.             potassium chloride (KLOR-CON) 20 MEQ CR tablet  Take 20 mEq by mouth Daily.             warfarin (COUMADIN) 5 MG tablet  Take 5 mg starting tomorrow 6-12-17                 Discharge Diet   Diet Instructions     Diet: Regular, Cardiac, Consistent Carbohydrate; Thin Liquids, No Restrictions       Discharge Diet:   Regular  Cardiac  Consistent Carbohydrate      Fluid Consistency:  Thin Liquids, No Restrictions   Low Vitamin K                 Activity at Discharge      Follow-up Appointments  Future Appointments  Date Time Provider Department Center   6/26/2017 1:00 PM Wil Rodrigez MD [facility] None   7/6/2017 9:00 AM  [facility]   7/17/2017 1:30 PM Tariq Fall MD [facility] None   7/27/2017 10:00 AM Willis Martines MD [facility] None     Additional Instructions for the Follow-ups that You Need to Schedule     Discharge Follow-Up With Specified Provider    As directed    To:  Dr Rodriguez   Follow Up:  1 Month   Follow Up Details:  [place] office       Discharge Follow-up with Specialty    As directed    Specialty:  Dr Rodriguez   Follow Up:  1 Month   Follow Up Details:  in [place], with MDT ICD check                 Test Results Pending at Discharge      CC to: PCP     Time: 45 min

## 2017-06-14 ENCOUNTER — APPOINTMENT (OUTPATIENT)
Dept: CARDIAC REHAB | Facility: HOSPITAL | Age: 77
End: 2017-06-14

## 2017-06-15 ENCOUNTER — APPOINTMENT (OUTPATIENT)
Dept: CARDIAC REHAB | Facility: HOSPITAL | Age: 77
End: 2017-06-15

## 2017-06-15 NOTE — THERAPY DISCHARGE NOTE
Acute Care - Physical Therapy Discharge Summary  [facility]       Patient Name: Pancho Bonner  : 1940  MRN: [id number]    Today's Date: 6/15/2017  Onset of Illness/Injury or Date of Surgery Date: 17 (symptoms began  but were still present . To ED)    Date of Referral to PT: 17  Referring Physician: MADELEINE Ingram      Admit Date: 2017      PT Recommendation and Plan    Visit Dx:    ICD-10-CM ICD-9-CM   1. Dizziness R42 780.4   2. Nausea and vomiting, intractability of vomiting not specified, unspecified vomiting type R11.2 787.01   3. Acute renal failure, unspecified acute renal failure type N17.9 584.9   4. Hypokalemia E87.6 276.8   5. Ischemic cardiomyopathy I25.5 414.8   6. Elevated troponin R79.89 790.6   7. Poor tolerance for ambulation Z78.9 V49.89   8. Impaired functional mobility, balance, gait, and endurance Z74.09 V49.89                       IP PT Goals       06/10/17 1154          Transfer Training 2 PT LTG    Transfer Training PT 2 LTG, Date Established 06/10/17  -LS      Transfer Training PT 2 LTG, Time to Achieve 2 wks  -LS      Transfer Training PT 2 LTG, Activity Type sit to stand/stand to sit  -LS      Transfer Training PT 2 LTG, [place] Level independent  -LS      Transfer Training PT 2 LTG, Outcome goal ongoing  -LS      Gait Training PT LTG    Gait Training Goal PT LTG, Date Established 06/10/17  -LS      Gait Training Goal PT LTG, Time to Achieve 2 wks  -LS      Gait Training Goal PT LTG, [place] Level independent  -LS      Gait Training Goal PT LTG, Distance to Achieve 500  -LS      Gait Training Goal PT LTG, Outcome goal ongoing  -LS      Stair Training PT LTG    Stair Training Goal PT LTG, Date Established 06/10/17  -LS      Stair Training Goal PT LTG, Time to Achieve 2 wks  -LS      Stair Training Goal PT LTG, [place] Level independent  -LS      Stair Training Goal PT LTG, Assist Device 1 handrail  -LS      Stair Training Goal PT  LTG, Distance to Achieve 10  -LS      Stair Training Goal PT LTG, Outcome goal ongoing  -LS        User Key  (r) = Recorded By, (t) = Taken By, (c) = Cosigned By    Initials Name Provider Type    SHIRA Leblanc, PT Physical Therapist            Goals Status: Treatment plan discontinued secondary to discharge from acute facility.  PT Discharge Summary  Reason for Discharge: Discharge from facility  Outcomes Achieved: Refer to plan of care for updates on goals achieved  Discharge Destination: Home with assist      Marifer Forman, PT   6/15/2017

## 2017-06-16 ENCOUNTER — APPOINTMENT (OUTPATIENT)
Dept: CARDIAC REHAB | Facility: HOSPITAL | Age: 77
End: 2017-06-16

## 2017-06-19 ENCOUNTER — APPOINTMENT (OUTPATIENT)
Dept: CARDIAC REHAB | Facility: HOSPITAL | Age: 77
End: 2017-06-19

## 2017-06-20 ENCOUNTER — APPOINTMENT (OUTPATIENT)
Dept: CARDIAC REHAB | Facility: HOSPITAL | Age: 77
End: 2017-06-20

## 2017-06-21 ENCOUNTER — APPOINTMENT (OUTPATIENT)
Dept: CARDIAC REHAB | Facility: HOSPITAL | Age: 77
End: 2017-06-21

## 2017-06-22 ENCOUNTER — APPOINTMENT (OUTPATIENT)
Dept: CARDIAC REHAB | Facility: HOSPITAL | Age: 77
End: 2017-06-22

## 2017-06-23 ENCOUNTER — APPOINTMENT (OUTPATIENT)
Dept: CARDIAC REHAB | Facility: HOSPITAL | Age: 77
End: 2017-06-23

## 2017-06-26 ENCOUNTER — OFFICE VISIT (OUTPATIENT)
Dept: NEUROSURGERY | Facility: CLINIC | Age: 77
End: 2017-06-26

## 2017-06-26 ENCOUNTER — APPOINTMENT (OUTPATIENT)
Dept: CARDIAC REHAB | Facility: HOSPITAL | Age: 77
End: 2017-06-26

## 2017-06-26 VITALS
SYSTOLIC BLOOD PRESSURE: 132 MMHG | HEIGHT: 75 IN | WEIGHT: 179 LBS | TEMPERATURE: 96 F | DIASTOLIC BLOOD PRESSURE: 80 MMHG | BODY MASS INDEX: 22.26 KG/M2

## 2017-06-26 DIAGNOSIS — Z86.73 H/O: CVA (CEREBROVASCULAR ACCIDENT): Primary | ICD-10-CM

## 2017-06-26 PROCEDURE — 99212 OFFICE O/P EST SF 10 MIN: CPT | Performed by: NEUROLOGICAL SURGERY

## 2017-06-26 NOTE — PROGRESS NOTES
NAME: ROCK RAMIREZ   DOS: 2017  : 1940  PCP: Shar Obregon MD    Chief Complaint:  Follow-up mechanical thrombectomy  Chief Complaint   Patient presents with   • Joint Pain     follow up       History of Present Illness:  77 y.o. male   This is a 77-year-old with a history of a wakeup stroke with resultant weakness and a aphasia he went emergently to the Cath Lab and is here for follow-up he is doing well.  He is currently on Coumadin.    PMHX  Allergies:  Allergies   Allergen Reactions   • Latex Rash     Medications    Current Outpatient Prescriptions:   •  ALPRAZolam (XANAX) 0.5 MG tablet, TAKE 1/2-1 TABLET BY MOUTH DAILY AS NEEDED, Disp: , Rfl: 0  •  amiodarone (PACERONE) 200 MG tablet, Take 200 mg by mouth Daily., Disp: , Rfl:   •  amLODIPine (NORVASC) 10 MG tablet, Take 0.5 tablets by mouth Daily., Disp: , Rfl:   •  aspirin 325 MG tablet, Take 1 tablet by mouth Daily., Disp: 30 tablet, Rfl: 3  •  atorvastatin (LIPITOR) 40 MG tablet, Take 40 mg by mouth Every Night., Disp: , Rfl:   •  carvedilol (COREG) 6.25 MG tablet, Take 6.25 mg by mouth 2 (Two) Times a Day., Disp: , Rfl:   •  Ferrous Gluconate 325 (36 FE) MG tablet, TAKE 1 TABLET EVERY DAY, Disp: , Rfl: 1  •  furosemide (LASIX) 40 MG tablet, Take 40 mg by mouth Daily., Disp: , Rfl:   •  lamoTRIgine (LaMICtal) 150 MG tablet, Take 150 mg by mouth 2 (Two) Times a Day., Disp: , Rfl:   •  meclizine (ANTIVERT) 12.5 MG tablet, Take 1 tablet by mouth 3 (Three) Times a Day As Needed for dizziness or Nausea., Disp: 15 tablet, Rfl: 0  •  metFORMIN (GLUCOPHAGE) 500 MG tablet, Take 500 mg by mouth 2 (Two) Times a Day., Disp: , Rfl:   •  pantoprazole (PROTONIX) 40 MG EC tablet, Take 40 mg by mouth Daily., Disp: , Rfl:   •  potassium chloride (KLOR-CON) 20 MEQ CR tablet, Take 20 mEq by mouth Daily., Disp: , Rfl:   •  warfarin (COUMADIN) 5 MG tablet, Take 5 mg starting tomorrow 617, Disp: , Rfl:   Past Medical History:  Past Medical History:    Diagnosis Date   • Anxiety    • Arnold-Chiari malformation, type I     followed by Dr. Martinez, but last note available was 2014   • BPH (benign prostatic hyperplasia)    • Chronic Pleural effusion on right    • Complex partial seizure     followed by Dr. Martinez, but last note available was 2014   • Congestive heart failure     follows with Dr. Pompa at [facility], EF 30%   • Coronary artery disease     on ASA/plavix   • CVA (cerebral vascular accident)     apical thrombus on chronic coumadin   • Diabetes mellitus    • ED (erectile dysfunction)    • GERD (gastroesophageal reflux disease)    • Hyperlipidemia    • Hypertension    • Ischemic cardiomyopathy    • Myocardial infarction    • Non-sustained ventricular tachycardia      Past Surgical History:  Past Surgical History:   Procedure Laterality Date   • CARDIAC CATHETERIZATION  02/2016    [facility], 3 vessel disease, patent LIMA to LAD bypass graft    • CARDIAC CATHETERIZATION  05/25/2017    [facility], severe 3 vessel disease, patent LIMA to LAD bypass graft, recommend medical management    • CARDIAC DEFIBRILLATOR PLACEMENT  08/2009    w/ PPM Medtronic (DDD)   • COLONOSCOPY W/ BIOPSIES     • CORONARY ANGIOPLASTY WITH STENT PLACEMENT     • CORONARY ARTERY BYPASS GRAFT  2000    [facility]   • INSERT / REPLACE / REMOVE PACEMAKER  05/26/2017    Medtronic generator change, Dr. Ho, [facility]    • INTERVENTIONAL RADIOLOGY PROCEDURE Bilateral 6/1/2017    Procedure: Carotid Cerebral Angiogram;  Surgeon: Wil Rodrigez MD;  Location: [facility] INVASIVE LOCATION;  Service:      Social Hx:  Social History   Substance Use Topics   • Smoking status: Never Smoker   • Smokeless tobacco: Never Used   • Alcohol use No     Family Hx:  Family History   Problem Relation Age of Onset   • Diabetes Mother    • Hypertension Mother    • Heart disease Mother    • Heart disease Father    • Stroke Father    • Heart disease Sister    • Diabetes Other    • Heart disease Other    • Hypertension Other    • Stroke  Other      Review of Systems:        Review of Systems   Constitutional: Positive for chills. Negative for activity change, appetite change, diaphoresis, fatigue, fever and unexpected weight change.   HENT: Positive for sneezing. Negative for congestion, dental problem, drooling, ear discharge, ear pain, facial swelling, hearing loss, mouth sores, nosebleeds, postnasal drip, rhinorrhea, sinus pressure, sore throat, tinnitus, trouble swallowing and voice change.    Eyes: Negative for photophobia, pain, discharge, redness, itching and visual disturbance.   Respiratory: Negative for apnea, cough, choking, chest tightness, shortness of breath, wheezing and stridor.    Cardiovascular: Negative for chest pain, palpitations and leg swelling.   Gastrointestinal: Negative for abdominal distention, abdominal pain, anal bleeding, blood in stool, constipation, diarrhea, nausea, rectal pain and vomiting.   Endocrine: Negative for cold intolerance, heat intolerance, polydipsia, polyphagia and polyuria.   Genitourinary: Negative for decreased urine volume, difficulty urinating, dysuria, enuresis, flank pain, frequency, genital sores, hematuria and urgency.   Musculoskeletal: Positive for arthralgias. Negative for back pain, gait problem, joint swelling, myalgias, neck pain and neck stiffness.   Skin: Negative for color change, pallor, rash and wound.   Allergic/Immunologic: Negative for environmental allergies, food allergies and immunocompromised state.   Neurological: Positive for dizziness. Negative for tremors, seizures, syncope, facial asymmetry, speech difficulty, weakness, light-headedness, numbness and headaches.   Hematological: Negative for adenopathy. Bruises/bleeds easily.   Psychiatric/Behavioral: Positive for confusion. Negative for agitation, behavioral problems, decreased concentration, dysphoric mood, hallucinations, self-injury, sleep disturbance and suicidal ideas. The patient is not nervous/anxious and is not  hyperactive.    All other systems reviewed and are negative.           Physical Examination:  Vitals:    06/26/17 1330   Temp: 96 °F (35.6 °C)      General Appearance:   Well developed, well nourished, well groomed, alert, and cooperative.  Neurological examination:  Neurologic Exam  Awake alert follows commands minimal expressive speech issues no motor drift can walk without assistance    Review of Imaging/DATA:  No imaging  Diagnoses/Plan:    Mr. Bonner is a 77 y.o. male   This was just a quick follow-up after stroke mechanical thrombectomy he is doing externally well he has no evidence of residual deficits, except for a bit of mental fatigue and expressive speech issues.  He moves his extremities well his modified Renken scale is 1.  Overall doing well I will see him back as needed

## 2017-06-27 ENCOUNTER — APPOINTMENT (OUTPATIENT)
Dept: CARDIAC REHAB | Facility: HOSPITAL | Age: 77
End: 2017-06-27

## 2017-06-28 ENCOUNTER — APPOINTMENT (OUTPATIENT)
Dept: CARDIAC REHAB | Facility: HOSPITAL | Age: 77
End: 2017-06-28

## 2017-06-29 ENCOUNTER — APPOINTMENT (OUTPATIENT)
Dept: CARDIAC REHAB | Facility: HOSPITAL | Age: 77
End: 2017-06-29

## 2017-06-30 ENCOUNTER — APPOINTMENT (OUTPATIENT)
Dept: CARDIAC REHAB | Facility: HOSPITAL | Age: 77
End: 2017-06-30

## 2017-07-03 ENCOUNTER — APPOINTMENT (OUTPATIENT)
Dept: CARDIAC REHAB | Facility: HOSPITAL | Age: 77
End: 2017-07-03

## 2017-07-05 ENCOUNTER — APPOINTMENT (OUTPATIENT)
Dept: CARDIAC REHAB | Facility: HOSPITAL | Age: 77
End: 2017-07-05

## 2017-07-06 ENCOUNTER — APPOINTMENT (OUTPATIENT)
Dept: CARDIAC REHAB | Facility: HOSPITAL | Age: 77
End: 2017-07-06

## 2017-07-06 ENCOUNTER — TREATMENT (OUTPATIENT)
Dept: CARDIAC REHAB | Facility: HOSPITAL | Age: 77
End: 2017-07-06

## 2017-07-06 DIAGNOSIS — I21.4 NSTEMI (NON-ST ELEVATED MYOCARDIAL INFARCTION) (HCC): Primary | ICD-10-CM

## 2017-07-06 PROCEDURE — 93797 PHYS/QHP OP CAR RHAB WO ECG: CPT

## 2017-07-06 PROCEDURE — 93798 PHYS/QHP OP CAR RHAB W/ECG: CPT

## 2017-07-06 RX ORDER — NITROGLYCERIN 0.4 MG/1
0.4 TABLET SUBLINGUAL
COMMUNITY

## 2017-07-06 RX ORDER — LISINOPRIL 20 MG/1
30 TABLET ORAL 2 TIMES DAILY
Status: ON HOLD | COMMUNITY
End: 2020-07-04

## 2017-07-06 RX ORDER — PAROXETINE 10 MG/1
5 TABLET, FILM COATED ORAL EVERY MORNING
Status: ON HOLD | COMMUNITY
End: 2020-07-07

## 2017-07-06 NOTE — PROGRESS NOTES
Pt was seen today in CR for a Phase 2 visit.  Vital signs and session notes recorded in [employer] and will be scanned into Epic by HIM.

## 2017-07-07 ENCOUNTER — APPOINTMENT (OUTPATIENT)
Dept: CARDIAC REHAB | Facility: HOSPITAL | Age: 77
End: 2017-07-07

## 2017-07-10 ENCOUNTER — TREATMENT (OUTPATIENT)
Dept: CARDIAC REHAB | Facility: HOSPITAL | Age: 77
End: 2017-07-10

## 2017-07-10 ENCOUNTER — APPOINTMENT (OUTPATIENT)
Dept: CARDIAC REHAB | Facility: HOSPITAL | Age: 77
End: 2017-07-10

## 2017-07-10 DIAGNOSIS — I21.4 NSTEMI (NON-ST ELEVATED MYOCARDIAL INFARCTION) (HCC): Primary | ICD-10-CM

## 2017-07-10 PROCEDURE — 93798 PHYS/QHP OP CAR RHAB W/ECG: CPT

## 2017-07-10 NOTE — PROGRESS NOTES
Pt was seen today in CR for a Phase 2 visit.  Vital signs and session notes recorded in [employer] and will be scanned into Epic by HIM.

## 2017-07-11 ENCOUNTER — APPOINTMENT (OUTPATIENT)
Dept: CARDIAC REHAB | Facility: HOSPITAL | Age: 77
End: 2017-07-11

## 2017-07-12 ENCOUNTER — APPOINTMENT (OUTPATIENT)
Dept: CARDIAC REHAB | Facility: HOSPITAL | Age: 77
End: 2017-07-12

## 2017-07-12 ENCOUNTER — TREATMENT (OUTPATIENT)
Dept: CARDIAC REHAB | Facility: HOSPITAL | Age: 77
End: 2017-07-12

## 2017-07-12 DIAGNOSIS — I21.4 NSTEMI (NON-ST ELEVATED MYOCARDIAL INFARCTION) (HCC): Primary | ICD-10-CM

## 2017-07-12 PROCEDURE — 93798 PHYS/QHP OP CAR RHAB W/ECG: CPT

## 2017-07-12 NOTE — PROGRESS NOTES
Pt was seen today in CR for a Phase 2 visit.  Vital signs and session notes recorded in [employer] and will be scanned into Epic by HIM.

## 2017-07-13 ENCOUNTER — APPOINTMENT (OUTPATIENT)
Dept: CARDIAC REHAB | Facility: HOSPITAL | Age: 77
End: 2017-07-13

## 2017-07-14 ENCOUNTER — APPOINTMENT (OUTPATIENT)
Dept: CARDIAC REHAB | Facility: HOSPITAL | Age: 77
End: 2017-07-14

## 2017-07-14 ENCOUNTER — TREATMENT (OUTPATIENT)
Dept: CARDIAC REHAB | Facility: HOSPITAL | Age: 77
End: 2017-07-14

## 2017-07-14 DIAGNOSIS — I21.4 NSTEMI (NON-ST ELEVATED MYOCARDIAL INFARCTION) (HCC): Primary | ICD-10-CM

## 2017-07-14 PROCEDURE — 93798 PHYS/QHP OP CAR RHAB W/ECG: CPT

## 2017-07-17 ENCOUNTER — APPOINTMENT (OUTPATIENT)
Dept: CARDIAC REHAB | Facility: HOSPITAL | Age: 77
End: 2017-07-17

## 2017-07-17 ENCOUNTER — TREATMENT (OUTPATIENT)
Dept: CARDIAC REHAB | Facility: HOSPITAL | Age: 77
End: 2017-07-17

## 2017-07-17 DIAGNOSIS — I21.9 ACUTE MYOCARDIAL INFARCTION, UNSPECIFIED SITE, INITIAL EPISODE OF CARE: Primary | ICD-10-CM

## 2017-07-17 PROCEDURE — 93798 PHYS/QHP OP CAR RHAB W/ECG: CPT

## 2017-07-17 NOTE — PROGRESS NOTES
Pt was seen today in CR for a Phase 2 visit.  Vital signs and session notes recorded in [employer] and will be scanned into Epic by HIM.

## 2017-07-18 ENCOUNTER — APPOINTMENT (OUTPATIENT)
Dept: CARDIAC REHAB | Facility: HOSPITAL | Age: 77
End: 2017-07-18

## 2017-07-19 ENCOUNTER — TREATMENT (OUTPATIENT)
Dept: CARDIAC REHAB | Facility: HOSPITAL | Age: 77
End: 2017-07-19

## 2017-07-19 ENCOUNTER — APPOINTMENT (OUTPATIENT)
Dept: CARDIAC REHAB | Facility: HOSPITAL | Age: 77
End: 2017-07-19

## 2017-07-19 DIAGNOSIS — I21.9 ACUTE MYOCARDIAL INFARCTION, UNSPECIFIED SITE, INITIAL EPISODE OF CARE: Primary | ICD-10-CM

## 2017-07-19 PROCEDURE — 93798 PHYS/QHP OP CAR RHAB W/ECG: CPT

## 2017-07-20 ENCOUNTER — APPOINTMENT (OUTPATIENT)
Dept: CARDIAC REHAB | Facility: HOSPITAL | Age: 77
End: 2017-07-20

## 2017-07-21 ENCOUNTER — APPOINTMENT (OUTPATIENT)
Dept: CARDIAC REHAB | Facility: HOSPITAL | Age: 77
End: 2017-07-21

## 2017-07-21 ENCOUNTER — TREATMENT (OUTPATIENT)
Dept: CARDIAC REHAB | Facility: HOSPITAL | Age: 77
End: 2017-07-21

## 2017-07-21 DIAGNOSIS — I21.9 ACUTE MYOCARDIAL INFARCTION, UNSPECIFIED SITE, INITIAL EPISODE OF CARE: Primary | ICD-10-CM

## 2017-07-21 PROCEDURE — 93798 PHYS/QHP OP CAR RHAB W/ECG: CPT

## 2017-07-24 ENCOUNTER — LAB (OUTPATIENT)
Dept: UROLOGY | Facility: CLINIC | Age: 77
End: 2017-07-24

## 2017-07-24 ENCOUNTER — APPOINTMENT (OUTPATIENT)
Dept: CARDIAC REHAB | Facility: HOSPITAL | Age: 77
End: 2017-07-24

## 2017-07-24 ENCOUNTER — TREATMENT (OUTPATIENT)
Dept: CARDIAC REHAB | Facility: HOSPITAL | Age: 77
End: 2017-07-24

## 2017-07-24 DIAGNOSIS — I21.9 ACUTE MYOCARDIAL INFARCTION, UNSPECIFIED SITE, INITIAL EPISODE OF CARE: Primary | ICD-10-CM

## 2017-07-24 DIAGNOSIS — N40.1 BPH (BENIGN PROSTATIC HYPERTROPHY) WITH URINARY OBSTRUCTION: Primary | ICD-10-CM

## 2017-07-24 DIAGNOSIS — N13.8 BPH (BENIGN PROSTATIC HYPERTROPHY) WITH URINARY OBSTRUCTION: Primary | ICD-10-CM

## 2017-07-24 LAB — PSA SERPL-MCNC: 1.02 NG/ML (ref 0.06–4)

## 2017-07-24 PROCEDURE — 93798 PHYS/QHP OP CAR RHAB W/ECG: CPT

## 2017-07-25 ENCOUNTER — APPOINTMENT (OUTPATIENT)
Dept: CARDIAC REHAB | Facility: HOSPITAL | Age: 77
End: 2017-07-25

## 2017-07-26 ENCOUNTER — APPOINTMENT (OUTPATIENT)
Dept: CARDIAC REHAB | Facility: HOSPITAL | Age: 77
End: 2017-07-26

## 2017-07-26 ENCOUNTER — TREATMENT (OUTPATIENT)
Dept: CARDIAC REHAB | Facility: HOSPITAL | Age: 77
End: 2017-07-26

## 2017-07-26 DIAGNOSIS — I21.9 ACUTE MYOCARDIAL INFARCTION, UNSPECIFIED SITE, INITIAL EPISODE OF CARE: Primary | ICD-10-CM

## 2017-07-26 PROCEDURE — 93798 PHYS/QHP OP CAR RHAB W/ECG: CPT

## 2017-07-27 ENCOUNTER — APPOINTMENT (OUTPATIENT)
Dept: CARDIAC REHAB | Facility: HOSPITAL | Age: 77
End: 2017-07-27

## 2017-07-27 ENCOUNTER — OFFICE VISIT (OUTPATIENT)
Dept: UROLOGY | Facility: CLINIC | Age: 77
End: 2017-07-27

## 2017-07-27 VITALS
HEART RATE: 75 BPM | HEIGHT: 75 IN | WEIGHT: 179 LBS | BODY MASS INDEX: 22.26 KG/M2 | SYSTOLIC BLOOD PRESSURE: 122 MMHG | DIASTOLIC BLOOD PRESSURE: 68 MMHG | TEMPERATURE: 98.5 F | OXYGEN SATURATION: 97 %

## 2017-07-27 DIAGNOSIS — N40.0 BENIGN NON-NODULAR PROSTATIC HYPERPLASIA, PRESENCE OF LOWER URINARY TRACT SYMPTOMS UNSPECIFIED: Primary | ICD-10-CM

## 2017-07-27 LAB
BILIRUB BLD-MCNC: NEGATIVE MG/DL
CLARITY, POC: CLEAR
COLOR UR: YELLOW
GLUCOSE UR STRIP-MCNC: NEGATIVE MG/DL
KETONES UR QL: NEGATIVE
LEUKOCYTE EST, POC: NEGATIVE
NITRITE UR-MCNC: NEGATIVE MG/ML
PH UR: 6 [PH] (ref 5–8)
PROT UR STRIP-MCNC: NEGATIVE MG/DL
RBC # UR STRIP: NEGATIVE /UL
SP GR UR: 1.02 (ref 1–1.03)
UROBILINOGEN UR QL: NORMAL

## 2017-07-27 PROCEDURE — 81003 URINALYSIS AUTO W/O SCOPE: CPT | Performed by: UROLOGY

## 2017-07-27 PROCEDURE — 99213 OFFICE O/P EST LOW 20 MIN: CPT | Performed by: UROLOGY

## 2017-07-27 NOTE — PROGRESS NOTES
Chief Complaint  Benign Prostatic Hypertrophy (yearly)        NEVA Bonner is a 77 y.o. male who returns today for annual checkup originally referred for erectile dysfunction.  The most common cause of this is atherosclerosis and diabetes which he has.  He states in the past year since I saw him he's had 2 heart attacks and a stroke with residual expressive aphasia.  He is currently taking aspirin Plavix and Coumadin and a recent episode of a minor cut that wouldn't stop bleeding when he was found to have an INR over 5.  His urine today is clear but he's instructed to discontinue the blood thinner anytime he has gross hematuria until he comes in for an INR.    Vitals:    07/27/17 1016   BP: 122/68   Pulse: 75   Temp: 98.5 °F (36.9 °C)   SpO2: 97%       Past Medical History  Past Medical History:   Diagnosis Date   • Anxiety    • Arnold-Chiari malformation, type I     followed by Dr. Martinez, but last note available was 2014   • BPH (benign prostatic hyperplasia)    • Chronic Pleural effusion on right    • Complex partial seizure     followed by Dr. Martinez, but last note available was 2014   • Congestive heart failure     follows with Dr. Pompa at [facility], EF 30%   • Coronary artery disease     on ASA/plavix   • CVA (cerebral vascular accident)     apical thrombus on chronic coumadin   • Diabetes mellitus    • ED (erectile dysfunction)    • GERD (gastroesophageal reflux disease)    • Hyperlipidemia    • Hypertension    • Ischemic cardiomyopathy    • Myocardial infarction    • Non-sustained ventricular tachycardia        Past Surgical History  Past Surgical History:   Procedure Laterality Date   • CARDIAC CATHETERIZATION  02/2016    [facility], 3 vessel disease, patent LIMA to LAD bypass graft    • CARDIAC CATHETERIZATION  05/25/2017    [facility], severe 3 vessel disease, patent LIMA to LAD bypass graft, recommend medical management    • CARDIAC DEFIBRILLATOR PLACEMENT  08/2009    w/ PPM Medtronic (DDD)   • COLONOSCOPY W/ BIOPSIES      • CORONARY ANGIOPLASTY WITH STENT PLACEMENT     • CORONARY ARTERY BYPASS GRAFT  2000    [facility]   • INSERT / REPLACE / REMOVE PACEMAKER  05/26/2017    Medtronic generator change, Dr. Ho, [facility]    • INTERVENTIONAL RADIOLOGY PROCEDURE Bilateral 6/1/2017    Procedure: Carotid Cerebral Angiogram;  Surgeon: Wil Rodrigez MD;  Location: [facility] INVASIVE LOCATION;  Service:        Medications  has a current medication list which includes the following prescription(s): alprazolam, amiodarone, amlodipine, aspirin, atorvastatin, carvedilol, ferrous gluconate, furosemide, lamotrigine, lisinopril, meclizine, metformin, nitroglycerin, pantoprazole, paroxetine, potassium chloride, and warfarin.      Allergies  Allergies   Allergen Reactions   • Latex Rash       Social History  Social History     Social History Narrative       Family History  He has no family history of bladder or kidney cancer  He has no family history of kidney stones      AUA Symptom Score:      Review of Systems  Review of Systems    Physical Exam  Physical Exam   Constitutional: He is oriented to person, place, and time. He appears well-developed and well-nourished.   HENT:   Head: Normocephalic and atraumatic.   Neck: Normal range of motion.   Pulmonary/Chest: Effort normal. No respiratory distress.   Abdominal: Soft. He exhibits no distension and no mass. There is no tenderness. No hernia.   Genitourinary: Rectum normal and prostate normal.   Musculoskeletal: Normal range of motion.   Lymphadenopathy:     He has no cervical adenopathy.   Neurological: He is alert and oriented to person, place, and time.   Skin: Skin is warm and dry.   Psychiatric: He has a normal mood and affect. His behavior is normal.   Vitals reviewed.      Labs Recent and today in the office:  Results for orders placed or performed in visit on 07/27/17   POC Urinalysis Dipstick, Automated   Result Value Ref Range    Color Yellow Yellow, Straw, Dark Yellow, Kenna    Clarity, UA  Clear Clear    Glucose, UA Negative Negative, 1000 mg/dL (3+) mg/dL    Bilirubin Negative Negative    Ketones, UA Negative Negative    Specific Gravity  1.025 1.005 - 1.030    Blood, UA Negative Negative    pH, Urine 6.0 5.0 - 8.0    Protein, POC Negative Negative mg/dL    Urobilinogen, UA Normal Normal    Leukocytes Negative Negative    Nitrite, UA Negative Negative         Assessment & Plan  With a normal digital rectal exam and a PSA of 1 his prostate is a minor issue and he can return when necessary for any difficulty voiding.

## 2017-07-28 ENCOUNTER — APPOINTMENT (OUTPATIENT)
Dept: CARDIAC REHAB | Facility: HOSPITAL | Age: 77
End: 2017-07-28

## 2017-07-28 ENCOUNTER — TREATMENT (OUTPATIENT)
Dept: CARDIAC REHAB | Facility: HOSPITAL | Age: 77
End: 2017-07-28

## 2017-07-28 DIAGNOSIS — I21.9 ACUTE MYOCARDIAL INFARCTION, UNSPECIFIED SITE, INITIAL EPISODE OF CARE: Primary | ICD-10-CM

## 2017-07-28 PROCEDURE — 93798 PHYS/QHP OP CAR RHAB W/ECG: CPT

## 2017-07-31 ENCOUNTER — APPOINTMENT (OUTPATIENT)
Dept: CARDIAC REHAB | Facility: HOSPITAL | Age: 77
End: 2017-07-31

## 2017-07-31 ENCOUNTER — TREATMENT (OUTPATIENT)
Dept: CARDIAC REHAB | Facility: HOSPITAL | Age: 77
End: 2017-07-31

## 2017-07-31 DIAGNOSIS — I21.4 NSTEMI (NON-ST ELEVATED MYOCARDIAL INFARCTION) (HCC): Primary | ICD-10-CM

## 2017-07-31 PROCEDURE — 93798 PHYS/QHP OP CAR RHAB W/ECG: CPT

## 2017-07-31 NOTE — PROGRESS NOTES
Pt was seen today in CR for a Phase 2 visit.  Vital signs and session notes recorded in [employer] and will be scanned into Epic by HIM.

## 2017-07-31 NOTE — PROGRESS NOTES
Patient was on my list, on my brief exam He is doing fine and back to base level , Discussed with cardio recommended aspirin 81 mg, Eliquis and Lasix 40 mg qdaily, Patient is ready for discharge and family wants to go , Discussed with Dr Hamm per whom Dr Rodrigez is primary , will Page and discuss for discharge process   Vahe Mojica MD     Initiate Treatment: Start riverchase gly sal 5/2 cleanser daily to wash face, clindagel in the a.m.  To face Samples Given: Clindagel Detail Level: Zone

## 2017-08-01 ENCOUNTER — APPOINTMENT (OUTPATIENT)
Dept: CARDIAC REHAB | Facility: HOSPITAL | Age: 77
End: 2017-08-01

## 2017-08-02 ENCOUNTER — APPOINTMENT (OUTPATIENT)
Dept: CARDIAC REHAB | Facility: HOSPITAL | Age: 77
End: 2017-08-02

## 2017-08-02 ENCOUNTER — TREATMENT (OUTPATIENT)
Dept: CARDIAC REHAB | Facility: HOSPITAL | Age: 77
End: 2017-08-02

## 2017-08-02 DIAGNOSIS — I21.4 NSTEMI (NON-ST ELEVATED MYOCARDIAL INFARCTION) (HCC): Primary | ICD-10-CM

## 2017-08-02 DIAGNOSIS — I21.9 ACUTE MYOCARDIAL INFARCTION, UNSPECIFIED SITE, INITIAL EPISODE OF CARE: ICD-10-CM

## 2017-08-02 PROCEDURE — 93798 PHYS/QHP OP CAR RHAB W/ECG: CPT

## 2017-08-02 NOTE — PROGRESS NOTES
Pt was seen today in CR for a Phase 2 visit.  Vital signs and session notes recorded in [employer] and will be scanned into Epic by HIM.

## 2017-08-03 ENCOUNTER — APPOINTMENT (OUTPATIENT)
Dept: CARDIAC REHAB | Facility: HOSPITAL | Age: 77
End: 2017-08-03

## 2017-08-04 ENCOUNTER — TREATMENT (OUTPATIENT)
Dept: CARDIAC REHAB | Facility: HOSPITAL | Age: 77
End: 2017-08-04

## 2017-08-04 ENCOUNTER — APPOINTMENT (OUTPATIENT)
Dept: CARDIAC REHAB | Facility: HOSPITAL | Age: 77
End: 2017-08-04

## 2017-08-04 DIAGNOSIS — I21.4 NSTEMI (NON-ST ELEVATED MYOCARDIAL INFARCTION) (HCC): Primary | ICD-10-CM

## 2017-08-04 PROCEDURE — 93798 PHYS/QHP OP CAR RHAB W/ECG: CPT

## 2017-08-04 NOTE — PROGRESS NOTES
Pt was seen today in CR for a Phase 2 visit.  Vital signs and session notes recorded in [employer] and will be scanned into Epic by HIM.

## 2017-08-07 ENCOUNTER — APPOINTMENT (OUTPATIENT)
Dept: CARDIAC REHAB | Facility: HOSPITAL | Age: 77
End: 2017-08-07

## 2017-08-07 ENCOUNTER — TREATMENT (OUTPATIENT)
Dept: CARDIAC REHAB | Facility: HOSPITAL | Age: 77
End: 2017-08-07

## 2017-08-07 DIAGNOSIS — I21.4 NSTEMI (NON-ST ELEVATED MYOCARDIAL INFARCTION) (HCC): Primary | ICD-10-CM

## 2017-08-07 PROCEDURE — 93798 PHYS/QHP OP CAR RHAB W/ECG: CPT

## 2017-08-07 NOTE — PROGRESS NOTES
Pt was seen today in CR for a Phase 2 visit.  Vital signs and session notes recorded in [employer] and will be scanned into Epic by HIM.

## 2017-08-08 ENCOUNTER — APPOINTMENT (OUTPATIENT)
Dept: CARDIAC REHAB | Facility: HOSPITAL | Age: 77
End: 2017-08-08

## 2017-08-09 ENCOUNTER — TREATMENT (OUTPATIENT)
Dept: CARDIAC REHAB | Facility: HOSPITAL | Age: 77
End: 2017-08-09

## 2017-08-09 ENCOUNTER — APPOINTMENT (OUTPATIENT)
Dept: CARDIAC REHAB | Facility: HOSPITAL | Age: 77
End: 2017-08-09

## 2017-08-09 DIAGNOSIS — I21.4 NSTEMI (NON-ST ELEVATED MYOCARDIAL INFARCTION) (HCC): Primary | ICD-10-CM

## 2017-08-09 PROCEDURE — 93798 PHYS/QHP OP CAR RHAB W/ECG: CPT

## 2017-08-09 NOTE — PROGRESS NOTES
Pt was seen today in CR for a Phase 2 visit.  Vital signs and session notes recorded in [employer] and will be scanned into Epic by HIM.

## 2017-08-10 ENCOUNTER — APPOINTMENT (OUTPATIENT)
Dept: CARDIAC REHAB | Facility: HOSPITAL | Age: 77
End: 2017-08-10

## 2017-08-11 ENCOUNTER — APPOINTMENT (OUTPATIENT)
Dept: CARDIAC REHAB | Facility: HOSPITAL | Age: 77
End: 2017-08-11

## 2017-08-11 ENCOUNTER — TREATMENT (OUTPATIENT)
Dept: CARDIAC REHAB | Facility: HOSPITAL | Age: 77
End: 2017-08-11

## 2017-08-11 DIAGNOSIS — I21.4 NSTEMI (NON-ST ELEVATED MYOCARDIAL INFARCTION) (HCC): Primary | ICD-10-CM

## 2017-08-11 PROCEDURE — 93798 PHYS/QHP OP CAR RHAB W/ECG: CPT

## 2017-08-14 ENCOUNTER — APPOINTMENT (OUTPATIENT)
Dept: CARDIAC REHAB | Facility: HOSPITAL | Age: 77
End: 2017-08-14

## 2017-08-15 ENCOUNTER — APPOINTMENT (OUTPATIENT)
Dept: CARDIAC REHAB | Facility: HOSPITAL | Age: 77
End: 2017-08-15

## 2017-08-16 ENCOUNTER — TREATMENT (OUTPATIENT)
Dept: CARDIAC REHAB | Facility: HOSPITAL | Age: 77
End: 2017-08-16

## 2017-08-16 ENCOUNTER — APPOINTMENT (OUTPATIENT)
Dept: CARDIAC REHAB | Facility: HOSPITAL | Age: 77
End: 2017-08-16

## 2017-08-16 DIAGNOSIS — I21.4 NSTEMI (NON-ST ELEVATED MYOCARDIAL INFARCTION) (HCC): Primary | ICD-10-CM

## 2017-08-16 PROCEDURE — 93798 PHYS/QHP OP CAR RHAB W/ECG: CPT

## 2017-08-17 ENCOUNTER — APPOINTMENT (OUTPATIENT)
Dept: CARDIAC REHAB | Facility: HOSPITAL | Age: 77
End: 2017-08-17

## 2017-08-18 ENCOUNTER — TREATMENT (OUTPATIENT)
Dept: CARDIAC REHAB | Facility: HOSPITAL | Age: 77
End: 2017-08-18

## 2017-08-18 ENCOUNTER — APPOINTMENT (OUTPATIENT)
Dept: CARDIAC REHAB | Facility: HOSPITAL | Age: 77
End: 2017-08-18

## 2017-08-18 DIAGNOSIS — I21.4 NSTEMI (NON-ST ELEVATED MYOCARDIAL INFARCTION) (HCC): Primary | ICD-10-CM

## 2017-08-18 PROCEDURE — 93798 PHYS/QHP OP CAR RHAB W/ECG: CPT

## 2017-08-21 ENCOUNTER — TREATMENT (OUTPATIENT)
Dept: CARDIAC REHAB | Facility: HOSPITAL | Age: 77
End: 2017-08-21

## 2017-08-21 ENCOUNTER — APPOINTMENT (OUTPATIENT)
Dept: CARDIAC REHAB | Facility: HOSPITAL | Age: 77
End: 2017-08-21

## 2017-08-21 DIAGNOSIS — I21.4 NSTEMI (NON-ST ELEVATED MYOCARDIAL INFARCTION) (HCC): Primary | ICD-10-CM

## 2017-08-21 PROCEDURE — 93798 PHYS/QHP OP CAR RHAB W/ECG: CPT

## 2017-08-21 NOTE — PROGRESS NOTES
Pt was seen today in CR for a Phase 2 visit.  Vital signs and session notes recorded in [employer] and will be scanned into Epic by HIM.

## 2017-08-22 ENCOUNTER — APPOINTMENT (OUTPATIENT)
Dept: CARDIAC REHAB | Facility: HOSPITAL | Age: 77
End: 2017-08-22

## 2017-08-23 ENCOUNTER — TREATMENT (OUTPATIENT)
Dept: CARDIAC REHAB | Facility: HOSPITAL | Age: 77
End: 2017-08-23

## 2017-08-23 ENCOUNTER — APPOINTMENT (OUTPATIENT)
Dept: CARDIAC REHAB | Facility: HOSPITAL | Age: 77
End: 2017-08-23

## 2017-08-23 DIAGNOSIS — I21.4 NSTEMI (NON-ST ELEVATED MYOCARDIAL INFARCTION) (HCC): Primary | ICD-10-CM

## 2017-08-23 PROCEDURE — 93798 PHYS/QHP OP CAR RHAB W/ECG: CPT

## 2017-08-24 ENCOUNTER — APPOINTMENT (OUTPATIENT)
Dept: CARDIAC REHAB | Facility: HOSPITAL | Age: 77
End: 2017-08-24

## 2017-08-25 ENCOUNTER — TREATMENT (OUTPATIENT)
Dept: CARDIAC REHAB | Facility: HOSPITAL | Age: 77
End: 2017-08-25

## 2017-08-25 ENCOUNTER — APPOINTMENT (OUTPATIENT)
Dept: CARDIAC REHAB | Facility: HOSPITAL | Age: 77
End: 2017-08-25

## 2017-08-25 DIAGNOSIS — I21.4 NSTEMI (NON-ST ELEVATED MYOCARDIAL INFARCTION) (HCC): Primary | ICD-10-CM

## 2017-08-25 PROCEDURE — 93798 PHYS/QHP OP CAR RHAB W/ECG: CPT

## 2017-08-28 ENCOUNTER — APPOINTMENT (OUTPATIENT)
Dept: CARDIAC REHAB | Facility: HOSPITAL | Age: 77
End: 2017-08-28

## 2017-08-29 ENCOUNTER — APPOINTMENT (OUTPATIENT)
Dept: CARDIAC REHAB | Facility: HOSPITAL | Age: 77
End: 2017-08-29

## 2017-08-30 ENCOUNTER — APPOINTMENT (OUTPATIENT)
Dept: CARDIAC REHAB | Facility: HOSPITAL | Age: 77
End: 2017-08-30

## 2017-08-30 ENCOUNTER — TREATMENT (OUTPATIENT)
Dept: CARDIAC REHAB | Facility: HOSPITAL | Age: 77
End: 2017-08-30

## 2017-08-30 DIAGNOSIS — I21.4 NSTEMI (NON-ST ELEVATED MYOCARDIAL INFARCTION) (HCC): Primary | ICD-10-CM

## 2017-08-30 PROCEDURE — 93798 PHYS/QHP OP CAR RHAB W/ECG: CPT

## 2017-08-30 NOTE — PROGRESS NOTES
Pt was seen today in CR for a Phase 2 visit.  Vital signs and session notes recorded in [employer] and will be scanned into Epic by HIM.

## 2017-08-31 ENCOUNTER — APPOINTMENT (OUTPATIENT)
Dept: CARDIAC REHAB | Facility: HOSPITAL | Age: 77
End: 2017-08-31

## 2017-09-01 ENCOUNTER — TREATMENT (OUTPATIENT)
Dept: CARDIAC REHAB | Facility: HOSPITAL | Age: 77
End: 2017-09-01

## 2017-09-01 ENCOUNTER — APPOINTMENT (OUTPATIENT)
Dept: CARDIAC REHAB | Facility: HOSPITAL | Age: 77
End: 2017-09-01

## 2017-09-01 DIAGNOSIS — I21.4 NSTEMI (NON-ST ELEVATED MYOCARDIAL INFARCTION) (HCC): Primary | ICD-10-CM

## 2017-09-01 PROCEDURE — 93798 PHYS/QHP OP CAR RHAB W/ECG: CPT

## 2017-09-05 ENCOUNTER — APPOINTMENT (OUTPATIENT)
Dept: CARDIAC REHAB | Facility: HOSPITAL | Age: 77
End: 2017-09-05

## 2017-09-06 ENCOUNTER — TREATMENT (OUTPATIENT)
Dept: CARDIAC REHAB | Facility: HOSPITAL | Age: 77
End: 2017-09-06

## 2017-09-06 ENCOUNTER — APPOINTMENT (OUTPATIENT)
Dept: CARDIAC REHAB | Facility: HOSPITAL | Age: 77
End: 2017-09-06

## 2017-09-06 DIAGNOSIS — I21.4 NSTEMI (NON-ST ELEVATED MYOCARDIAL INFARCTION) (HCC): Primary | ICD-10-CM

## 2017-09-06 PROCEDURE — 93798 PHYS/QHP OP CAR RHAB W/ECG: CPT

## 2017-09-06 NOTE — PROGRESS NOTES
Pt was seen today in CR for a Phase 2 visit.  Vital signs and session notes recorded in [employer] and will be scanned into Epic by HIM.

## 2017-09-07 ENCOUNTER — APPOINTMENT (OUTPATIENT)
Dept: CARDIAC REHAB | Facility: HOSPITAL | Age: 77
End: 2017-09-07

## 2017-09-08 ENCOUNTER — APPOINTMENT (OUTPATIENT)
Dept: CARDIAC REHAB | Facility: HOSPITAL | Age: 77
End: 2017-09-08

## 2017-09-08 ENCOUNTER — TREATMENT (OUTPATIENT)
Dept: CARDIAC REHAB | Facility: HOSPITAL | Age: 77
End: 2017-09-08

## 2017-09-08 DIAGNOSIS — I21.4 NSTEMI (NON-ST ELEVATED MYOCARDIAL INFARCTION) (HCC): Primary | ICD-10-CM

## 2017-09-08 PROCEDURE — 93798 PHYS/QHP OP CAR RHAB W/ECG: CPT

## 2017-09-08 NOTE — PROGRESS NOTES
Pt was seen today in CR for a Phase 2 visit.  Vital signs and session notes recorded in [employer] and will be scanned into Epic by HIM.

## 2017-09-11 ENCOUNTER — TREATMENT (OUTPATIENT)
Dept: CARDIAC REHAB | Facility: HOSPITAL | Age: 77
End: 2017-09-11

## 2017-09-11 ENCOUNTER — APPOINTMENT (OUTPATIENT)
Dept: CARDIAC REHAB | Facility: HOSPITAL | Age: 77
End: 2017-09-11

## 2017-09-11 DIAGNOSIS — I21.4 NSTEMI (NON-ST ELEVATED MYOCARDIAL INFARCTION) (HCC): Primary | ICD-10-CM

## 2017-09-11 PROCEDURE — 93798 PHYS/QHP OP CAR RHAB W/ECG: CPT

## 2017-09-11 NOTE — PROGRESS NOTES
Pt was seen today in CR for a Phase 2 visit.  Vital signs and session notes recorded in [employer] and will be scanned into Epic by HIM.

## 2017-09-12 ENCOUNTER — APPOINTMENT (OUTPATIENT)
Dept: CARDIAC REHAB | Facility: HOSPITAL | Age: 77
End: 2017-09-12

## 2017-09-13 ENCOUNTER — APPOINTMENT (OUTPATIENT)
Dept: CARDIAC REHAB | Facility: HOSPITAL | Age: 77
End: 2017-09-13

## 2017-09-13 ENCOUNTER — TREATMENT (OUTPATIENT)
Dept: CARDIAC REHAB | Facility: HOSPITAL | Age: 77
End: 2017-09-13

## 2017-09-13 DIAGNOSIS — I21.4 NSTEMI (NON-ST ELEVATED MYOCARDIAL INFARCTION) (HCC): Primary | ICD-10-CM

## 2017-09-13 PROCEDURE — 93798 PHYS/QHP OP CAR RHAB W/ECG: CPT

## 2017-09-14 ENCOUNTER — APPOINTMENT (OUTPATIENT)
Dept: GENERAL RADIOLOGY | Facility: HOSPITAL | Age: 77
End: 2017-09-14

## 2017-09-14 ENCOUNTER — HOSPITAL ENCOUNTER (EMERGENCY)
Facility: HOSPITAL | Age: 77
Discharge: HOME | End: 2017-09-14
Attending: EMERGENCY MEDICINE | Admitting: EMERGENCY MEDICINE

## 2017-09-14 ENCOUNTER — APPOINTMENT (OUTPATIENT)
Dept: CARDIAC REHAB | Facility: HOSPITAL | Age: 77
End: 2017-09-14

## 2017-09-14 VITALS
TEMPERATURE: 98.1 F | SYSTOLIC BLOOD PRESSURE: 158 MMHG | HEIGHT: 75 IN | BODY MASS INDEX: 22.26 KG/M2 | DIASTOLIC BLOOD PRESSURE: 90 MMHG | WEIGHT: 179 LBS | RESPIRATION RATE: 18 BRPM | HEART RATE: 60 BPM | OXYGEN SATURATION: 96 %

## 2017-09-14 DIAGNOSIS — R06.02 SHORTNESS OF BREATH: ICD-10-CM

## 2017-09-14 DIAGNOSIS — R07.9 CHEST PAIN, UNSPECIFIED TYPE: Primary | ICD-10-CM

## 2017-09-14 LAB
ALBUMIN SERPL-MCNC: 4.6 G/DL (ref 3.5–5)
ALBUMIN/GLOB SERPL: 1.5 G/DL (ref 1–2)
ALP SERPL-CCNC: 55 U/L (ref 38–126)
ALT SERPL W P-5'-P-CCNC: 29 U/L (ref 13–69)
ANION GAP SERPL CALCULATED.3IONS-SCNC: 17.1 MMOL/L
AST SERPL-CCNC: 24 U/L (ref 15–46)
BASOPHILS # BLD AUTO: 0.03 10*3/MM3 (ref 0–0.2)
BASOPHILS NFR BLD AUTO: 0.5 % (ref 0–2.5)
BILIRUB SERPL-MCNC: 0.7 MG/DL (ref 0.2–1.3)
BUN BLD-MCNC: 14 MG/DL (ref 7–20)
BUN/CREAT SERPL: 11.7 (ref 6.3–21.9)
CALCIUM SPEC-SCNC: 9.3 MG/DL (ref 8.4–10.2)
CHLORIDE SERPL-SCNC: 104 MMOL/L (ref 98–107)
CO2 SERPL-SCNC: 26 MMOL/L (ref 26–30)
CREAT BLD-MCNC: 1.2 MG/DL (ref 0.6–1.3)
DEPRECATED RDW RBC AUTO: 43.1 FL (ref 37–54)
EOSINOPHIL # BLD AUTO: 0.2 10*3/MM3 (ref 0–0.7)
EOSINOPHIL NFR BLD AUTO: 3.3 % (ref 0–7)
ERYTHROCYTE [DISTWIDTH] IN BLOOD BY AUTOMATED COUNT: 14.6 % (ref 11.5–14.5)
GFR SERPL CREATININE-BSD FRML MDRD: 59 ML/MIN/1.73
GLOBULIN UR ELPH-MCNC: 3 GM/DL
GLUCOSE BLD-MCNC: 113 MG/DL (ref 74–98)
HCT VFR BLD AUTO: 38.9 % (ref 42–52)
HGB BLD-MCNC: 11.9 G/DL (ref 14–18)
HOLD SPECIMEN: NORMAL
HOLD SPECIMEN: NORMAL
IMM GRANULOCYTES # BLD: 0.04 10*3/MM3 (ref 0–0.06)
IMM GRANULOCYTES NFR BLD: 0.7 % (ref 0–0.6)
INR PPP: 6.65 (ref 0.9–1.1)
LYMPHOCYTES # BLD AUTO: 0.96 10*3/MM3 (ref 0.6–3.4)
LYMPHOCYTES NFR BLD AUTO: 15.9 % (ref 10–50)
MCH RBC QN AUTO: 24.5 PG (ref 27–31)
MCHC RBC AUTO-ENTMCNC: 30.6 G/DL (ref 30–37)
MCV RBC AUTO: 80.2 FL (ref 80–94)
MONOCYTES # BLD AUTO: 0.5 10*3/MM3 (ref 0–0.9)
MONOCYTES NFR BLD AUTO: 8.3 % (ref 0–12)
NEUTROPHILS # BLD AUTO: 4.31 10*3/MM3 (ref 2–6.9)
NEUTROPHILS NFR BLD AUTO: 71.3 % (ref 37–80)
NRBC BLD MANUAL-RTO: 0 /100 WBC (ref 0–0)
NT-PROBNP SERPL-MCNC: 1570 PG/ML (ref 0–450)
PLATELET # BLD AUTO: 251 10*3/MM3 (ref 130–400)
PMV BLD AUTO: 10.6 FL (ref 6–12)
POTASSIUM BLD-SCNC: 4.1 MMOL/L (ref 3.5–5.1)
PROT SERPL-MCNC: 7.6 G/DL (ref 6.3–8.2)
PROTHROMBIN TIME: 72.9 SECONDS (ref 9.3–12.1)
RBC # BLD AUTO: 4.85 10*6/MM3 (ref 4.7–6.1)
SODIUM BLD-SCNC: 143 MMOL/L (ref 137–145)
TROPONIN I SERPL-MCNC: 0.01 NG/ML (ref 0–0.05)
TROPONIN I SERPL-MCNC: 0.02 NG/ML (ref 0–0.03)
TROPONIN I SERPL-MCNC: 0.02 NG/ML (ref 0–0.03)
WBC NRBC COR # BLD: 6.04 10*3/MM3 (ref 4.8–10.8)
WHOLE BLOOD HOLD SPECIMEN: NORMAL
WHOLE BLOOD HOLD SPECIMEN: NORMAL

## 2017-09-14 PROCEDURE — 99284 EMERGENCY DEPT VISIT MOD MDM: CPT

## 2017-09-14 PROCEDURE — 83880 ASSAY OF NATRIURETIC PEPTIDE: CPT | Performed by: EMERGENCY MEDICINE

## 2017-09-14 PROCEDURE — 84484 ASSAY OF TROPONIN QUANT: CPT | Performed by: EMERGENCY MEDICINE

## 2017-09-14 PROCEDURE — 85025 COMPLETE CBC W/AUTO DIFF WBC: CPT

## 2017-09-14 PROCEDURE — 93005 ELECTROCARDIOGRAM TRACING: CPT

## 2017-09-14 PROCEDURE — 71010 HC CHEST PA OR AP: CPT

## 2017-09-14 PROCEDURE — 80053 COMPREHEN METABOLIC PANEL: CPT

## 2017-09-14 PROCEDURE — 84484 ASSAY OF TROPONIN QUANT: CPT

## 2017-09-14 PROCEDURE — 85610 PROTHROMBIN TIME: CPT | Performed by: EMERGENCY MEDICINE

## 2017-09-14 RX ORDER — SODIUM CHLORIDE 0.9 % (FLUSH) 0.9 %
10 SYRINGE (ML) INJECTION AS NEEDED
Status: DISCONTINUED | OUTPATIENT
Start: 2017-09-14 | End: 2017-09-14 | Stop reason: HOSPADM

## 2017-09-14 RX ORDER — ASPIRIN 325 MG
325 TABLET ORAL ONCE
Status: COMPLETED | OUTPATIENT
Start: 2017-09-14 | End: 2017-09-14

## 2017-09-14 RX ADMIN — NITROGLYCERIN 1 INCH: 20 OINTMENT TOPICAL at 11:39

## 2017-09-14 RX ADMIN — ASPIRIN 325 MG ORAL TABLET 325 MG: 325 PILL ORAL at 11:39

## 2017-09-14 NOTE — ED PROVIDER NOTES
Subjective   HPI Comments: 77-year-old male presenting with chest pain and shortness of breath.  He states that about 6 hours prior to arrival he woke up and had diffuse chest pressure, the sensation of smothering.  He took a nitroglycerin which alleviated his symptoms.  They came back so he presented here for evaluation.  He states that earlier this year he had a coronary bypass and subsequent cardiac stents as well.  He denies any fevers, chills, cough, leg swelling, nausea, vomiting.      Review of Systems   Constitutional: Negative for chills and fever.   HENT: Negative for congestion, rhinorrhea and sore throat.    Eyes: Negative for pain.   Respiratory: Positive for chest tightness and shortness of breath. Negative for cough.    Cardiovascular: Positive for chest pain. Negative for palpitations and leg swelling.   Gastrointestinal: Negative for abdominal pain, diarrhea, nausea and vomiting.   Genitourinary: Negative for dysuria.   Musculoskeletal: Negative for arthralgias.   Skin: Negative for rash.   Neurological: Negative for weakness and numbness.   Psychiatric/Behavioral: Negative for behavioral problems.       Past Medical History:   Diagnosis Date   • Anxiety    • Arnold-Chiari malformation, type I     followed by Dr. Martinez, but last note available was 2014   • BPH (benign prostatic hyperplasia)    • Chronic Pleural effusion on right    • Complex partial seizure     followed by Dr. Martinez, but last note available was 2014   • Congestive heart failure     follows with Dr. Pompa at [facility], EF 30%   • Coronary artery disease     on ASA/plavix   • CVA (cerebral vascular accident)     apical thrombus on chronic coumadin   • Diabetes mellitus    • ED (erectile dysfunction)    • GERD (gastroesophageal reflux disease)    • Hyperlipidemia    • Hypertension    • Ischemic cardiomyopathy    • Myocardial infarction    • Non-sustained ventricular tachycardia        Allergies   Allergen Reactions   • Latex Rash        Past Surgical History:   Procedure Laterality Date   • CARDIAC CATHETERIZATION  02/2016    [facility], 3 vessel disease, patent LIMA to LAD bypass graft    • CARDIAC CATHETERIZATION  05/25/2017    [facility], severe 3 vessel disease, patent LIMA to LAD bypass graft, recommend medical management    • CARDIAC DEFIBRILLATOR PLACEMENT  08/2009    w/ PPM Medtronic (DDD)   • COLONOSCOPY W/ BIOPSIES     • CORONARY ANGIOPLASTY WITH STENT PLACEMENT     • CORONARY ARTERY BYPASS GRAFT  2000    [facility]   • INSERT / REPLACE / REMOVE PACEMAKER  05/26/2017    Medtronic generator change, Dr. oH, [facility]    • INTERVENTIONAL RADIOLOGY PROCEDURE Bilateral 6/1/2017    Procedure: Carotid Cerebral Angiogram;  Surgeon: Wil Rodrigez MD;  Location: [facility] INVASIVE LOCATION;  Service:        Family History   Problem Relation Age of Onset   • Diabetes Mother    • Hypertension Mother    • Heart disease Mother    • Heart disease Father    • Stroke Father    • Heart disease Sister    • Diabetes Other    • Heart disease Other    • Hypertension Other    • Stroke Other        Social History     Social History   • Marital status:      Spouse name: N/A   • Number of children: N/A   • Years of education: N/A     Occupational History   •  Retired     Social History Main Topics   • Smoking status: Never Smoker   • Smokeless tobacco: Never Used   • Alcohol use No   • Drug use: No   • Sexual activity: Defer      Comment:      Other Topics Concern   • None     Social History Narrative           Objective   Physical Exam   Constitutional: He is oriented to person, place, and time. He appears well-developed and well-nourished. No distress.   HENT:   Head: Normocephalic and atraumatic.   Right Ear: External ear normal.   Left Ear: External ear normal.   Nose: Nose normal.   Mouth/Throat: Oropharynx is clear and moist.   Eyes: Conjunctivae and EOM are normal. Pupils are equal, round, and reactive to light.   Neck: Normal range of motion. Neck  supple. No JVD present.   Cardiovascular: Normal rate, regular rhythm, normal heart sounds and intact distal pulses.  Exam reveals no friction rub.    No murmur heard.  Pulmonary/Chest: Effort normal and breath sounds normal. No respiratory distress. He has no wheezes. He has no rales.   Abdominal: Soft. Bowel sounds are normal. He exhibits no distension. There is no tenderness. There is no rebound and no guarding.   Musculoskeletal: Normal range of motion. He exhibits no edema, tenderness or deformity.   Neurological: He is alert and oriented to person, place, and time.   Skin: Skin is warm and dry. No rash noted.   Psychiatric: He has a normal mood and affect. His behavior is normal.   Nursing note and vitals reviewed.      Procedures         ED Course  ED Course                  MDM  Number of Diagnoses or Management Options  Chest pain, unspecified type:   Shortness of breath:   Diagnosis management comments: 77-year-old male with chest pain and shortness of breath.  Well-developed, well-nourished elderly man in no distress with exam as above.  We'll check labs, EKG and chest x-ray.  We'll treat with aspirin and nitroglycerin.  Disposition pending workup.    DDX: ACS, CHF, anxiety, GERD, musculoskeletal pain    EKG: Paced rhythm    First the labs are unremarkable for any acute or significant findings.  His BNP is actually better than previously checked.  I discussed the case with his cardiologist, Dr. French, he recommended repeat enzymes and discussing with patient what he felt comfortable doing.  Repeat enzymes are unchanged.  Patient has remained pain-free in the like to go home.  I think this is reasonable plan.  Encouraged him to follow up Dr. French first thing in the morning.       Amount and/or Complexity of Data Reviewed  Decide to obtain previous medical records or to obtain history from someone other than the patient: yes        Final diagnoses:   Chest pain, unspecified type   Shortness of breath             Ortega Hutson MD  09/14/17 1992

## 2017-09-14 NOTE — ED NOTES
Dr. French was called at 1259, the call was transferred to Dr. Hutson at this time.      Adan Valdez  09/14/17 1294

## 2017-09-15 ENCOUNTER — APPOINTMENT (OUTPATIENT)
Dept: CARDIAC REHAB | Facility: HOSPITAL | Age: 77
End: 2017-09-15

## 2017-09-15 ENCOUNTER — TREATMENT (OUTPATIENT)
Dept: CARDIAC REHAB | Facility: HOSPITAL | Age: 77
End: 2017-09-15

## 2017-09-15 DIAGNOSIS — I21.4 NSTEMI (NON-ST ELEVATED MYOCARDIAL INFARCTION) (HCC): Primary | ICD-10-CM

## 2017-09-15 NOTE — PROGRESS NOTES
Pt was seen today in CR for a Phase 2 visit.  Vital signs and session notes recorded in [employer] and will be scanned into Epic by HIM.

## 2017-09-18 ENCOUNTER — APPOINTMENT (OUTPATIENT)
Dept: CARDIAC REHAB | Facility: HOSPITAL | Age: 77
End: 2017-09-18

## 2017-09-19 ENCOUNTER — APPOINTMENT (OUTPATIENT)
Dept: CARDIAC REHAB | Facility: HOSPITAL | Age: 77
End: 2017-09-19

## 2017-09-20 ENCOUNTER — APPOINTMENT (OUTPATIENT)
Dept: CARDIAC REHAB | Facility: HOSPITAL | Age: 77
End: 2017-09-20

## 2017-09-21 ENCOUNTER — APPOINTMENT (OUTPATIENT)
Dept: CARDIAC REHAB | Facility: HOSPITAL | Age: 77
End: 2017-09-21

## 2017-09-22 ENCOUNTER — APPOINTMENT (OUTPATIENT)
Dept: CARDIAC REHAB | Facility: HOSPITAL | Age: 77
End: 2017-09-22

## 2017-09-25 ENCOUNTER — APPOINTMENT (OUTPATIENT)
Dept: CARDIAC REHAB | Facility: HOSPITAL | Age: 77
End: 2017-09-25

## 2017-09-26 ENCOUNTER — APPOINTMENT (OUTPATIENT)
Dept: CARDIAC REHAB | Facility: HOSPITAL | Age: 77
End: 2017-09-26

## 2017-09-27 ENCOUNTER — APPOINTMENT (OUTPATIENT)
Dept: CARDIAC REHAB | Facility: HOSPITAL | Age: 77
End: 2017-09-27

## 2017-09-28 ENCOUNTER — APPOINTMENT (OUTPATIENT)
Dept: CARDIAC REHAB | Facility: HOSPITAL | Age: 77
End: 2017-09-28

## 2017-09-29 ENCOUNTER — APPOINTMENT (OUTPATIENT)
Dept: CARDIAC REHAB | Facility: HOSPITAL | Age: 77
End: 2017-09-29

## 2017-10-02 ENCOUNTER — TREATMENT (OUTPATIENT)
Dept: CARDIAC REHAB | Facility: HOSPITAL | Age: 77
End: 2017-10-02

## 2017-10-02 ENCOUNTER — APPOINTMENT (OUTPATIENT)
Dept: CARDIAC REHAB | Facility: HOSPITAL | Age: 77
End: 2017-10-02

## 2017-10-02 DIAGNOSIS — I21.4 NSTEMI (NON-ST ELEVATED MYOCARDIAL INFARCTION) (HCC): Primary | ICD-10-CM

## 2017-10-02 PROCEDURE — 93798 PHYS/QHP OP CAR RHAB W/ECG: CPT

## 2017-10-03 ENCOUNTER — APPOINTMENT (OUTPATIENT)
Dept: CARDIAC REHAB | Facility: HOSPITAL | Age: 77
End: 2017-10-03

## 2017-10-04 ENCOUNTER — APPOINTMENT (OUTPATIENT)
Dept: CARDIAC REHAB | Facility: HOSPITAL | Age: 77
End: 2017-10-04

## 2017-10-04 ENCOUNTER — TREATMENT (OUTPATIENT)
Dept: CARDIAC REHAB | Facility: HOSPITAL | Age: 77
End: 2017-10-04

## 2017-10-04 DIAGNOSIS — I21.4 NSTEMI (NON-ST ELEVATED MYOCARDIAL INFARCTION) (HCC): Primary | ICD-10-CM

## 2017-10-04 PROCEDURE — 93798 PHYS/QHP OP CAR RHAB W/ECG: CPT

## 2017-10-05 ENCOUNTER — APPOINTMENT (OUTPATIENT)
Dept: CARDIAC REHAB | Facility: HOSPITAL | Age: 77
End: 2017-10-05

## 2017-10-06 ENCOUNTER — APPOINTMENT (OUTPATIENT)
Dept: CARDIAC REHAB | Facility: HOSPITAL | Age: 77
End: 2017-10-06

## 2017-10-06 ENCOUNTER — TREATMENT (OUTPATIENT)
Dept: CARDIAC REHAB | Facility: HOSPITAL | Age: 77
End: 2017-10-06

## 2017-10-06 DIAGNOSIS — I25.119 CORONARY ARTERY DISEASE WITH ANGINA PECTORIS, UNSPECIFIED VESSEL OR LESION TYPE, UNSPECIFIED WHETHER NATIVE OR TRANSPLANTED HEART (HCC): ICD-10-CM

## 2017-10-06 DIAGNOSIS — I21.4 NSTEMI (NON-ST ELEVATED MYOCARDIAL INFARCTION) (HCC): Primary | ICD-10-CM

## 2017-10-06 PROCEDURE — 93798 PHYS/QHP OP CAR RHAB W/ECG: CPT

## 2017-10-06 NOTE — PROGRESS NOTES
Pt was seen today in CR for a Phase 2 visit.  Vital signs and session notes recorded in [employer] and will be scanned into Epic by HIM.

## 2017-10-09 ENCOUNTER — APPOINTMENT (OUTPATIENT)
Dept: CARDIAC REHAB | Facility: HOSPITAL | Age: 77
End: 2017-10-09

## 2017-10-09 ENCOUNTER — TREATMENT (OUTPATIENT)
Dept: CARDIAC REHAB | Facility: HOSPITAL | Age: 77
End: 2017-10-09

## 2017-10-09 DIAGNOSIS — I21.4 NSTEMI (NON-ST ELEVATED MYOCARDIAL INFARCTION) (HCC): Primary | ICD-10-CM

## 2017-10-09 DIAGNOSIS — I25.119 CORONARY ARTERY DISEASE WITH ANGINA PECTORIS, UNSPECIFIED VESSEL OR LESION TYPE, UNSPECIFIED WHETHER NATIVE OR TRANSPLANTED HEART (HCC): ICD-10-CM

## 2017-10-09 PROCEDURE — 93798 PHYS/QHP OP CAR RHAB W/ECG: CPT

## 2017-10-09 NOTE — PROGRESS NOTES
Pt was seen today in CR for a Phase 2 visit.  Vital signs and session notes recorded in [employer] and will be scanned into Epic by HIM.

## 2017-10-10 ENCOUNTER — APPOINTMENT (OUTPATIENT)
Dept: CARDIAC REHAB | Facility: HOSPITAL | Age: 77
End: 2017-10-10

## 2017-10-11 ENCOUNTER — APPOINTMENT (OUTPATIENT)
Dept: CARDIAC REHAB | Facility: HOSPITAL | Age: 77
End: 2017-10-11

## 2017-10-11 ENCOUNTER — TREATMENT (OUTPATIENT)
Dept: CARDIAC REHAB | Facility: HOSPITAL | Age: 77
End: 2017-10-11

## 2017-10-11 DIAGNOSIS — I21.4 NSTEMI (NON-ST ELEVATED MYOCARDIAL INFARCTION) (HCC): Primary | ICD-10-CM

## 2017-10-11 DIAGNOSIS — I25.119 CORONARY ARTERY DISEASE WITH ANGINA PECTORIS, UNSPECIFIED VESSEL OR LESION TYPE, UNSPECIFIED WHETHER NATIVE OR TRANSPLANTED HEART (HCC): ICD-10-CM

## 2017-10-11 PROCEDURE — 93798 PHYS/QHP OP CAR RHAB W/ECG: CPT

## 2017-10-11 NOTE — PROGRESS NOTES
Pt was seen today in CR for a Phase 2 visit.  Vital signs and session notes recorded in [employer] and will be scanned into Epic by HIM.

## 2017-10-12 ENCOUNTER — APPOINTMENT (OUTPATIENT)
Dept: CARDIAC REHAB | Facility: HOSPITAL | Age: 77
End: 2017-10-12

## 2017-10-13 ENCOUNTER — APPOINTMENT (OUTPATIENT)
Dept: CARDIAC REHAB | Facility: HOSPITAL | Age: 77
End: 2017-10-13

## 2017-10-16 ENCOUNTER — APPOINTMENT (OUTPATIENT)
Dept: CARDIAC REHAB | Facility: HOSPITAL | Age: 77
End: 2017-10-16

## 2017-10-16 ENCOUNTER — TREATMENT (OUTPATIENT)
Dept: CARDIAC REHAB | Facility: HOSPITAL | Age: 77
End: 2017-10-16

## 2017-10-16 DIAGNOSIS — I21.4 NSTEMI (NON-ST ELEVATED MYOCARDIAL INFARCTION) (HCC): Primary | ICD-10-CM

## 2017-10-16 PROCEDURE — 93798 PHYS/QHP OP CAR RHAB W/ECG: CPT

## 2017-10-17 ENCOUNTER — APPOINTMENT (OUTPATIENT)
Dept: CARDIAC REHAB | Facility: HOSPITAL | Age: 77
End: 2017-10-17

## 2017-10-18 ENCOUNTER — APPOINTMENT (OUTPATIENT)
Dept: CARDIAC REHAB | Facility: HOSPITAL | Age: 77
End: 2017-10-18

## 2017-10-18 ENCOUNTER — TREATMENT (OUTPATIENT)
Dept: CARDIAC REHAB | Facility: HOSPITAL | Age: 77
End: 2017-10-18

## 2017-10-18 DIAGNOSIS — I25.119 CORONARY ARTERY DISEASE WITH ANGINA PECTORIS, UNSPECIFIED VESSEL OR LESION TYPE, UNSPECIFIED WHETHER NATIVE OR TRANSPLANTED HEART (HCC): ICD-10-CM

## 2017-10-18 DIAGNOSIS — I21.4 NSTEMI (NON-ST ELEVATED MYOCARDIAL INFARCTION) (HCC): Primary | ICD-10-CM

## 2017-10-18 LAB — GLUCOSE BLDC GLUCOMTR-MCNC: 140 MG/DL (ref 70–130)

## 2017-10-18 PROCEDURE — 93798 PHYS/QHP OP CAR RHAB W/ECG: CPT

## 2017-10-18 PROCEDURE — 82962 GLUCOSE BLOOD TEST: CPT

## 2017-10-18 NOTE — PROGRESS NOTES
Pt was seen today in CR for a Phase 2 visit.  Vital signs and session notes recorded in [employer] and will be scanned into Epic by HIM.

## 2017-10-19 ENCOUNTER — APPOINTMENT (OUTPATIENT)
Dept: CARDIAC REHAB | Facility: HOSPITAL | Age: 77
End: 2017-10-19

## 2017-10-20 ENCOUNTER — APPOINTMENT (OUTPATIENT)
Dept: CARDIAC REHAB | Facility: HOSPITAL | Age: 77
End: 2017-10-20

## 2017-10-20 ENCOUNTER — TREATMENT (OUTPATIENT)
Dept: CARDIAC REHAB | Facility: HOSPITAL | Age: 77
End: 2017-10-20

## 2017-10-20 DIAGNOSIS — I21.4 NSTEMI (NON-ST ELEVATED MYOCARDIAL INFARCTION) (HCC): Primary | ICD-10-CM

## 2017-10-20 PROCEDURE — 93798 PHYS/QHP OP CAR RHAB W/ECG: CPT

## 2017-10-23 ENCOUNTER — OFFICE VISIT (OUTPATIENT)
Dept: CARDIAC REHAB | Facility: HOSPITAL | Age: 77
End: 2017-10-23

## 2017-10-23 ENCOUNTER — APPOINTMENT (OUTPATIENT)
Dept: CARDIAC REHAB | Facility: HOSPITAL | Age: 77
End: 2017-10-23

## 2017-10-23 ENCOUNTER — TRANSCRIBE ORDERS (OUTPATIENT)
Dept: CARDIAC REHAB | Facility: HOSPITAL | Age: 77
End: 2017-10-23

## 2017-10-23 DIAGNOSIS — I21.4 NSTEMI (NON-ST ELEVATED MYOCARDIAL INFARCTION) (HCC): Primary | ICD-10-CM

## 2017-10-24 ENCOUNTER — APPOINTMENT (OUTPATIENT)
Dept: CARDIAC REHAB | Facility: HOSPITAL | Age: 77
End: 2017-10-24

## 2017-10-25 ENCOUNTER — APPOINTMENT (OUTPATIENT)
Dept: CARDIAC REHAB | Facility: HOSPITAL | Age: 77
End: 2017-10-25

## 2017-10-25 ENCOUNTER — OFFICE VISIT (OUTPATIENT)
Dept: CARDIAC REHAB | Facility: HOSPITAL | Age: 77
End: 2017-10-25

## 2017-10-25 DIAGNOSIS — I21.4 NSTEMI (NON-ST ELEVATED MYOCARDIAL INFARCTION) (HCC): Primary | ICD-10-CM

## 2017-10-26 ENCOUNTER — APPOINTMENT (OUTPATIENT)
Dept: CARDIAC REHAB | Facility: HOSPITAL | Age: 77
End: 2017-10-26

## 2017-10-27 ENCOUNTER — APPOINTMENT (OUTPATIENT)
Dept: CARDIAC REHAB | Facility: HOSPITAL | Age: 77
End: 2017-10-27

## 2017-10-27 ENCOUNTER — OFFICE VISIT (OUTPATIENT)
Dept: CARDIAC REHAB | Facility: HOSPITAL | Age: 77
End: 2017-10-27

## 2017-10-27 DIAGNOSIS — I21.4 NSTEMI (NON-ST ELEVATED MYOCARDIAL INFARCTION) (HCC): Primary | ICD-10-CM

## 2017-10-30 ENCOUNTER — OFFICE VISIT (OUTPATIENT)
Dept: CARDIAC REHAB | Facility: HOSPITAL | Age: 77
End: 2017-10-30

## 2017-10-30 ENCOUNTER — APPOINTMENT (OUTPATIENT)
Dept: CARDIAC REHAB | Facility: HOSPITAL | Age: 77
End: 2017-10-30

## 2017-10-30 DIAGNOSIS — I21.4 NSTEMI (NON-ST ELEVATED MYOCARDIAL INFARCTION) (HCC): Primary | ICD-10-CM

## 2017-10-31 ENCOUNTER — APPOINTMENT (OUTPATIENT)
Dept: CARDIAC REHAB | Facility: HOSPITAL | Age: 77
End: 2017-10-31

## 2017-11-01 ENCOUNTER — APPOINTMENT (OUTPATIENT)
Dept: CARDIAC REHAB | Facility: HOSPITAL | Age: 77
End: 2017-11-01

## 2017-11-01 ENCOUNTER — OFFICE VISIT (OUTPATIENT)
Dept: CARDIAC REHAB | Facility: HOSPITAL | Age: 77
End: 2017-11-01

## 2017-11-01 DIAGNOSIS — I21.4 NSTEMI (NON-ST ELEVATED MYOCARDIAL INFARCTION) (HCC): Primary | ICD-10-CM

## 2017-11-02 ENCOUNTER — APPOINTMENT (OUTPATIENT)
Dept: CARDIAC REHAB | Facility: HOSPITAL | Age: 77
End: 2017-11-02

## 2017-11-03 ENCOUNTER — APPOINTMENT (OUTPATIENT)
Dept: CARDIAC REHAB | Facility: HOSPITAL | Age: 77
End: 2017-11-03

## 2017-11-03 ENCOUNTER — OFFICE VISIT (OUTPATIENT)
Dept: CARDIAC REHAB | Facility: HOSPITAL | Age: 77
End: 2017-11-03

## 2017-11-03 DIAGNOSIS — I21.4 NSTEMI (NON-ST ELEVATED MYOCARDIAL INFARCTION) (HCC): Primary | ICD-10-CM

## 2017-11-06 ENCOUNTER — APPOINTMENT (OUTPATIENT)
Dept: CARDIAC REHAB | Facility: HOSPITAL | Age: 77
End: 2017-11-06

## 2017-11-06 ENCOUNTER — OFFICE VISIT (OUTPATIENT)
Dept: CARDIAC REHAB | Facility: HOSPITAL | Age: 77
End: 2017-11-06

## 2017-11-06 DIAGNOSIS — I25.119 CORONARY ARTERY DISEASE WITH ANGINA PECTORIS, UNSPECIFIED VESSEL OR LESION TYPE, UNSPECIFIED WHETHER NATIVE OR TRANSPLANTED HEART (HCC): ICD-10-CM

## 2017-11-06 DIAGNOSIS — I21.4 NSTEMI (NON-ST ELEVATED MYOCARDIAL INFARCTION) (HCC): Primary | ICD-10-CM

## 2017-11-07 ENCOUNTER — APPOINTMENT (OUTPATIENT)
Dept: CARDIAC REHAB | Facility: HOSPITAL | Age: 77
End: 2017-11-07

## 2017-11-08 ENCOUNTER — OFFICE VISIT (OUTPATIENT)
Dept: CARDIAC REHAB | Facility: HOSPITAL | Age: 77
End: 2017-11-08

## 2017-11-08 ENCOUNTER — APPOINTMENT (OUTPATIENT)
Dept: CARDIAC REHAB | Facility: HOSPITAL | Age: 77
End: 2017-11-08

## 2017-11-08 DIAGNOSIS — I21.4 NSTEMI (NON-ST ELEVATED MYOCARDIAL INFARCTION) (HCC): Primary | ICD-10-CM

## 2017-11-09 ENCOUNTER — APPOINTMENT (OUTPATIENT)
Dept: CARDIAC REHAB | Facility: HOSPITAL | Age: 77
End: 2017-11-09

## 2017-11-10 ENCOUNTER — APPOINTMENT (OUTPATIENT)
Dept: CARDIAC REHAB | Facility: HOSPITAL | Age: 77
End: 2017-11-10

## 2017-11-10 ENCOUNTER — OFFICE VISIT (OUTPATIENT)
Dept: CARDIAC REHAB | Facility: HOSPITAL | Age: 77
End: 2017-11-10

## 2017-11-10 DIAGNOSIS — I21.4 NSTEMI (NON-ST ELEVATED MYOCARDIAL INFARCTION) (HCC): Primary | ICD-10-CM

## 2017-11-13 ENCOUNTER — APPOINTMENT (OUTPATIENT)
Dept: CARDIAC REHAB | Facility: HOSPITAL | Age: 77
End: 2017-11-13

## 2017-11-13 ENCOUNTER — OFFICE VISIT (OUTPATIENT)
Dept: CARDIAC REHAB | Facility: HOSPITAL | Age: 77
End: 2017-11-13

## 2017-11-13 DIAGNOSIS — I21.4 NSTEMI (NON-ST ELEVATED MYOCARDIAL INFARCTION) (HCC): Primary | ICD-10-CM

## 2017-11-14 ENCOUNTER — APPOINTMENT (OUTPATIENT)
Dept: CARDIAC REHAB | Facility: HOSPITAL | Age: 77
End: 2017-11-14

## 2017-11-15 ENCOUNTER — OFFICE VISIT (OUTPATIENT)
Dept: CARDIAC REHAB | Facility: HOSPITAL | Age: 77
End: 2017-11-15

## 2017-11-15 ENCOUNTER — APPOINTMENT (OUTPATIENT)
Dept: CARDIAC REHAB | Facility: HOSPITAL | Age: 77
End: 2017-11-15

## 2017-11-15 DIAGNOSIS — I21.4 NSTEMI (NON-ST ELEVATED MYOCARDIAL INFARCTION) (HCC): Primary | ICD-10-CM

## 2017-11-16 ENCOUNTER — APPOINTMENT (OUTPATIENT)
Dept: CARDIAC REHAB | Facility: HOSPITAL | Age: 77
End: 2017-11-16

## 2017-11-17 ENCOUNTER — APPOINTMENT (OUTPATIENT)
Dept: CARDIAC REHAB | Facility: HOSPITAL | Age: 77
End: 2017-11-17

## 2017-11-20 ENCOUNTER — OFFICE VISIT (OUTPATIENT)
Dept: CARDIAC REHAB | Facility: HOSPITAL | Age: 77
End: 2017-11-20

## 2017-11-20 ENCOUNTER — APPOINTMENT (OUTPATIENT)
Dept: CARDIAC REHAB | Facility: HOSPITAL | Age: 77
End: 2017-11-20

## 2017-11-20 DIAGNOSIS — I21.4 NSTEMI (NON-ST ELEVATED MYOCARDIAL INFARCTION) (HCC): Primary | ICD-10-CM

## 2017-11-21 ENCOUNTER — APPOINTMENT (OUTPATIENT)
Dept: CARDIAC REHAB | Facility: HOSPITAL | Age: 77
End: 2017-11-21

## 2017-11-22 ENCOUNTER — APPOINTMENT (OUTPATIENT)
Dept: CARDIAC REHAB | Facility: HOSPITAL | Age: 77
End: 2017-11-22

## 2017-11-22 ENCOUNTER — OFFICE VISIT (OUTPATIENT)
Dept: CARDIAC REHAB | Facility: HOSPITAL | Age: 77
End: 2017-11-22

## 2017-11-22 DIAGNOSIS — I21.4 NSTEMI (NON-ST ELEVATED MYOCARDIAL INFARCTION) (HCC): Primary | ICD-10-CM

## 2017-11-24 ENCOUNTER — APPOINTMENT (OUTPATIENT)
Dept: CARDIAC REHAB | Facility: HOSPITAL | Age: 77
End: 2017-11-24

## 2017-11-27 ENCOUNTER — OFFICE VISIT (OUTPATIENT)
Dept: CARDIAC REHAB | Facility: HOSPITAL | Age: 77
End: 2017-11-27

## 2017-11-27 ENCOUNTER — APPOINTMENT (OUTPATIENT)
Dept: CARDIAC REHAB | Facility: HOSPITAL | Age: 77
End: 2017-11-27

## 2017-11-27 DIAGNOSIS — I21.4 NSTEMI (NON-ST ELEVATED MYOCARDIAL INFARCTION) (HCC): Primary | ICD-10-CM

## 2017-11-28 ENCOUNTER — APPOINTMENT (OUTPATIENT)
Dept: CARDIAC REHAB | Facility: HOSPITAL | Age: 77
End: 2017-11-28

## 2017-11-29 ENCOUNTER — APPOINTMENT (OUTPATIENT)
Dept: CARDIAC REHAB | Facility: HOSPITAL | Age: 77
End: 2017-11-29

## 2017-11-29 ENCOUNTER — OFFICE VISIT (OUTPATIENT)
Dept: CARDIAC REHAB | Facility: HOSPITAL | Age: 77
End: 2017-11-29

## 2017-11-29 DIAGNOSIS — I21.4 NSTEMI (NON-ST ELEVATED MYOCARDIAL INFARCTION) (HCC): Primary | ICD-10-CM

## 2017-11-30 ENCOUNTER — APPOINTMENT (OUTPATIENT)
Dept: CARDIAC REHAB | Facility: HOSPITAL | Age: 77
End: 2017-11-30

## 2017-12-01 ENCOUNTER — APPOINTMENT (OUTPATIENT)
Dept: CARDIAC REHAB | Facility: HOSPITAL | Age: 77
End: 2017-12-01

## 2017-12-01 ENCOUNTER — OFFICE VISIT (OUTPATIENT)
Dept: CARDIAC REHAB | Facility: HOSPITAL | Age: 77
End: 2017-12-01

## 2017-12-01 DIAGNOSIS — I21.4 NSTEMI (NON-ST ELEVATED MYOCARDIAL INFARCTION) (HCC): Primary | ICD-10-CM

## 2017-12-04 ENCOUNTER — APPOINTMENT (OUTPATIENT)
Dept: CARDIAC REHAB | Facility: HOSPITAL | Age: 77
End: 2017-12-04

## 2017-12-04 ENCOUNTER — OFFICE VISIT (OUTPATIENT)
Dept: CARDIAC REHAB | Facility: HOSPITAL | Age: 77
End: 2017-12-04

## 2017-12-04 DIAGNOSIS — I21.4 NSTEMI (NON-ST ELEVATED MYOCARDIAL INFARCTION) (HCC): Primary | ICD-10-CM

## 2017-12-05 ENCOUNTER — APPOINTMENT (OUTPATIENT)
Dept: CARDIAC REHAB | Facility: HOSPITAL | Age: 77
End: 2017-12-05

## 2017-12-06 ENCOUNTER — APPOINTMENT (OUTPATIENT)
Dept: CARDIAC REHAB | Facility: HOSPITAL | Age: 77
End: 2017-12-06

## 2017-12-06 ENCOUNTER — OFFICE VISIT (OUTPATIENT)
Dept: CARDIAC REHAB | Facility: HOSPITAL | Age: 77
End: 2017-12-06

## 2017-12-06 DIAGNOSIS — I21.4 NSTEMI (NON-ST ELEVATED MYOCARDIAL INFARCTION) (HCC): Primary | ICD-10-CM

## 2017-12-07 ENCOUNTER — APPOINTMENT (OUTPATIENT)
Dept: CARDIAC REHAB | Facility: HOSPITAL | Age: 77
End: 2017-12-07

## 2017-12-08 ENCOUNTER — OFFICE VISIT (OUTPATIENT)
Dept: CARDIAC REHAB | Facility: HOSPITAL | Age: 77
End: 2017-12-08

## 2017-12-08 ENCOUNTER — APPOINTMENT (OUTPATIENT)
Dept: CARDIAC REHAB | Facility: HOSPITAL | Age: 77
End: 2017-12-08

## 2017-12-08 DIAGNOSIS — I21.4 NSTEMI (NON-ST ELEVATED MYOCARDIAL INFARCTION) (HCC): Primary | ICD-10-CM

## 2017-12-11 ENCOUNTER — OFFICE VISIT (OUTPATIENT)
Dept: CARDIAC REHAB | Facility: HOSPITAL | Age: 77
End: 2017-12-11

## 2017-12-11 ENCOUNTER — APPOINTMENT (OUTPATIENT)
Dept: CARDIAC REHAB | Facility: HOSPITAL | Age: 77
End: 2017-12-11

## 2017-12-11 DIAGNOSIS — I21.4 NSTEMI (NON-ST ELEVATED MYOCARDIAL INFARCTION) (HCC): Primary | ICD-10-CM

## 2017-12-12 ENCOUNTER — APPOINTMENT (OUTPATIENT)
Dept: CARDIAC REHAB | Facility: HOSPITAL | Age: 77
End: 2017-12-12

## 2017-12-13 ENCOUNTER — APPOINTMENT (OUTPATIENT)
Dept: CARDIAC REHAB | Facility: HOSPITAL | Age: 77
End: 2017-12-13

## 2017-12-13 ENCOUNTER — OFFICE VISIT (OUTPATIENT)
Dept: CARDIAC REHAB | Facility: HOSPITAL | Age: 77
End: 2017-12-13

## 2017-12-13 DIAGNOSIS — I21.4 NSTEMI (NON-ST ELEVATED MYOCARDIAL INFARCTION) (HCC): Primary | ICD-10-CM

## 2017-12-14 ENCOUNTER — APPOINTMENT (OUTPATIENT)
Dept: CARDIAC REHAB | Facility: HOSPITAL | Age: 77
End: 2017-12-14

## 2017-12-15 ENCOUNTER — APPOINTMENT (OUTPATIENT)
Dept: CARDIAC REHAB | Facility: HOSPITAL | Age: 77
End: 2017-12-15

## 2017-12-15 ENCOUNTER — OFFICE VISIT (OUTPATIENT)
Dept: CARDIAC REHAB | Facility: HOSPITAL | Age: 77
End: 2017-12-15

## 2017-12-15 DIAGNOSIS — I21.4 NSTEMI (NON-ST ELEVATED MYOCARDIAL INFARCTION) (HCC): Primary | ICD-10-CM

## 2017-12-18 ENCOUNTER — OFFICE VISIT (OUTPATIENT)
Dept: CARDIAC REHAB | Facility: HOSPITAL | Age: 77
End: 2017-12-18

## 2017-12-18 ENCOUNTER — APPOINTMENT (OUTPATIENT)
Dept: CARDIAC REHAB | Facility: HOSPITAL | Age: 77
End: 2017-12-18

## 2017-12-18 DIAGNOSIS — I21.4 NSTEMI (NON-ST ELEVATED MYOCARDIAL INFARCTION) (HCC): Primary | ICD-10-CM

## 2017-12-19 ENCOUNTER — APPOINTMENT (OUTPATIENT)
Dept: CARDIAC REHAB | Facility: HOSPITAL | Age: 77
End: 2017-12-19

## 2017-12-20 ENCOUNTER — APPOINTMENT (OUTPATIENT)
Dept: CARDIAC REHAB | Facility: HOSPITAL | Age: 77
End: 2017-12-20

## 2017-12-21 ENCOUNTER — APPOINTMENT (OUTPATIENT)
Dept: CARDIAC REHAB | Facility: HOSPITAL | Age: 77
End: 2017-12-21

## 2017-12-22 ENCOUNTER — OFFICE VISIT (OUTPATIENT)
Dept: CARDIAC REHAB | Facility: HOSPITAL | Age: 77
End: 2017-12-22

## 2017-12-22 ENCOUNTER — APPOINTMENT (OUTPATIENT)
Dept: CARDIAC REHAB | Facility: HOSPITAL | Age: 77
End: 2017-12-22

## 2017-12-22 DIAGNOSIS — I21.4 NSTEMI (NON-ST ELEVATED MYOCARDIAL INFARCTION) (HCC): Primary | ICD-10-CM

## 2017-12-25 ENCOUNTER — APPOINTMENT (OUTPATIENT)
Dept: CARDIAC REHAB | Facility: HOSPITAL | Age: 77
End: 2017-12-25

## 2017-12-26 ENCOUNTER — APPOINTMENT (OUTPATIENT)
Dept: CARDIAC REHAB | Facility: HOSPITAL | Age: 77
End: 2017-12-26

## 2017-12-27 ENCOUNTER — APPOINTMENT (OUTPATIENT)
Dept: CARDIAC REHAB | Facility: HOSPITAL | Age: 77
End: 2017-12-27

## 2017-12-27 ENCOUNTER — OFFICE VISIT (OUTPATIENT)
Dept: CARDIAC REHAB | Facility: HOSPITAL | Age: 77
End: 2017-12-27

## 2017-12-27 DIAGNOSIS — I21.4 NSTEMI (NON-ST ELEVATED MYOCARDIAL INFARCTION) (HCC): Primary | ICD-10-CM

## 2017-12-27 DIAGNOSIS — I25.119 CORONARY ARTERY DISEASE WITH ANGINA PECTORIS, UNSPECIFIED VESSEL OR LESION TYPE, UNSPECIFIED WHETHER NATIVE OR TRANSPLANTED HEART (HCC): ICD-10-CM

## 2017-12-28 ENCOUNTER — APPOINTMENT (OUTPATIENT)
Dept: CARDIAC REHAB | Facility: HOSPITAL | Age: 77
End: 2017-12-28

## 2017-12-29 ENCOUNTER — APPOINTMENT (OUTPATIENT)
Dept: CARDIAC REHAB | Facility: HOSPITAL | Age: 77
End: 2017-12-29

## 2018-01-02 ENCOUNTER — APPOINTMENT (OUTPATIENT)
Dept: CARDIAC REHAB | Facility: HOSPITAL | Age: 78
End: 2018-01-02

## 2018-01-03 ENCOUNTER — APPOINTMENT (OUTPATIENT)
Dept: CARDIAC REHAB | Facility: HOSPITAL | Age: 78
End: 2018-01-03

## 2018-01-03 ENCOUNTER — OFFICE VISIT (OUTPATIENT)
Dept: CARDIAC REHAB | Facility: HOSPITAL | Age: 78
End: 2018-01-03

## 2018-01-03 DIAGNOSIS — I21.4 NSTEMI (NON-ST ELEVATED MYOCARDIAL INFARCTION) (HCC): Primary | ICD-10-CM

## 2018-01-04 ENCOUNTER — APPOINTMENT (OUTPATIENT)
Dept: CARDIAC REHAB | Facility: HOSPITAL | Age: 78
End: 2018-01-04

## 2018-01-05 ENCOUNTER — APPOINTMENT (OUTPATIENT)
Dept: CARDIAC REHAB | Facility: HOSPITAL | Age: 78
End: 2018-01-05

## 2018-01-08 ENCOUNTER — APPOINTMENT (OUTPATIENT)
Dept: CARDIAC REHAB | Facility: HOSPITAL | Age: 78
End: 2018-01-08

## 2018-01-09 ENCOUNTER — APPOINTMENT (OUTPATIENT)
Dept: CARDIAC REHAB | Facility: HOSPITAL | Age: 78
End: 2018-01-09

## 2018-01-10 ENCOUNTER — APPOINTMENT (OUTPATIENT)
Dept: CARDIAC REHAB | Facility: HOSPITAL | Age: 78
End: 2018-01-10

## 2018-01-11 ENCOUNTER — APPOINTMENT (OUTPATIENT)
Dept: CARDIAC REHAB | Facility: HOSPITAL | Age: 78
End: 2018-01-11

## 2018-01-12 ENCOUNTER — APPOINTMENT (OUTPATIENT)
Dept: CARDIAC REHAB | Facility: HOSPITAL | Age: 78
End: 2018-01-12

## 2018-01-15 ENCOUNTER — APPOINTMENT (OUTPATIENT)
Dept: CARDIAC REHAB | Facility: HOSPITAL | Age: 78
End: 2018-01-15

## 2018-01-16 ENCOUNTER — APPOINTMENT (OUTPATIENT)
Dept: CARDIAC REHAB | Facility: HOSPITAL | Age: 78
End: 2018-01-16

## 2018-01-17 ENCOUNTER — APPOINTMENT (OUTPATIENT)
Dept: CARDIAC REHAB | Facility: HOSPITAL | Age: 78
End: 2018-01-17

## 2018-01-18 ENCOUNTER — APPOINTMENT (OUTPATIENT)
Dept: CARDIAC REHAB | Facility: HOSPITAL | Age: 78
End: 2018-01-18

## 2018-01-19 ENCOUNTER — APPOINTMENT (OUTPATIENT)
Dept: CARDIAC REHAB | Facility: HOSPITAL | Age: 78
End: 2018-01-19

## 2018-01-22 ENCOUNTER — APPOINTMENT (OUTPATIENT)
Dept: CARDIAC REHAB | Facility: HOSPITAL | Age: 78
End: 2018-01-22

## 2018-01-23 ENCOUNTER — APPOINTMENT (OUTPATIENT)
Dept: CARDIAC REHAB | Facility: HOSPITAL | Age: 78
End: 2018-01-23

## 2018-01-24 ENCOUNTER — APPOINTMENT (OUTPATIENT)
Dept: CARDIAC REHAB | Facility: HOSPITAL | Age: 78
End: 2018-01-24

## 2018-01-25 ENCOUNTER — APPOINTMENT (OUTPATIENT)
Dept: CARDIAC REHAB | Facility: HOSPITAL | Age: 78
End: 2018-01-25

## 2018-01-26 ENCOUNTER — APPOINTMENT (OUTPATIENT)
Dept: CARDIAC REHAB | Facility: HOSPITAL | Age: 78
End: 2018-01-26

## 2018-01-29 ENCOUNTER — APPOINTMENT (OUTPATIENT)
Dept: CARDIAC REHAB | Facility: HOSPITAL | Age: 78
End: 2018-01-29

## 2018-01-30 ENCOUNTER — APPOINTMENT (OUTPATIENT)
Dept: CARDIAC REHAB | Facility: HOSPITAL | Age: 78
End: 2018-01-30

## 2018-01-31 ENCOUNTER — APPOINTMENT (OUTPATIENT)
Dept: CARDIAC REHAB | Facility: HOSPITAL | Age: 78
End: 2018-01-31

## 2018-02-01 ENCOUNTER — APPOINTMENT (OUTPATIENT)
Dept: CARDIAC REHAB | Facility: HOSPITAL | Age: 78
End: 2018-02-01

## 2018-02-02 ENCOUNTER — APPOINTMENT (OUTPATIENT)
Dept: CARDIAC REHAB | Facility: HOSPITAL | Age: 78
End: 2018-02-02

## 2018-02-05 ENCOUNTER — APPOINTMENT (OUTPATIENT)
Dept: CARDIAC REHAB | Facility: HOSPITAL | Age: 78
End: 2018-02-05

## 2018-02-06 ENCOUNTER — APPOINTMENT (OUTPATIENT)
Dept: CARDIAC REHAB | Facility: HOSPITAL | Age: 78
End: 2018-02-06

## 2018-02-07 ENCOUNTER — APPOINTMENT (OUTPATIENT)
Dept: CARDIAC REHAB | Facility: HOSPITAL | Age: 78
End: 2018-02-07

## 2018-02-08 ENCOUNTER — APPOINTMENT (OUTPATIENT)
Dept: CARDIAC REHAB | Facility: HOSPITAL | Age: 78
End: 2018-02-08

## 2018-02-09 ENCOUNTER — APPOINTMENT (OUTPATIENT)
Dept: CARDIAC REHAB | Facility: HOSPITAL | Age: 78
End: 2018-02-09

## 2018-02-12 ENCOUNTER — APPOINTMENT (OUTPATIENT)
Dept: CARDIAC REHAB | Facility: HOSPITAL | Age: 78
End: 2018-02-12

## 2018-02-13 ENCOUNTER — APPOINTMENT (OUTPATIENT)
Dept: CARDIAC REHAB | Facility: HOSPITAL | Age: 78
End: 2018-02-13

## 2018-02-14 ENCOUNTER — APPOINTMENT (OUTPATIENT)
Dept: CARDIAC REHAB | Facility: HOSPITAL | Age: 78
End: 2018-02-14

## 2018-02-15 ENCOUNTER — APPOINTMENT (OUTPATIENT)
Dept: CARDIAC REHAB | Facility: HOSPITAL | Age: 78
End: 2018-02-15

## 2018-02-16 ENCOUNTER — APPOINTMENT (OUTPATIENT)
Dept: CARDIAC REHAB | Facility: HOSPITAL | Age: 78
End: 2018-02-16

## 2018-02-19 ENCOUNTER — APPOINTMENT (OUTPATIENT)
Dept: CARDIAC REHAB | Facility: HOSPITAL | Age: 78
End: 2018-02-19

## 2018-02-20 ENCOUNTER — APPOINTMENT (OUTPATIENT)
Dept: CARDIAC REHAB | Facility: HOSPITAL | Age: 78
End: 2018-02-20

## 2018-02-21 ENCOUNTER — APPOINTMENT (OUTPATIENT)
Dept: CARDIAC REHAB | Facility: HOSPITAL | Age: 78
End: 2018-02-21

## 2018-02-22 ENCOUNTER — APPOINTMENT (OUTPATIENT)
Dept: CARDIAC REHAB | Facility: HOSPITAL | Age: 78
End: 2018-02-22

## 2018-02-23 ENCOUNTER — APPOINTMENT (OUTPATIENT)
Dept: CARDIAC REHAB | Facility: HOSPITAL | Age: 78
End: 2018-02-23

## 2018-02-26 ENCOUNTER — APPOINTMENT (OUTPATIENT)
Dept: CARDIAC REHAB | Facility: HOSPITAL | Age: 78
End: 2018-02-26

## 2018-02-28 ENCOUNTER — APPOINTMENT (OUTPATIENT)
Dept: CARDIAC REHAB | Facility: HOSPITAL | Age: 78
End: 2018-02-28

## 2018-03-02 ENCOUNTER — APPOINTMENT (OUTPATIENT)
Dept: CARDIAC REHAB | Facility: HOSPITAL | Age: 78
End: 2018-03-02

## 2018-03-05 ENCOUNTER — APPOINTMENT (OUTPATIENT)
Dept: CARDIAC REHAB | Facility: HOSPITAL | Age: 78
End: 2018-03-05

## 2018-03-07 ENCOUNTER — APPOINTMENT (OUTPATIENT)
Dept: CARDIAC REHAB | Facility: HOSPITAL | Age: 78
End: 2018-03-07

## 2018-03-09 ENCOUNTER — APPOINTMENT (OUTPATIENT)
Dept: CARDIAC REHAB | Facility: HOSPITAL | Age: 78
End: 2018-03-09

## 2018-03-12 ENCOUNTER — APPOINTMENT (OUTPATIENT)
Dept: CARDIAC REHAB | Facility: HOSPITAL | Age: 78
End: 2018-03-12

## 2018-03-14 ENCOUNTER — APPOINTMENT (OUTPATIENT)
Dept: CARDIAC REHAB | Facility: HOSPITAL | Age: 78
End: 2018-03-14

## 2018-03-16 ENCOUNTER — APPOINTMENT (OUTPATIENT)
Dept: CARDIAC REHAB | Facility: HOSPITAL | Age: 78
End: 2018-03-16

## 2018-03-19 ENCOUNTER — APPOINTMENT (OUTPATIENT)
Dept: CARDIAC REHAB | Facility: HOSPITAL | Age: 78
End: 2018-03-19

## 2018-03-21 ENCOUNTER — APPOINTMENT (OUTPATIENT)
Dept: CARDIAC REHAB | Facility: HOSPITAL | Age: 78
End: 2018-03-21

## 2018-03-23 ENCOUNTER — APPOINTMENT (OUTPATIENT)
Dept: CARDIAC REHAB | Facility: HOSPITAL | Age: 78
End: 2018-03-23

## 2018-03-26 ENCOUNTER — APPOINTMENT (OUTPATIENT)
Dept: CARDIAC REHAB | Facility: HOSPITAL | Age: 78
End: 2018-03-26

## 2018-03-28 ENCOUNTER — APPOINTMENT (OUTPATIENT)
Dept: CARDIAC REHAB | Facility: HOSPITAL | Age: 78
End: 2018-03-28

## 2018-03-30 ENCOUNTER — APPOINTMENT (OUTPATIENT)
Dept: CARDIAC REHAB | Facility: HOSPITAL | Age: 78
End: 2018-03-30

## 2018-04-02 ENCOUNTER — APPOINTMENT (OUTPATIENT)
Dept: CARDIAC REHAB | Facility: HOSPITAL | Age: 78
End: 2018-04-02

## 2018-04-04 ENCOUNTER — APPOINTMENT (OUTPATIENT)
Dept: CARDIAC REHAB | Facility: HOSPITAL | Age: 78
End: 2018-04-04

## 2018-04-06 ENCOUNTER — APPOINTMENT (OUTPATIENT)
Dept: CARDIAC REHAB | Facility: HOSPITAL | Age: 78
End: 2018-04-06

## 2018-04-09 ENCOUNTER — APPOINTMENT (OUTPATIENT)
Dept: CARDIAC REHAB | Facility: HOSPITAL | Age: 78
End: 2018-04-09

## 2018-04-11 ENCOUNTER — APPOINTMENT (OUTPATIENT)
Dept: CARDIAC REHAB | Facility: HOSPITAL | Age: 78
End: 2018-04-11

## 2018-04-13 ENCOUNTER — APPOINTMENT (OUTPATIENT)
Dept: CARDIAC REHAB | Facility: HOSPITAL | Age: 78
End: 2018-04-13

## 2018-04-16 ENCOUNTER — APPOINTMENT (OUTPATIENT)
Dept: CARDIAC REHAB | Facility: HOSPITAL | Age: 78
End: 2018-04-16

## 2018-04-18 ENCOUNTER — APPOINTMENT (OUTPATIENT)
Dept: CARDIAC REHAB | Facility: HOSPITAL | Age: 78
End: 2018-04-18

## 2018-04-20 ENCOUNTER — APPOINTMENT (OUTPATIENT)
Dept: CARDIAC REHAB | Facility: HOSPITAL | Age: 78
End: 2018-04-20

## 2018-04-23 ENCOUNTER — APPOINTMENT (OUTPATIENT)
Dept: CARDIAC REHAB | Facility: HOSPITAL | Age: 78
End: 2018-04-23

## 2018-04-25 ENCOUNTER — APPOINTMENT (OUTPATIENT)
Dept: CARDIAC REHAB | Facility: HOSPITAL | Age: 78
End: 2018-04-25

## 2018-04-27 ENCOUNTER — APPOINTMENT (OUTPATIENT)
Dept: CARDIAC REHAB | Facility: HOSPITAL | Age: 78
End: 2018-04-27

## 2018-04-30 ENCOUNTER — APPOINTMENT (OUTPATIENT)
Dept: CARDIAC REHAB | Facility: HOSPITAL | Age: 78
End: 2018-04-30

## 2018-05-02 ENCOUNTER — APPOINTMENT (OUTPATIENT)
Dept: CARDIAC REHAB | Facility: HOSPITAL | Age: 78
End: 2018-05-02

## 2018-05-04 ENCOUNTER — APPOINTMENT (OUTPATIENT)
Dept: CARDIAC REHAB | Facility: HOSPITAL | Age: 78
End: 2018-05-04

## 2018-05-07 ENCOUNTER — APPOINTMENT (OUTPATIENT)
Dept: CARDIAC REHAB | Facility: HOSPITAL | Age: 78
End: 2018-05-07

## 2018-05-09 ENCOUNTER — APPOINTMENT (OUTPATIENT)
Dept: CARDIAC REHAB | Facility: HOSPITAL | Age: 78
End: 2018-05-09

## 2018-05-11 ENCOUNTER — APPOINTMENT (OUTPATIENT)
Dept: CARDIAC REHAB | Facility: HOSPITAL | Age: 78
End: 2018-05-11

## 2018-05-14 ENCOUNTER — APPOINTMENT (OUTPATIENT)
Dept: CARDIAC REHAB | Facility: HOSPITAL | Age: 78
End: 2018-05-14

## 2018-05-16 ENCOUNTER — APPOINTMENT (OUTPATIENT)
Dept: CARDIAC REHAB | Facility: HOSPITAL | Age: 78
End: 2018-05-16

## 2018-05-18 ENCOUNTER — APPOINTMENT (OUTPATIENT)
Dept: CARDIAC REHAB | Facility: HOSPITAL | Age: 78
End: 2018-05-18

## 2018-05-21 ENCOUNTER — APPOINTMENT (OUTPATIENT)
Dept: CARDIAC REHAB | Facility: HOSPITAL | Age: 78
End: 2018-05-21

## 2018-05-23 ENCOUNTER — APPOINTMENT (OUTPATIENT)
Dept: CARDIAC REHAB | Facility: HOSPITAL | Age: 78
End: 2018-05-23

## 2018-05-25 ENCOUNTER — APPOINTMENT (OUTPATIENT)
Dept: CARDIAC REHAB | Facility: HOSPITAL | Age: 78
End: 2018-05-25

## 2018-05-30 ENCOUNTER — APPOINTMENT (OUTPATIENT)
Dept: CARDIAC REHAB | Facility: HOSPITAL | Age: 78
End: 2018-05-30

## 2018-06-01 ENCOUNTER — APPOINTMENT (OUTPATIENT)
Dept: CARDIAC REHAB | Facility: HOSPITAL | Age: 78
End: 2018-06-01

## 2018-06-04 ENCOUNTER — APPOINTMENT (OUTPATIENT)
Dept: CARDIAC REHAB | Facility: HOSPITAL | Age: 78
End: 2018-06-04

## 2018-06-06 ENCOUNTER — APPOINTMENT (OUTPATIENT)
Dept: CARDIAC REHAB | Facility: HOSPITAL | Age: 78
End: 2018-06-06

## 2018-06-08 ENCOUNTER — APPOINTMENT (OUTPATIENT)
Dept: CARDIAC REHAB | Facility: HOSPITAL | Age: 78
End: 2018-06-08

## 2018-06-11 ENCOUNTER — APPOINTMENT (OUTPATIENT)
Dept: CARDIAC REHAB | Facility: HOSPITAL | Age: 78
End: 2018-06-11

## 2018-06-13 ENCOUNTER — APPOINTMENT (OUTPATIENT)
Dept: CARDIAC REHAB | Facility: HOSPITAL | Age: 78
End: 2018-06-13

## 2018-06-15 ENCOUNTER — APPOINTMENT (OUTPATIENT)
Dept: CARDIAC REHAB | Facility: HOSPITAL | Age: 78
End: 2018-06-15

## 2018-06-18 ENCOUNTER — APPOINTMENT (OUTPATIENT)
Dept: CARDIAC REHAB | Facility: HOSPITAL | Age: 78
End: 2018-06-18

## 2018-06-20 ENCOUNTER — APPOINTMENT (OUTPATIENT)
Dept: CARDIAC REHAB | Facility: HOSPITAL | Age: 78
End: 2018-06-20

## 2018-06-22 ENCOUNTER — APPOINTMENT (OUTPATIENT)
Dept: CARDIAC REHAB | Facility: HOSPITAL | Age: 78
End: 2018-06-22

## 2018-06-25 ENCOUNTER — APPOINTMENT (OUTPATIENT)
Dept: CARDIAC REHAB | Facility: HOSPITAL | Age: 78
End: 2018-06-25

## 2018-06-27 ENCOUNTER — APPOINTMENT (OUTPATIENT)
Dept: CARDIAC REHAB | Facility: HOSPITAL | Age: 78
End: 2018-06-27

## 2018-06-29 ENCOUNTER — APPOINTMENT (OUTPATIENT)
Dept: CARDIAC REHAB | Facility: HOSPITAL | Age: 78
End: 2018-06-29

## 2018-07-02 ENCOUNTER — APPOINTMENT (OUTPATIENT)
Dept: CARDIAC REHAB | Facility: HOSPITAL | Age: 78
End: 2018-07-02

## 2018-07-06 ENCOUNTER — APPOINTMENT (OUTPATIENT)
Dept: CARDIAC REHAB | Facility: HOSPITAL | Age: 78
End: 2018-07-06

## 2018-07-09 ENCOUNTER — APPOINTMENT (OUTPATIENT)
Dept: CARDIAC REHAB | Facility: HOSPITAL | Age: 78
End: 2018-07-09

## 2018-07-11 ENCOUNTER — APPOINTMENT (OUTPATIENT)
Dept: CARDIAC REHAB | Facility: HOSPITAL | Age: 78
End: 2018-07-11

## 2018-07-13 ENCOUNTER — APPOINTMENT (OUTPATIENT)
Dept: CARDIAC REHAB | Facility: HOSPITAL | Age: 78
End: 2018-07-13

## 2018-07-16 ENCOUNTER — APPOINTMENT (OUTPATIENT)
Dept: CARDIAC REHAB | Facility: HOSPITAL | Age: 78
End: 2018-07-16

## 2018-07-18 ENCOUNTER — APPOINTMENT (OUTPATIENT)
Dept: CARDIAC REHAB | Facility: HOSPITAL | Age: 78
End: 2018-07-18

## 2018-07-20 ENCOUNTER — APPOINTMENT (OUTPATIENT)
Dept: CARDIAC REHAB | Facility: HOSPITAL | Age: 78
End: 2018-07-20

## 2018-07-23 ENCOUNTER — APPOINTMENT (OUTPATIENT)
Dept: CARDIAC REHAB | Facility: HOSPITAL | Age: 78
End: 2018-07-23

## 2018-07-25 ENCOUNTER — APPOINTMENT (OUTPATIENT)
Dept: CARDIAC REHAB | Facility: HOSPITAL | Age: 78
End: 2018-07-25

## 2018-07-27 ENCOUNTER — APPOINTMENT (OUTPATIENT)
Dept: CARDIAC REHAB | Facility: HOSPITAL | Age: 78
End: 2018-07-27

## 2018-07-30 ENCOUNTER — APPOINTMENT (OUTPATIENT)
Dept: CARDIAC REHAB | Facility: HOSPITAL | Age: 78
End: 2018-07-30

## 2018-08-01 ENCOUNTER — APPOINTMENT (OUTPATIENT)
Dept: CARDIAC REHAB | Facility: HOSPITAL | Age: 78
End: 2018-08-01

## 2018-08-03 ENCOUNTER — APPOINTMENT (OUTPATIENT)
Dept: CARDIAC REHAB | Facility: HOSPITAL | Age: 78
End: 2018-08-03

## 2018-08-06 ENCOUNTER — APPOINTMENT (OUTPATIENT)
Dept: CARDIAC REHAB | Facility: HOSPITAL | Age: 78
End: 2018-08-06

## 2018-08-08 ENCOUNTER — APPOINTMENT (OUTPATIENT)
Dept: CARDIAC REHAB | Facility: HOSPITAL | Age: 78
End: 2018-08-08

## 2018-08-10 ENCOUNTER — APPOINTMENT (OUTPATIENT)
Dept: CARDIAC REHAB | Facility: HOSPITAL | Age: 78
End: 2018-08-10

## 2018-08-13 ENCOUNTER — APPOINTMENT (OUTPATIENT)
Dept: CARDIAC REHAB | Facility: HOSPITAL | Age: 78
End: 2018-08-13

## 2018-08-15 ENCOUNTER — APPOINTMENT (OUTPATIENT)
Dept: CARDIAC REHAB | Facility: HOSPITAL | Age: 78
End: 2018-08-15

## 2018-08-17 ENCOUNTER — APPOINTMENT (OUTPATIENT)
Dept: CARDIAC REHAB | Facility: HOSPITAL | Age: 78
End: 2018-08-17

## 2018-08-20 ENCOUNTER — APPOINTMENT (OUTPATIENT)
Dept: CARDIAC REHAB | Facility: HOSPITAL | Age: 78
End: 2018-08-20

## 2018-08-22 ENCOUNTER — APPOINTMENT (OUTPATIENT)
Dept: CARDIAC REHAB | Facility: HOSPITAL | Age: 78
End: 2018-08-22

## 2018-08-24 ENCOUNTER — APPOINTMENT (OUTPATIENT)
Dept: CARDIAC REHAB | Facility: HOSPITAL | Age: 78
End: 2018-08-24

## 2018-08-27 ENCOUNTER — APPOINTMENT (OUTPATIENT)
Dept: CARDIAC REHAB | Facility: HOSPITAL | Age: 78
End: 2018-08-27

## 2018-08-29 ENCOUNTER — APPOINTMENT (OUTPATIENT)
Dept: CARDIAC REHAB | Facility: HOSPITAL | Age: 78
End: 2018-08-29

## 2018-08-31 ENCOUNTER — APPOINTMENT (OUTPATIENT)
Dept: CARDIAC REHAB | Facility: HOSPITAL | Age: 78
End: 2018-08-31

## 2018-09-05 ENCOUNTER — APPOINTMENT (OUTPATIENT)
Dept: CARDIAC REHAB | Facility: HOSPITAL | Age: 78
End: 2018-09-05

## 2018-09-07 ENCOUNTER — APPOINTMENT (OUTPATIENT)
Dept: CARDIAC REHAB | Facility: HOSPITAL | Age: 78
End: 2018-09-07

## 2018-09-10 ENCOUNTER — APPOINTMENT (OUTPATIENT)
Dept: CARDIAC REHAB | Facility: HOSPITAL | Age: 78
End: 2018-09-10

## 2018-09-12 ENCOUNTER — APPOINTMENT (OUTPATIENT)
Dept: CARDIAC REHAB | Facility: HOSPITAL | Age: 78
End: 2018-09-12

## 2018-09-14 ENCOUNTER — APPOINTMENT (OUTPATIENT)
Dept: CARDIAC REHAB | Facility: HOSPITAL | Age: 78
End: 2018-09-14

## 2018-09-17 ENCOUNTER — APPOINTMENT (OUTPATIENT)
Dept: CARDIAC REHAB | Facility: HOSPITAL | Age: 78
End: 2018-09-17

## 2018-09-19 ENCOUNTER — APPOINTMENT (OUTPATIENT)
Dept: CARDIAC REHAB | Facility: HOSPITAL | Age: 78
End: 2018-09-19

## 2018-09-21 ENCOUNTER — APPOINTMENT (OUTPATIENT)
Dept: CARDIAC REHAB | Facility: HOSPITAL | Age: 78
End: 2018-09-21

## 2018-09-24 ENCOUNTER — APPOINTMENT (OUTPATIENT)
Dept: CARDIAC REHAB | Facility: HOSPITAL | Age: 78
End: 2018-09-24

## 2018-09-26 ENCOUNTER — APPOINTMENT (OUTPATIENT)
Dept: CARDIAC REHAB | Facility: HOSPITAL | Age: 78
End: 2018-09-26

## 2018-09-28 ENCOUNTER — APPOINTMENT (OUTPATIENT)
Dept: CARDIAC REHAB | Facility: HOSPITAL | Age: 78
End: 2018-09-28

## 2018-10-01 ENCOUNTER — APPOINTMENT (OUTPATIENT)
Dept: CARDIAC REHAB | Facility: HOSPITAL | Age: 78
End: 2018-10-01

## 2018-10-03 ENCOUNTER — APPOINTMENT (OUTPATIENT)
Dept: CARDIAC REHAB | Facility: HOSPITAL | Age: 78
End: 2018-10-03

## 2018-10-05 ENCOUNTER — APPOINTMENT (OUTPATIENT)
Dept: CARDIAC REHAB | Facility: HOSPITAL | Age: 78
End: 2018-10-05

## 2018-10-08 ENCOUNTER — APPOINTMENT (OUTPATIENT)
Dept: CARDIAC REHAB | Facility: HOSPITAL | Age: 78
End: 2018-10-08

## 2018-10-10 ENCOUNTER — APPOINTMENT (OUTPATIENT)
Dept: CARDIAC REHAB | Facility: HOSPITAL | Age: 78
End: 2018-10-10

## 2018-10-12 ENCOUNTER — APPOINTMENT (OUTPATIENT)
Dept: CARDIAC REHAB | Facility: HOSPITAL | Age: 78
End: 2018-10-12

## 2018-10-15 ENCOUNTER — APPOINTMENT (OUTPATIENT)
Dept: CARDIAC REHAB | Facility: HOSPITAL | Age: 78
End: 2018-10-15

## 2018-10-17 ENCOUNTER — APPOINTMENT (OUTPATIENT)
Dept: CARDIAC REHAB | Facility: HOSPITAL | Age: 78
End: 2018-10-17

## 2018-10-19 ENCOUNTER — APPOINTMENT (OUTPATIENT)
Dept: CARDIAC REHAB | Facility: HOSPITAL | Age: 78
End: 2018-10-19

## 2018-10-22 ENCOUNTER — APPOINTMENT (OUTPATIENT)
Dept: CARDIAC REHAB | Facility: HOSPITAL | Age: 78
End: 2018-10-22

## 2018-10-24 ENCOUNTER — APPOINTMENT (OUTPATIENT)
Dept: CARDIAC REHAB | Facility: HOSPITAL | Age: 78
End: 2018-10-24

## 2018-10-26 ENCOUNTER — APPOINTMENT (OUTPATIENT)
Dept: CARDIAC REHAB | Facility: HOSPITAL | Age: 78
End: 2018-10-26

## 2018-10-29 ENCOUNTER — APPOINTMENT (OUTPATIENT)
Dept: CARDIAC REHAB | Facility: HOSPITAL | Age: 78
End: 2018-10-29

## 2018-10-31 ENCOUNTER — APPOINTMENT (OUTPATIENT)
Dept: CARDIAC REHAB | Facility: HOSPITAL | Age: 78
End: 2018-10-31

## 2018-11-02 ENCOUNTER — APPOINTMENT (OUTPATIENT)
Dept: CARDIAC REHAB | Facility: HOSPITAL | Age: 78
End: 2018-11-02

## 2018-11-05 ENCOUNTER — APPOINTMENT (OUTPATIENT)
Dept: CARDIAC REHAB | Facility: HOSPITAL | Age: 78
End: 2018-11-05

## 2018-11-07 ENCOUNTER — APPOINTMENT (OUTPATIENT)
Dept: CARDIAC REHAB | Facility: HOSPITAL | Age: 78
End: 2018-11-07

## 2018-11-09 ENCOUNTER — APPOINTMENT (OUTPATIENT)
Dept: CARDIAC REHAB | Facility: HOSPITAL | Age: 78
End: 2018-11-09

## 2018-11-12 ENCOUNTER — APPOINTMENT (OUTPATIENT)
Dept: CARDIAC REHAB | Facility: HOSPITAL | Age: 78
End: 2018-11-12

## 2018-11-14 ENCOUNTER — APPOINTMENT (OUTPATIENT)
Dept: CARDIAC REHAB | Facility: HOSPITAL | Age: 78
End: 2018-11-14

## 2018-11-16 ENCOUNTER — APPOINTMENT (OUTPATIENT)
Dept: CARDIAC REHAB | Facility: HOSPITAL | Age: 78
End: 2018-11-16

## 2018-11-19 ENCOUNTER — APPOINTMENT (OUTPATIENT)
Dept: CARDIAC REHAB | Facility: HOSPITAL | Age: 78
End: 2018-11-19

## 2018-11-21 ENCOUNTER — APPOINTMENT (OUTPATIENT)
Dept: CARDIAC REHAB | Facility: HOSPITAL | Age: 78
End: 2018-11-21

## 2018-11-23 ENCOUNTER — APPOINTMENT (OUTPATIENT)
Dept: CARDIAC REHAB | Facility: HOSPITAL | Age: 78
End: 2018-11-23

## 2018-11-26 ENCOUNTER — APPOINTMENT (OUTPATIENT)
Dept: CARDIAC REHAB | Facility: HOSPITAL | Age: 78
End: 2018-11-26

## 2018-11-28 ENCOUNTER — APPOINTMENT (OUTPATIENT)
Dept: CARDIAC REHAB | Facility: HOSPITAL | Age: 78
End: 2018-11-28

## 2018-11-30 ENCOUNTER — APPOINTMENT (OUTPATIENT)
Dept: CARDIAC REHAB | Facility: HOSPITAL | Age: 78
End: 2018-11-30

## 2018-12-03 ENCOUNTER — APPOINTMENT (OUTPATIENT)
Dept: CARDIAC REHAB | Facility: HOSPITAL | Age: 78
End: 2018-12-03

## 2018-12-05 ENCOUNTER — APPOINTMENT (OUTPATIENT)
Dept: CARDIAC REHAB | Facility: HOSPITAL | Age: 78
End: 2018-12-05

## 2018-12-07 ENCOUNTER — APPOINTMENT (OUTPATIENT)
Dept: CARDIAC REHAB | Facility: HOSPITAL | Age: 78
End: 2018-12-07

## 2019-02-24 ENCOUNTER — APPOINTMENT (OUTPATIENT)
Dept: GENERAL RADIOLOGY | Facility: HOSPITAL | Age: 79
End: 2019-02-24

## 2019-02-24 ENCOUNTER — HOSPITAL ENCOUNTER (EMERGENCY)
Facility: HOSPITAL | Age: 79
Discharge: ANOTHER HOSPITAL | End: 2019-02-24
Attending: EMERGENCY MEDICINE | Admitting: EMERGENCY MEDICINE

## 2019-02-24 VITALS
WEIGHT: 196 LBS | HEIGHT: 75 IN | TEMPERATURE: 97.4 F | DIASTOLIC BLOOD PRESSURE: 84 MMHG | OXYGEN SATURATION: 96 % | RESPIRATION RATE: 18 BRPM | BODY MASS INDEX: 24.37 KG/M2 | HEART RATE: 58 BPM | SYSTOLIC BLOOD PRESSURE: 142 MMHG

## 2019-02-24 DIAGNOSIS — R00.0 WIDE-COMPLEX TACHYCARDIA: Primary | ICD-10-CM

## 2019-02-24 DIAGNOSIS — Z45.02 DEFIBRILLATOR DISCHARGE: ICD-10-CM

## 2019-02-24 LAB
ALBUMIN SERPL-MCNC: 4.4 G/DL (ref 3.5–5)
ALBUMIN/GLOB SERPL: 1.4 G/DL (ref 1–2)
ALP SERPL-CCNC: 64 U/L (ref 38–126)
ALT SERPL W P-5'-P-CCNC: 27 U/L (ref 13–69)
ANION GAP SERPL CALCULATED.3IONS-SCNC: 11.1 MMOL/L (ref 10–20)
AST SERPL-CCNC: 23 U/L (ref 15–46)
BASOPHILS # BLD AUTO: 0.02 10*3/MM3 (ref 0–0.2)
BASOPHILS NFR BLD AUTO: 0.3 % (ref 0–2.5)
BILIRUB SERPL-MCNC: 0.7 MG/DL (ref 0.2–1.3)
BUN BLD-MCNC: 18 MG/DL (ref 7–20)
BUN/CREAT SERPL: 12.9 (ref 6.3–21.9)
CALCIUM SPEC-SCNC: 9.6 MG/DL (ref 8.4–10.2)
CHLORIDE SERPL-SCNC: 104 MMOL/L (ref 98–107)
CO2 SERPL-SCNC: 29 MMOL/L (ref 26–30)
CREAT BLD-MCNC: 1.4 MG/DL (ref 0.6–1.3)
DEPRECATED RDW RBC AUTO: 45 FL (ref 37–54)
EOSINOPHIL # BLD AUTO: 0.14 10*3/MM3 (ref 0–0.7)
EOSINOPHIL NFR BLD AUTO: 2.2 % (ref 0–7)
ERYTHROCYTE [DISTWIDTH] IN BLOOD BY AUTOMATED COUNT: 14.7 % (ref 11.5–14.5)
GFR SERPL CREATININE-BSD FRML MDRD: 49 ML/MIN/1.73
GLOBULIN UR ELPH-MCNC: 3.1 GM/DL
GLUCOSE BLD-MCNC: 116 MG/DL (ref 74–98)
HCT VFR BLD AUTO: 41.1 % (ref 42–52)
HGB BLD-MCNC: 12.7 G/DL (ref 14–18)
IMM GRANULOCYTES # BLD AUTO: 0.02 10*3/MM3 (ref 0–0.06)
IMM GRANULOCYTES NFR BLD AUTO: 0.3 % (ref 0–0.6)
INR PPP: 2.47 (ref 0.9–1.1)
LIPASE SERPL-CCNC: 138 U/L (ref 23–300)
LYMPHOCYTES # BLD AUTO: 1.23 10*3/MM3 (ref 0.6–3.4)
LYMPHOCYTES NFR BLD AUTO: 19.3 % (ref 10–50)
MCH RBC QN AUTO: 26.1 PG (ref 27–31)
MCHC RBC AUTO-ENTMCNC: 30.9 G/DL (ref 30–37)
MCV RBC AUTO: 84.4 FL (ref 80–94)
MONOCYTES # BLD AUTO: 0.57 10*3/MM3 (ref 0–0.9)
MONOCYTES NFR BLD AUTO: 8.9 % (ref 0–12)
NEUTROPHILS # BLD AUTO: 4.39 10*3/MM3 (ref 2–6.9)
NEUTROPHILS NFR BLD AUTO: 69 % (ref 37–80)
NRBC BLD AUTO-RTO: 0 /100 WBC (ref 0–0)
NT-PROBNP SERPL-MCNC: 1270 PG/ML (ref 0–450)
PLATELET # BLD AUTO: 233 10*3/MM3 (ref 130–400)
PMV BLD AUTO: 10.5 FL (ref 6–12)
POTASSIUM BLD-SCNC: 4.1 MMOL/L (ref 3.5–5.1)
PROT SERPL-MCNC: 7.5 G/DL (ref 6.3–8.2)
PROTHROMBIN TIME: 27.1 SECONDS (ref 9.3–12.1)
RBC # BLD AUTO: 4.87 10*6/MM3 (ref 4.7–6.1)
SODIUM BLD-SCNC: 140 MMOL/L (ref 137–145)
TROPONIN I SERPL-MCNC: 0.02 NG/ML (ref 0–0.03)
WBC NRBC COR # BLD: 6.37 10*3/MM3 (ref 4.8–10.8)

## 2019-02-24 PROCEDURE — 84484 ASSAY OF TROPONIN QUANT: CPT | Performed by: EMERGENCY MEDICINE

## 2019-02-24 PROCEDURE — 71045 X-RAY EXAM CHEST 1 VIEW: CPT

## 2019-02-24 PROCEDURE — 25010000002 AMIODARONE IN DEXTROSE 5% 150-4.21 MG/100ML-% SOLUTION: Performed by: EMERGENCY MEDICINE

## 2019-02-24 PROCEDURE — 85610 PROTHROMBIN TIME: CPT | Performed by: EMERGENCY MEDICINE

## 2019-02-24 PROCEDURE — 96365 THER/PROPH/DIAG IV INF INIT: CPT

## 2019-02-24 PROCEDURE — 85025 COMPLETE CBC W/AUTO DIFF WBC: CPT | Performed by: EMERGENCY MEDICINE

## 2019-02-24 PROCEDURE — 25010000002 AMIODARONE IN DEXTROSE 5% 360-4.14 MG/200ML-% SOLUTION: Performed by: EMERGENCY MEDICINE

## 2019-02-24 PROCEDURE — 80053 COMPREHEN METABOLIC PANEL: CPT | Performed by: EMERGENCY MEDICINE

## 2019-02-24 PROCEDURE — 83880 ASSAY OF NATRIURETIC PEPTIDE: CPT | Performed by: EMERGENCY MEDICINE

## 2019-02-24 PROCEDURE — 83690 ASSAY OF LIPASE: CPT | Performed by: EMERGENCY MEDICINE

## 2019-02-24 PROCEDURE — 99291 CRITICAL CARE FIRST HOUR: CPT

## 2019-02-24 RX ORDER — AMIODARONE HYDROCHLORIDE 50 MG/ML
150 INJECTION, SOLUTION INTRAVENOUS ONCE
Status: DISCONTINUED | OUTPATIENT
Start: 2019-02-24 | End: 2019-02-24

## 2019-02-24 RX ADMIN — AMIODARONE HYDROCHLORIDE 1 MG/MIN: 1.8 INJECTION, SOLUTION INTRAVENOUS at 14:50

## 2019-02-24 RX ADMIN — AMIODARONE HYDROCHLORIDE 150 MG: 1.5 INJECTION, SOLUTION INTRAVENOUS at 14:29

## 2019-02-24 RX ADMIN — AMIODARONE HYDROCHLORIDE 150 MG: 1.5 INJECTION, SOLUTION INTRAVENOUS at 15:04

## 2019-05-23 ENCOUNTER — OUTSIDE FACILITY SERVICE (OUTPATIENT)
Dept: FAMILY MEDICINE CLINIC | Facility: CLINIC | Age: 79
End: 2019-05-23

## 2019-05-23 PROCEDURE — G0180 MD CERTIFICATION HHA PATIENT: HCPCS | Performed by: FAMILY MEDICINE

## 2019-10-28 ENCOUNTER — NURSING HOME (OUTPATIENT)
Dept: FAMILY MEDICINE CLINIC | Facility: CLINIC | Age: 79
End: 2019-10-28

## 2019-10-28 DIAGNOSIS — G93.5 ARNOLD-CHIARI MALFORMATION, TYPE I (HCC): ICD-10-CM

## 2019-10-28 DIAGNOSIS — Z98.890 STATUS POST LUMBAR SPINE SURGERY FOR DECOMPRESSION OF SPINAL CORD: ICD-10-CM

## 2019-10-28 DIAGNOSIS — Z98.1 S/P LUMBAR FUSION: ICD-10-CM

## 2019-10-28 DIAGNOSIS — G40.209 PARTIAL SYMPTOMATIC EPILEPSY WITH COMPLEX PARTIAL SEIZURES, NOT INTRACTABLE, WITHOUT STATUS EPILEPTICUS (HCC): ICD-10-CM

## 2019-10-28 DIAGNOSIS — Z86.73 H/O: CVA (CEREBROVASCULAR ACCIDENT): ICD-10-CM

## 2019-10-28 PROCEDURE — 99308 SBSQ NF CARE LOW MDM 20: CPT | Performed by: NURSE PRACTITIONER

## 2019-10-29 PROBLEM — I63.9 CVA (CEREBRAL VASCULAR ACCIDENT) (HCC): Status: RESOLVED | Noted: 2017-06-01 | Resolved: 2019-10-29

## 2019-10-30 RX ORDER — TRAMADOL HYDROCHLORIDE 50 MG/1
50 TABLET ORAL EVERY 6 HOURS PRN
Qty: 120 TABLET | Refills: 0 | Status: ON HOLD | OUTPATIENT
Start: 2019-10-30 | End: 2020-07-04

## 2019-10-30 NOTE — PROGRESS NOTES
Nursing Home Follow Up Note      Weston Hernandez DO []   MADELEINE Lazo [x]  [address] [facility], [place] [address]  Phone: [phone]  Fax: [phone] Luis Eduardo Wyatt MD []    Anthony Leiva DO []   [address] [place] [address]  Phone: [phone]  Fax: [phone]     PATIENT NAME: Pancho Bonner                                                                          YOB: 1940           DATE OF SERVICE: 10/28/2019  FACILITY:  [][place]   [] [place]   [] [facility]   [x] [facility]   [] Other ______________________________________________________________________      CHIEF COMPLAINT:  Post operative pain      HISTORY OF PRESENT ILLNESS:   Patient is post op back surgery. He is taking Tramadol 50 mg q 6 hours prn but wants it scheduled, due to freqent pain. He is working with PT every day and doing well.     PAST MEDICAL & SURGICAL HISTORY:   Past Medical History:   Diagnosis Date   • Anxiety    • Arnold-Chiari malformation, type I (CMS/HCC)     followed by Dr. Martinez, but last note available was 2014   • BPH (benign prostatic hyperplasia)    • Chronic Pleural effusion on right    • Complex partial seizure (CMS/HCC)     followed by Dr. Martinez, but last note available was 2014   • Congestive heart failure (CMS/HCC)     follows with Dr. Pompa at [facility], EF 30%   • Coronary artery disease     on ASA/plavix   • CVA (cerebral vascular accident) (CMS/HCC)     apical thrombus on chronic coumadin   • Diabetes mellitus (CMS/HCC)    • ED (erectile dysfunction)    • GERD (gastroesophageal reflux disease)    • Hyperlipidemia    • Hypertension    • Ischemic cardiomyopathy    • Myocardial infarction (CMS/HCC)    • Non-sustained ventricular tachycardia (CMS/HCC)    • Renal disorder       Past Surgical History:   Procedure Laterality Date   • CARDIAC CATHETERIZATION  02/2016    [facility], 3 vessel disease, patent LIMA to LAD bypass graft    • CARDIAC CATHETERIZATION  05/25/2017    [facility],  severe 3 vessel disease, patent LIMA to LAD bypass graft, recommend medical management    • CARDIAC DEFIBRILLATOR PLACEMENT  08/2009    w/ PPM Medtronic (DDD)   • COLONOSCOPY W/ BIOPSIES     • CORONARY ANGIOPLASTY WITH STENT PLACEMENT     • CORONARY ARTERY BYPASS GRAFT  2000    [facility]   • INSERT / REPLACE / REMOVE PACEMAKER  05/26/2017    Medtronic generator change, Dr. Ho, [facility]    • INTERVENTIONAL RADIOLOGY PROCEDURE Bilateral 6/1/2017    Procedure: Carotid Cerebral Angiogram;  Surgeon: Wil Rodrigez MD;  Location: [facility] INVASIVE LOCATION;  Service:          MEDICATIONS:  I have reviewed and reconciled the patients medication list in the patients chart at the skilled nursing facility today.      ALLERGIES:    Allergies   Allergen Reactions   • Latex Rash         SOCIAL HISTORY:    Social History     Socioeconomic History   • Marital status:      Spouse name: Not on file   • Number of children: Not on file   • Years of education: Not on file   • Highest education level: Not on file   Occupational History     Employer: RETIRED   Tobacco Use   • Smoking status: Never Smoker   • Smokeless tobacco: Never Used   Substance and Sexual Activity   • Alcohol use: No   • Drug use: No   • Sexual activity: Defer     Comment:        FAMILY HISTORY:    Family History   Problem Relation Age of Onset   • Diabetes Mother    • Hypertension Mother    • Heart disease Mother    • Heart disease Father    • Stroke Father    • Heart disease Sister    • Diabetes Other    • Heart disease Other    • Hypertension Other    • Stroke Other        REVIEW OF SYSTEMS:    Review of Systems   Constitutional: Negative for activity change, appetite change, chills, diaphoresis, fatigue, fever, unexpected weight gain and unexpected weight loss.   HENT: Negative for congestion, ear pain, mouth sores, nosebleeds, postnasal drip, rhinorrhea, sinus pressure, sneezing, sore throat, swollen glands and trouble swallowing.    Eyes:  Negative for blurred vision, pain, discharge, redness, itching and visual disturbance.   Respiratory: Negative for apnea, cough, choking, chest tightness, shortness of breath, wheezing and stridor.    Cardiovascular: Negative for chest pain, palpitations and leg swelling.   Gastrointestinal: Negative for abdominal distention, abdominal pain, blood in stool, constipation, diarrhea, nausea, vomiting, GERD and indigestion.   Endocrine: Negative for polydipsia and polyuria.   Genitourinary: Negative for decreased urine volume, difficulty urinating, dysuria, flank pain, frequency, hematuria, urgency and urinary incontinence.   Musculoskeletal: Positive for arthralgias and back pain. Negative for gait problem, joint swelling and myalgias.   Skin: Negative for color change, dry skin, rash and skin lesions.   Allergic/Immunologic: Negative for environmental allergies.   Neurological: Positive for memory problem. Negative for dizziness, seizures, speech difficulty, weakness and confusion.   Psychiatric/Behavioral: Negative for behavioral problems, dysphoric mood, hallucinations, sleep disturbance and depressed mood. The patient is not nervous/anxious.          PHYSICAL EXAMINATION:   VITAL SIGNS: BP: 118/68    HR: 60      02: 97%      RR: 18      T: 99.8     Wt: 188    Physical Exam   Constitutional: He appears well-developed and well-nourished.   HENT:   Head: Normocephalic.   Right Ear: External ear normal.   Left Ear: External ear normal.   Nose: Nose normal.   Eyes: Conjunctivae are normal.   Neck: Normal range of motion.   Cardiovascular: Normal rate, regular rhythm, normal heart sounds and intact distal pulses.   Pulmonary/Chest: Effort normal and breath sounds normal. No respiratory distress. He has no wheezes. He has no rales.   Abdominal: Soft. Bowel sounds are normal. He exhibits no distension and no mass. There is no tenderness. No hernia.   Musculoskeletal: He exhibits no edema.        Thoracic back: He exhibits  decreased range of motion and pain.        Lumbar back: He exhibits decreased range of motion and pain.        Back:    NROM all major joints   Neurological: He is alert.   Skin: Skin is warm and dry. No rash noted.   Psychiatric: He has a normal mood and affect. His behavior is normal.   Nursing note and vitals reviewed.      RECORDS REVIEW:   I have reviewed and interpreted the following lab test results  obtained at the time of the visit today.     ASSESSMENT     Diagnoses and all orders for this visit:    S/P lumbar fusion    Status post lumbar spine surgery for decompression of spinal cord    Partial symptomatic epilepsy with complex partial seizures, not intractable, without status epilepticus (CMS/HCC)    Arnold-Chiari malformation, type I (CMS/HCC)    H/O: CVA (cerebrovascular accident)        PLAN  Patient her for PT and rehabilitation, s/p a lumbar decompression and fusion. He is taking Tramadol 50 mg q 6 hours prn but is requesting it be scheduled, due to having to ask for it every 6 hours. Will continue prn, due to his seizure disorder and consider changing to [place]. No recent reported seizure activity. He does follow Neurology.     Staff to continue supportive care for all ADLs.     [x]  Discussed Patient in detail with nursing/staff, addressed all needs today.     [x]  Plan of Care Reviewed   [x]  PT/OT Reviewed   [x]  Order Changes  []  Discharge Plans Reviewed  [x]  Advance Directive on file with Nursing Home.   [x]  POA on file with Nursing Home.   [x]  Code Status listed: []  Full Code   []  DNR          .     Sandra Leija APRN.

## 2019-10-31 ENCOUNTER — NURSING HOME (OUTPATIENT)
Dept: INTERNAL MEDICINE | Facility: CLINIC | Age: 79
End: 2019-10-31

## 2019-10-31 VITALS
WEIGHT: 186 LBS | SYSTOLIC BLOOD PRESSURE: 124 MMHG | TEMPERATURE: 97.6 F | DIASTOLIC BLOOD PRESSURE: 70 MMHG | RESPIRATION RATE: 18 BRPM | BODY MASS INDEX: 23.25 KG/M2 | HEART RATE: 68 BPM

## 2019-10-31 DIAGNOSIS — E78.2 MIXED HYPERLIPIDEMIA: ICD-10-CM

## 2019-10-31 DIAGNOSIS — I25.5 ISCHEMIC CARDIOMYOPATHY: ICD-10-CM

## 2019-10-31 DIAGNOSIS — I10 ESSENTIAL HYPERTENSION: ICD-10-CM

## 2019-10-31 DIAGNOSIS — Z98.1 S/P LUMBAR SPINAL FUSION: Primary | ICD-10-CM

## 2019-10-31 DIAGNOSIS — I63.9 CEREBROVASCULAR ACCIDENT (CVA), UNSPECIFIED MECHANISM (HCC): ICD-10-CM

## 2019-10-31 DIAGNOSIS — I25.118 CORONARY ARTERY DISEASE OF NATIVE HEART WITH STABLE ANGINA PECTORIS, UNSPECIFIED VESSEL OR LESION TYPE (HCC): ICD-10-CM

## 2019-10-31 PROCEDURE — 99305 1ST NF CARE MODERATE MDM 35: CPT | Performed by: INTERNAL MEDICINE

## 2019-11-04 ENCOUNTER — NURSING HOME (OUTPATIENT)
Dept: FAMILY MEDICINE CLINIC | Facility: CLINIC | Age: 79
End: 2019-11-04

## 2019-11-04 DIAGNOSIS — I10 ESSENTIAL HYPERTENSION: ICD-10-CM

## 2019-11-04 DIAGNOSIS — N40.1 BENIGN PROSTATIC HYPERPLASIA WITH LOWER URINARY TRACT SYMPTOMS, SYMPTOM DETAILS UNSPECIFIED: ICD-10-CM

## 2019-11-04 DIAGNOSIS — R39.198 DIFFICULTY URINATING: ICD-10-CM

## 2019-11-04 DIAGNOSIS — Z98.890 STATUS POST LUMBAR LAMINECTOMY: ICD-10-CM

## 2019-11-04 PROCEDURE — 99308 SBSQ NF CARE LOW MDM 20: CPT | Performed by: NURSE PRACTITIONER

## 2019-11-04 NOTE — PROGRESS NOTES
Nursing Home Progress Note        Weston Hernandez DO ?  MADELEINE Lazo ?  [address] [facility], [place] [address]  Phone: [phone]  Fax: [phone] Luis Eduardo Wyatt MD ?  Anthony Leiva DO [x]   [address] [place] [address]  Phone: [phone]  Fax: [phone]     PATIENT NAME: Pancho Bonner                                                                          YOB: 1940           DATE OF SERVICE: 10/31/2019  FACILITY:  [] [place]   [] [place]   [] [facility]   [x] [facility]   [] Other ______________________________________________________________________     CHIEF COMPLAINT:  Chronic low back pain status post lumbar spinal fusion      HISTORY OF PRESENT ILLNESS:   Patient is a 79-year-old white male with a history of ischemic cardiomyopathy, CVA, hypertension, and hyperlipidemia who was transferred from [facility] in [place] following L4/L5 decompression and fusion by Dr. Hyde on 10/21/2019.  Patient had a fairly uncomplicated hospitalization.  He was monitored and treated with physical therapy and pain control postoperatively and gradually wean down on pain medications.  He also had some postoperative constipation which resolved after treatment.    Upon evaluation today, patient was pleasant and content with his current care at the facility.  He did not voice any significant concerns.  He has been working with physical therapy well.    PAST MEDICAL & SURGICAL HISTORY:   Past Medical History:   Diagnosis Date   • Anxiety    • Arnold-Chiari malformation, type I (CMS/HCC)     followed by Dr. Martinez, but last note available was 2014   • BPH (benign prostatic hyperplasia)    • Chronic Pleural effusion on right    • Complex partial seizure (CMS/HCC)     followed by Dr. Martinez, but last note available was 2014   • Congestive heart failure (CMS/HCC)     follows with Dr. Pompa at [facility], EF 30%   • Coronary artery disease     on ASA/plavix   • CVA (cerebral  vascular accident) (CMS/HCC)     apical thrombus on chronic coumadin   • Diabetes mellitus (CMS/HCC)    • ED (erectile dysfunction)    • GERD (gastroesophageal reflux disease)    • Hyperlipidemia    • Hypertension    • Ischemic cardiomyopathy    • Myocardial infarction (CMS/HCC)    • Non-sustained ventricular tachycardia (CMS/HCC)    • Renal disorder       Past Surgical History:   Procedure Laterality Date   • CARDIAC CATHETERIZATION  02/2016    [facility], 3 vessel disease, patent LIMA to LAD bypass graft    • CARDIAC CATHETERIZATION  05/25/2017    [facility], severe 3 vessel disease, patent LIMA to LAD bypass graft, recommend medical management    • CARDIAC DEFIBRILLATOR PLACEMENT  08/2009    w/ PPM Medtronic (DDD)   • COLONOSCOPY W/ BIOPSIES     • CORONARY ANGIOPLASTY WITH STENT PLACEMENT     • CORONARY ARTERY BYPASS GRAFT  2000    [facility]   • INSERT / REPLACE / REMOVE PACEMAKER  05/26/2017    Medtronic generator change, Dr. Ho, [facility]    • INTERVENTIONAL RADIOLOGY PROCEDURE Bilateral 6/1/2017    Procedure: Carotid Cerebral Angiogram;  Surgeon: Wil Rodrigez MD;  Location: [facility] INVASIVE LOCATION;  Service:          MEDICATIONS:  I have reviewed and reconciled the patients medication list in the patients chart at the skilled nursing facility today.      ALLERGIES:  Allergies   Allergen Reactions   • Latex Rash         SOCIAL HISTORY:  Social History     Socioeconomic History   • Marital status:      Spouse name: Not on file   • Number of children: Not on file   • Years of education: Not on file   • Highest education level: Not on file   Occupational History     Employer: RETIRED   Tobacco Use   • Smoking status: Never Smoker   • Smokeless tobacco: Never Used   Substance and Sexual Activity   • Alcohol use: No   • Drug use: No   • Sexual activity: Defer     Comment:        FAMILY HISTORY:  Family History   Problem Relation Age of Onset   • Diabetes Mother    • Hypertension Mother    • Heart disease Mother     • Heart disease Father    • Stroke Father    • Heart disease Sister    • Diabetes Other    • Heart disease Other    • Hypertension Other    • Stroke Other        REVIEW OF SYSTEMS:  Review of Systems   Constitutional: Negative for chills, fatigue and fever.   HENT: Negative for congestion, ear pain, rhinorrhea, sinus pressure and sore throat.    Eyes: Negative for visual disturbance.   Respiratory: Negative for cough, chest tightness, shortness of breath and wheezing.    Cardiovascular: Negative for chest pain, palpitations and leg swelling.   Gastrointestinal: Negative for abdominal pain, blood in stool, constipation, diarrhea, nausea and vomiting.   Endocrine: Negative for polydipsia and polyuria.   Genitourinary: Negative for dysuria and hematuria.   Musculoskeletal: Negative for arthralgias and back pain.   Skin: Negative for rash.   Neurological: Negative for dizziness, light-headedness, numbness and headaches.   Psychiatric/Behavioral: Negative for dysphoric mood and sleep disturbance. The patient is not nervous/anxious.           PHYSICAL EXAMINATION:     VITAL SIGNS:  /70   Pulse 68   Temp 97.6 °F (36.4 °C)   Resp 18   Wt 84.4 kg (186 lb)   BMI 23.25 kg/m²     Physical Exam   Constitutional: He is oriented to person, place, and time. He appears well-developed and well-nourished.   HENT:   Head: Normocephalic and atraumatic.   Mouth/Throat: Oropharynx is clear and moist. No oropharyngeal exudate.   Eyes: Conjunctivae and EOM are normal. Pupils are equal, round, and reactive to light. No scleral icterus.   Neck: Normal range of motion. Neck supple. No thyromegaly present.   Cardiovascular: Normal rate, regular rhythm and normal heart sounds. Exam reveals no gallop and no friction rub.   No murmur heard.  Pulmonary/Chest: Effort normal and breath sounds normal. No respiratory distress. He has no wheezes.   Abdominal: Soft. Bowel sounds are normal. He exhibits no distension. There is no tenderness.    Musculoskeletal: Normal range of motion.   Lymphadenopathy:     He has no cervical adenopathy.   Neurological: He is alert and oriented to person, place, and time.   Skin: Skin is warm and dry. No rash noted.   Psychiatric: He has a normal mood and affect. His behavior is normal.   Nursing note and vitals reviewed.      RECORDS REVIEW:   Discharge summary, [facility] 10/25/2019    ASSESSMENT     Diagnoses and all orders for this visit:    S/P lumbar spinal fusion    Cerebrovascular accident (CVA), unspecified mechanism (CMS/HCC)    History of Ischemic cardiomyopathy. EF 30% in past, EF 50% on echo 6/1/17    Mixed hyperlipidemia    Essential hypertension    Coronary artery disease of native heart with stable angina pectoris, unspecified vessel or lesion type (CMS/HCC)        PLAN  Patient is a 79-year-old white male admitted to the [facility] for rehab following L4/L5 spinal surgery    L4/L5 discectomy and spinal fusion  -Patient is postoperative and is recovering well.  Pain control is improving gradually.  He is working well with physical therapy.  Pain medications being refilled as needed.    Ischemic cardiomyopathy  -Continue current medications including Eliquis and beta-blockers.    History of strokes   -Continue Eliquis.    Essential hypertension  -Controlled on current medications.    Hyperlipidemia  -Stable on current medications.  [x]  Discussed Patient in detail with nursing/staff, addressed all needs today.     [x]  Plan of Care Reviewed   []  PT/OT Reviewed   []  Order Changes  []  Discharge Plans Reviewed  [x]  Advance Directive on file with Nursing Home.   [x]  POA on file with Nursing Home.    [x]  Code Status listed and reviewed.          Anthony Leiva DO.  11/4/2019

## 2019-11-05 PROBLEM — Z98.890 STATUS POST LUMBAR LAMINECTOMY: Status: ACTIVE | Noted: 2019-11-05

## 2019-11-05 NOTE — PROGRESS NOTES
Nursing Home Follow Up Note      Weston Hernandez DO []   MADELEINE Lazo [x]  [address] [place] [address]  Phone: [phone]  Fax: [phone] Luis Eduardo Wyatt MD []    Anthony Leiva DO []   [address] [place] [address]  Phone: [phone]  Fax: [phone]     PATIENT NAME: Pancho Bonner                                                                          YOB: 1940           DATE OF SERVICE: 11/4/2019  FACILITY:  [][place]   [] [place]   [] [facility]   [x] [facility]   [] Other ______________________________________________________________________      “I confirm accuracy of unchanged data/findings which have been carried forward from previous visit, as well as I have updated appropriately those that have changed.”      CHIEF COMPLAINT:  F/u difficulty urinating      HISTORY OF PRESENT ILLNESS:   Patient with c/o difficulty urinating last night, reports it has been going on some, on and off.  A UA was collected this morning and Flomax was started. He does have a Urologist for BPH    PAST MEDICAL & SURGICAL HISTORY:   Past Medical History:   Diagnosis Date   • Anxiety    • Arnold-Chiari malformation, type I (CMS/HCC)     followed by Dr. Martinez, but last note available was 2014   • BPH (benign prostatic hyperplasia)    • Chronic Pleural effusion on right    • Complex partial seizure (CMS/HCC)     followed by Dr. Martinez, but last note available was 2014   • Congestive heart failure (CMS/HCC)     follows with Dr. Pompa at [facility], EF 30%   • Coronary artery disease     on ASA/plavix   • CVA (cerebral vascular accident) (CMS/HCC)     apical thrombus on chronic coumadin   • Diabetes mellitus (CMS/HCC)    • ED (erectile dysfunction)    • GERD (gastroesophageal reflux disease)    • Hyperlipidemia    • Hypertension    • Ischemic cardiomyopathy    • Myocardial infarction (CMS/HCC)    • Non-sustained ventricular tachycardia (CMS/HCC)    • Renal disorder       Past  Surgical History:   Procedure Laterality Date   • CARDIAC CATHETERIZATION  02/2016    [facility], 3 vessel disease, patent LIMA to LAD bypass graft    • CARDIAC CATHETERIZATION  05/25/2017    [facility], severe 3 vessel disease, patent LIMA to LAD bypass graft, recommend medical management    • CARDIAC DEFIBRILLATOR PLACEMENT  08/2009    w/ PPM Medtronic (DDD)   • COLONOSCOPY W/ BIOPSIES     • CORONARY ANGIOPLASTY WITH STENT PLACEMENT     • CORONARY ARTERY BYPASS GRAFT  2000    [facility]   • INSERT / REPLACE / REMOVE PACEMAKER  05/26/2017    Medtronic generator change, Dr. Ho, [facility]    • INTERVENTIONAL RADIOLOGY PROCEDURE Bilateral 6/1/2017    Procedure: Carotid Cerebral Angiogram;  Surgeon: Wil Rodrigez MD;  Location: [facility] INVASIVE LOCATION;  Service:          MEDICATIONS:  I have reviewed and reconciled the patients medication list in the patients chart at the skilled nursing facility today.      ALLERGIES:    Allergies   Allergen Reactions   • Latex Rash         SOCIAL HISTORY:    Social History     Socioeconomic History   • Marital status:      Spouse name: Not on file   • Number of children: Not on file   • Years of education: Not on file   • Highest education level: Not on file   Occupational History     Employer: RETIRED   Tobacco Use   • Smoking status: Never Smoker   • Smokeless tobacco: Never Used   Substance and Sexual Activity   • Alcohol use: No   • Drug use: No   • Sexual activity: Defer     Comment:        FAMILY HISTORY:    Family History   Problem Relation Age of Onset   • Diabetes Mother    • Hypertension Mother    • Heart disease Mother    • Heart disease Father    • Stroke Father    • Heart disease Sister    • Diabetes Other    • Heart disease Other    • Hypertension Other    • Stroke Other        REVIEW OF SYSTEMS:    Review of Systems   Constitutional: Negative for activity change, appetite change, chills, diaphoresis, fatigue, fever, unexpected weight gain and unexpected weight loss.    HENT: Negative for congestion, ear pain, mouth sores, nosebleeds, postnasal drip, rhinorrhea, sinus pressure, sneezing, sore throat, swollen glands and trouble swallowing.    Eyes: Negative for blurred vision, pain, discharge, redness, itching and visual disturbance.   Respiratory: Negative for apnea, cough, choking, chest tightness, shortness of breath, wheezing and stridor.    Cardiovascular: Negative for chest pain, palpitations and leg swelling.   Gastrointestinal: Negative for abdominal distention, abdominal pain, blood in stool, constipation, diarrhea, nausea, vomiting, GERD and indigestion.   Endocrine: Negative for polydipsia and polyuria.   Genitourinary: Positive for difficulty urinating. Negative for decreased urine volume, dysuria, flank pain, frequency, hematuria, urgency and urinary incontinence.   Musculoskeletal: Positive for arthralgias and back pain. Negative for gait problem, joint swelling and myalgias.   Skin: Negative for color change, dry skin, rash and skin lesions.   Allergic/Immunologic: Negative for environmental allergies.   Neurological: Positive for memory problem. Negative for dizziness, seizures, speech difficulty, weakness and confusion.   Psychiatric/Behavioral: Negative for behavioral problems, dysphoric mood, hallucinations, sleep disturbance and depressed mood. The patient is not nervous/anxious.          PHYSICAL EXAMINATION:   VITAL SIGNS: BP: 137/82    HR: 72    02: 97%      RR: 20      T: 98.6     Wt: 186    Physical Exam   Constitutional: He appears well-developed and well-nourished.   HENT:   Head: Normocephalic.   Right Ear: External ear normal.   Left Ear: External ear normal.   Nose: Nose normal.   Eyes: Conjunctivae are normal.   Neck: Normal range of motion.   Cardiovascular: Normal rate, regular rhythm, normal heart sounds and intact distal pulses.   Pulmonary/Chest: Effort normal and breath sounds normal. No respiratory distress. He has no wheezes. He has no rales.    Abdominal: Soft. Bowel sounds are normal. He exhibits no distension and no mass. There is no tenderness. No hernia.   Musculoskeletal: He exhibits no edema.        Thoracic back: He exhibits decreased range of motion and pain.        Lumbar back: He exhibits decreased range of motion and pain.        Back:    NROM all major joints   Neurological: He is alert.   Skin: Skin is warm and dry. No rash noted.   Psychiatric: He has a normal mood and affect. His behavior is normal.   Nursing note and vitals reviewed.      RECORDS REVIEW:   I have reviewed and interpreted the following lab test results  obtained at the time of the visit today.     ASSESSMENT     Diagnoses and all orders for this visit:    Difficulty urinating    Benign prostatic hyperplasia with lower urinary tract symptoms, symptom details unspecified    Essential hypertension    Status post lumbar laminectomy        PLAN  Patient started on Flomax, on 11/3, for difficulty urinating. He has not had >200 cc when I & O catheterized. Per staff, urine concentrated.  After further discussion, patient has not been drinking a lot of fluids, for fear he can not urinate. He was encouraged to increase po fluid intake. Plans are for patient to go home on 11/6. He does have a Urologist, for BPH, and has an appointment with him next week. Awaiting UA results.     BP at goal, no hypotension with adding Flomax..    He is working and progressing well with PT, s/p his Laminectomy. He saw his surgeon today and is doing well.     Staff to continue supportive care for all ADLs.     [x]  Discussed Patient in detail with nursing/staff, addressed all needs today.     [x]  Plan of Care Reviewed   [x]  PT/OT Reviewed   [x]  Order Changes  []  Discharge Plans Reviewed  [x]  Advance Directive on file with Nursing Home.   [x]  POA on file with Nursing Home.   [x]  Code Status listed: []  Full Code   []  DNR          .     Sandra Leija, MADELEINE.

## 2019-11-06 ENCOUNTER — NURSING HOME (OUTPATIENT)
Dept: FAMILY MEDICINE CLINIC | Facility: CLINIC | Age: 79
End: 2019-11-06

## 2019-11-06 DIAGNOSIS — Z86.73 H/O: CVA (CEREBROVASCULAR ACCIDENT): ICD-10-CM

## 2019-11-06 DIAGNOSIS — E11.59 TYPE 2 DIABETES MELLITUS WITH OTHER CIRCULATORY COMPLICATION, WITHOUT LONG-TERM CURRENT USE OF INSULIN (HCC): ICD-10-CM

## 2019-11-06 DIAGNOSIS — Z98.890 STATUS POST LUMBAR LAMINECTOMY: ICD-10-CM

## 2019-11-06 DIAGNOSIS — I61.9: ICD-10-CM

## 2019-11-06 DIAGNOSIS — N40.1 BENIGN PROSTATIC HYPERPLASIA WITH LOWER URINARY TRACT SYMPTOMS, SYMPTOM DETAILS UNSPECIFIED: ICD-10-CM

## 2019-11-06 DIAGNOSIS — I10 ESSENTIAL HYPERTENSION: ICD-10-CM

## 2019-11-06 DIAGNOSIS — R39.198 DIFFICULTY URINATING: ICD-10-CM

## 2019-11-06 DIAGNOSIS — G40.209 PARTIAL SYMPTOMATIC EPILEPSY WITH COMPLEX PARTIAL SEIZURES, NOT INTRACTABLE, WITHOUT STATUS EPILEPTICUS (HCC): ICD-10-CM

## 2019-11-06 DIAGNOSIS — I25.5 ISCHEMIC CARDIOMYOPATHY: ICD-10-CM

## 2019-11-06 PROCEDURE — 99315 NF DSCHRG MGMT 30 MIN/LESS: CPT | Performed by: NURSE PRACTITIONER

## 2019-11-07 PROBLEM — G40.209 COMPLEX PARTIAL SEIZURE (HCC): Status: RESOLVED | Noted: 2017-06-09 | Resolved: 2019-11-07

## 2019-11-07 NOTE — PROGRESS NOTES
Nursing Home Follow Up Note      Weston Hernandez DO []   MADELEINE Lazo [x]  [address] [place] [address]  Phone: [phone]  Fax: [phone] Luis Eduardo Wyatt MD []    Anthony Leiva DO []   [address] [place] [address]  Phone: [phone]  Fax: [phone]     PATIENT NAME: Pancho Bonner                                                                          YOB: 1940           DATE OF SERVICE: 11/6/2019  FACILITY:  [][facility]   [] [place]   [] [facility]   [x] [facility]   [] Other ______________________________________________________________________      “I confirm accuracy of unchanged data/findings which have been carried forward from previous visit, as well as I have updated appropriately those that have changed.”      CHIEF COMPLAINT:  Discharge home      HISTORY OF PRESENT ILLNESS:   Patient is a 79-year-old white male with a history of ischemic cardiomyopathy, CVA, hypertension, and hyperlipidemia who was admitted from [facility] in [place], following L4/L5 decompression and fusion by Dr. Hyde on 10/21/2019.  He has progressed very well with PT and has progressed as expected, per his Neurosurgeon yesterday. He developed some problems urinating, during his stay. He was started on   Flomax, as he does have a history of BPH. A UA was checked and was normal. It was determined that Mr Bonner needed to increase his fluid intake and this did help with is urination. He is voiding normally at this time. He is being discharged today, and will continue in home PT/OT. He has a follow up appointment with his Urologist, next week. He will continue with all of his current medications, as directed. He does not feel he needs any pain medication to go home with.          PAST MEDICAL & SURGICAL HISTORY:   Past Medical History:   Diagnosis Date   • Anxiety    • Arnold-Chiari malformation, type I (CMS/HCC)     followed by Dr. Martinez, but last note  available was 2014   • BPH (benign prostatic hyperplasia)    • Chronic Pleural effusion on right    • Complex partial seizure (CMS/HCC)     followed by Dr. Martinez, but last note available was 2014   • Congestive heart failure (CMS/[facility])     follows with Dr. Pompa at [facility], EF 30%   • Coronary artery disease     on ASA/plavix   • CVA (cerebral vascular accident) (CMS/[facility])     apical thrombus on chronic coumadin   • Diabetes mellitus (CMS/[facility])    • ED (erectile dysfunction)    • GERD (gastroesophageal reflux disease)    • Hyperlipidemia    • Hypertension    • Ischemic cardiomyopathy    • Myocardial infarction (CMS/[facility])    • Non-sustained ventricular tachycardia (CMS/HCC)    • Renal disorder       Past Surgical History:   Procedure Laterality Date   • CARDIAC CATHETERIZATION  02/2016    [facility], 3 vessel disease, patent LIMA to LAD bypass graft    • CARDIAC CATHETERIZATION  05/25/2017    [facility], severe 3 vessel disease, patent LIMA to LAD bypass graft, recommend medical management    • CARDIAC DEFIBRILLATOR PLACEMENT  08/2009    w/ PPM Medtronic (DDD)   • COLONOSCOPY W/ BIOPSIES     • CORONARY ANGIOPLASTY WITH STENT PLACEMENT     • CORONARY ARTERY BYPASS GRAFT  2000    [facility]   • INSERT / REPLACE / REMOVE PACEMAKER  05/26/2017    Medtronic generator change, Dr. Ho, [facility]    • INTERVENTIONAL RADIOLOGY PROCEDURE Bilateral 6/1/2017    Procedure: Carotid Cerebral Angiogram;  Surgeon: Wil Rodrigez MD;  Location: [facility] INVASIVE LOCATION;  Service:          MEDICATIONS:  I have reviewed and reconciled the patients medication list in the patients chart at the skilled nursing facility today.      ALLERGIES:    Allergies   Allergen Reactions   • Latex Rash         SOCIAL HISTORY:    Social History     Socioeconomic History   • Marital status:      Spouse name: Not on file   • Number of children: Not on file   • Years of education: Not on file   • Highest education level: Not on file   Occupational History     Employer:  RETIRED   Tobacco Use   • Smoking status: Never Smoker   • Smokeless tobacco: Never Used   Substance and Sexual Activity   • Alcohol use: No   • Drug use: No   • Sexual activity: Defer     Comment:        FAMILY HISTORY:    Family History   Problem Relation Age of Onset   • Diabetes Mother    • Hypertension Mother    • Heart disease Mother    • Heart disease Father    • Stroke Father    • Heart disease Sister    • Diabetes Other    • Heart disease Other    • Hypertension Other    • Stroke Other        REVIEW OF SYSTEMS:    Review of Systems   Constitutional: Negative for activity change, appetite change, chills, diaphoresis, fatigue, fever, unexpected weight gain and unexpected weight loss.   HENT: Negative for congestion, ear pain, mouth sores, nosebleeds, postnasal drip, rhinorrhea, sinus pressure, sneezing, sore throat, swollen glands and trouble swallowing.    Eyes: Negative for blurred vision, pain, discharge, redness, itching and visual disturbance.   Respiratory: Negative for apnea, cough, choking, chest tightness, shortness of breath, wheezing and stridor.    Cardiovascular: Negative for chest pain, palpitations and leg swelling.   Gastrointestinal: Negative for abdominal distention, abdominal pain, blood in stool, constipation, diarrhea, nausea, vomiting, GERD and indigestion.   Endocrine: Negative for polydipsia and polyuria.   Genitourinary: Positive for difficulty urinating (improved ). Negative for decreased urine volume, dysuria, flank pain, frequency, hematuria, urgency and urinary incontinence.   Musculoskeletal: Positive for arthralgias and back pain. Negative for gait problem, joint swelling and myalgias.   Skin: Negative for color change, dry skin, rash and skin lesions.   Allergic/Immunologic: Negative for environmental allergies.   Neurological: Positive for memory problem. Negative for dizziness, seizures, speech difficulty, weakness and confusion.   Psychiatric/Behavioral: Negative for  behavioral problems, dysphoric mood, hallucinations, sleep disturbance and depressed mood. The patient is not nervous/anxious.          PHYSICAL EXAMINATION:   VITAL SIGNS: BP: 137/82    HR: 72    02: 97%      RR: 20      T: 98.6     Wt: 186    Physical Exam   Constitutional: He appears well-developed and well-nourished.   HENT:   Head: Normocephalic.   Right Ear: External ear normal.   Left Ear: External ear normal.   Nose: Nose normal.   Eyes: Conjunctivae are normal.   Neck: Normal range of motion.   Cardiovascular: Normal rate, regular rhythm, normal heart sounds and intact distal pulses.   Pulmonary/Chest: Effort normal and breath sounds normal. No respiratory distress. He has no wheezes. He has no rales.   Abdominal: Soft. Bowel sounds are normal. He exhibits no distension and no mass. There is no tenderness. No hernia.   Musculoskeletal: He exhibits no edema.        Thoracic back: He exhibits decreased range of motion and pain.        Lumbar back: He exhibits decreased range of motion and pain.        Back:    NROM all major joints   Neurological: He is alert.   Skin: Skin is warm and dry. No rash noted.   Psychiatric: He has a normal mood and affect. His behavior is normal.   Nursing note and vitals reviewed.      RECORDS REVIEW:   I have reviewed and interpreted the following lab test results  obtained at the time of the visit today.     ASSESSMENT     Diagnoses and all orders for this visit:    Status post lumbar laminectomy    Difficulty urinating    Benign prostatic hyperplasia with lower urinary tract symptoms, symptom details unspecified    Essential hypertension    Type 2 diabetes mellitus with other circulatory complication, without long-term current use of insulin (CMS/[facility])    Ischemic cardiomyopathy    H/O: CVA (cerebrovascular accident)    Partial symptomatic epilepsy with complex partial seizures, not intractable, without status epilepticus (CMS/HCC)    Apoplectic attack  (CMS/[facility])        PLAN  Patient being discharged home today, with PT/OT. He will continue to follow up with Neurosurgery as directed, for his recent lumbar surgery.     He will follow up with his Urologist, next week, for his urination difficulties and his history of BPH.     BP at goal on current medications.    DM stable on current medications, without reports of hypo/hyperglycemia.     He will follow up with Cardiology as directed, for his history of CVD.    He will follow up with his his Neurologist as directed for his seizure disorder.     He will continue all medications as directed, as he has here, but does not want to continue pain medication at home.     [x]  Discussed Patient in detail with nursing/staff, addressed all needs today.     [x]  Plan of Care Reviewed   [x]  PT/OT Reviewed   [x]  Order Changes  []  Discharge Plans Reviewed  [x]  Advance Directive on file with Nursing Home.   [x]  POA on file with Nursing Home.   [x]  Code Status listed: []  Full Code   []  DNR          .     MADELEINE Joel.

## 2019-11-08 ENCOUNTER — OFFICE VISIT (OUTPATIENT)
Dept: UROLOGY | Facility: CLINIC | Age: 79
End: 2019-11-08

## 2019-11-08 VITALS
BODY MASS INDEX: 24.37 KG/M2 | OXYGEN SATURATION: 98 % | HEART RATE: 86 BPM | WEIGHT: 196 LBS | HEIGHT: 75 IN | TEMPERATURE: 98.6 F

## 2019-11-08 DIAGNOSIS — N40.0 BENIGN NON-NODULAR PROSTATIC HYPERPLASIA WITHOUT LOWER URINARY TRACT SYMPTOMS: Primary | ICD-10-CM

## 2019-11-08 PROCEDURE — 76857 US EXAM PELVIC LIMITED: CPT | Performed by: UROLOGY

## 2019-11-08 PROCEDURE — 99214 OFFICE O/P EST MOD 30 MIN: CPT | Performed by: UROLOGY

## 2019-11-08 RX ORDER — POLYETHYLENE GLYCOL 3350 17 G/17G
17 POWDER, FOR SOLUTION ORAL DAILY PRN
COMMUNITY

## 2019-11-08 RX ORDER — CLOPIDOGREL BISULFATE 75 MG/1
75 TABLET ORAL DAILY
COMMUNITY

## 2019-11-08 RX ORDER — TAMSULOSIN HYDROCHLORIDE 0.4 MG/1
1 CAPSULE ORAL DAILY
COMMUNITY
End: 2019-11-08 | Stop reason: SDUPTHER

## 2019-11-08 RX ORDER — ASPIRIN 81 MG
100 TABLET, DELAYED RELEASE (ENTERIC COATED) ORAL 2 TIMES DAILY
Status: ON HOLD | COMMUNITY
End: 2020-07-04

## 2019-11-08 RX ORDER — TAMSULOSIN HYDROCHLORIDE 0.4 MG/1
1 CAPSULE ORAL DAILY
Qty: 30 CAPSULE | Refills: 11 | Status: ON HOLD | OUTPATIENT
Start: 2019-11-08 | End: 2020-07-07

## 2019-11-08 RX ORDER — HYDROCODONE BITARTRATE AND ACETAMINOPHEN 5; 325 MG/1; MG/1
1 TABLET ORAL EVERY 6 HOURS PRN
Status: ON HOLD | COMMUNITY
End: 2020-07-04

## 2019-11-08 NOTE — PROGRESS NOTES
Chief Complaint  Trouble with Urination (Patient had back surgery and has been having trouble with urination.)        NEVA Bonner is a 79 y.o. male who returns today for the first time in 2 years complaining of difficulty voiding.  The last time I saw him he denied it but he returns today stating he has been on Flomax for some time.  Apparently his chronic difficulty voiding was much worse after his recent back surgery which required hospitalization and now rehab.  He states he had an In-N-Out cath done twice to drain his bladder.  Currently he still feels like he is having trouble voiding but had nocturia x2 last night.  He is unable to void in the office today.    Vitals:    11/08/19 0836   Pulse: 86   Temp: 98.6 °F (37 °C)   SpO2: 98%       Past Medical History  Past Medical History:   Diagnosis Date   • Anxiety    • Arnold-Chiari malformation, type I (CMS/HCC)     followed by Dr. Martinez, but last note available was 2014   • BPH (benign prostatic hyperplasia)    • Chronic Pleural effusion on right    • Complex partial seizure (CMS/HCC)     followed by Dr. Martinez, but last note available was 2014   • Congestive heart failure (CMS/HCC)     follows with Dr. Pompa at [facility], EF 30%   • Coronary artery disease     on ASA/plavix   • CVA (cerebral vascular accident) (CMS/[facility])     apical thrombus on chronic coumadin   • Diabetes mellitus (CMS/HCC)    • ED (erectile dysfunction)    • GERD (gastroesophageal reflux disease)    • Hyperlipidemia    • Hypertension    • Ischemic cardiomyopathy    • Myocardial infarction (CMS/HCC)    • Non-sustained ventricular tachycardia (CMS/HCC)    • Renal disorder        Past Surgical History  Past Surgical History:   Procedure Laterality Date   • CARDIAC CATHETERIZATION  02/2016    [facility], 3 vessel disease, patent LIMA to LAD bypass graft    • CARDIAC CATHETERIZATION  05/25/2017    [facility], severe 3 vessel disease, patent LIMA to LAD bypass graft, recommend medical management    • CARDIAC  DEFIBRILLATOR PLACEMENT  08/2009    w/ PPM Medtronic (DDD)   • COLONOSCOPY W/ BIOPSIES     • CORONARY ANGIOPLASTY WITH STENT PLACEMENT     • CORONARY ARTERY BYPASS GRAFT  2000    [facility]   • INSERT / REPLACE / REMOVE PACEMAKER  05/26/2017    Medtronic generator change, Dr. Ho [facility]    • INTERVENTIONAL RADIOLOGY PROCEDURE Bilateral 6/1/2017    Procedure: Carotid Cerebral Angiogram;  Surgeon: Wil Rodrigez MD;  Location: [facility] INVASIVE LOC[facility];  Service:        Medications  has a current medication list which includes the following prescription(s): amiodarone, amlodipine, apixaban, atorvastatin, carvedilol, clopidogrel, docusate sodium, hydrocodone-acetaminophen, lamotrigine, polyethylene glycol, potassium chloride, tamsulosin, alprazolam, aspirin, ferrous gluconate, furosemide, lisinopril, meclizine, metformin, nitroglycerin, pantoprazole, paroxetine, tramadol, and warfarin.      Allergies  Allergies   Allergen Reactions   • Latex Rash       Social History  Social History     Socioeconomic History   • Marital status:      Spouse name: Not on file   • Number of children: Not on file   • Years of education: Not on file   • Highest education level: Not on file   Occupational History     Employer: RETIRED   Tobacco Use   • Smoking status: Never Smoker   • Smokeless tobacco: Never Used   Substance and Sexual Activity   • Alcohol use: No   • Drug use: No   • Sexual activity: Defer     Comment:        Family History  He has no family history of bladder or kidney cancer  He has no family history of kidney stones      AUA Symptom Score:      Review of Systems  Review of Systems   Constitutional: Negative for activity change, appetite change, chills, fatigue, fever, unexpected weight gain and unexpected weight loss.   Respiratory: Negative for apnea, cough, chest tightness, shortness of breath, wheezing and stridor.    Cardiovascular: Negative for chest pain, palpitations and leg swelling.    Gastrointestinal: Negative for abdominal distention, abdominal pain, anal bleeding, blood in stool, constipation, diarrhea, nausea, rectal pain, vomiting, GERD and indigestion.   Genitourinary: Positive for difficulty urinating. Negative for decreased libido, decreased urine volume, discharge, dysuria, flank pain, frequency, genital sores, hematuria, nocturia, penile pain, erectile dysfunction, penile swelling, scrotal swelling, testicular pain, urgency and urinary incontinence.   Musculoskeletal: Positive for back pain. Negative for joint swelling.   Neurological: Negative for tremors, seizures, speech difficulty, weakness and numbness.   Psychiatric/Behavioral: Negative for agitation, decreased concentration, sleep disturbance, depressed mood and stress. The patient is not nervous/anxious.        Physical Exam  Physical Exam   Constitutional: He is oriented to person, place, and time. He appears well-developed and well-nourished.   HENT:   Head: Normocephalic and atraumatic.   Neck: Normal range of motion.   Pulmonary/Chest: Effort normal. No respiratory distress.   Abdominal: Soft. He exhibits no distension and no mass. There is no tenderness. No hernia.   Genitourinary: Rectum normal and prostate normal.   Musculoskeletal: Normal range of motion.   Lymphadenopathy:     He has no cervical adenopathy.   Neurological: He is alert and oriented to person, place, and time.   Skin: Skin is warm and dry.   Psychiatric: He has a normal mood and affect. His behavior is normal.   Vitals reviewed.      Labs Recent and today in the office:  Results for orders placed or performed during the hospital encounter of 02/24/19   Comprehensive Metabolic Panel   Result Value Ref Range    Glucose 116 (H) 74 - 98 mg/dL    BUN 18 7 - 20 mg/dL    Creatinine 1.40 (H) 0.60 - 1.30 mg/dL    Sodium 140 137 - 145 mmol/L    Potassium 4.1 3.5 - 5.1 mmol/L    Chloride 104 98 - 107 mmol/L    CO2 29.0 26.0 - 30.0 mmol/L    Calcium 9.6 8.4 - 10.2  mg/dL    Total Protein 7.5 6.3 - 8.2 g/dL    Albumin 4.40 3.50 - 5.00 g/dL    ALT (SGPT) 27 13 - 69 U/L    AST (SGOT) 23 15 - 46 U/L    Alkaline Phosphatase 64 38 - 126 U/L    Total Bilirubin 0.7 0.2 - 1.3 mg/dL    eGFR Non African Amer 49 (L) >60 mL/min/1.73    Globulin 3.1 gm/dL    A/G Ratio 1.4 1.0 - 2.0 g/dL    BUN/Creatinine Ratio 12.9 6.3 - 21.9    Anion Gap 11.1 10.0 - 20.0 mmol/L   Lipase   Result Value Ref Range    Lipase 138 23 - 300 U/L   Troponin   Result Value Ref Range    Troponin I 0.017 0.000 - 0.034 ng/mL   BNP   Result Value Ref Range    proBNP 1,270.0 (C) 0.0 - 450.0 pg/mL   Protime-INR   Result Value Ref Range    Protime 27.1 (H) 9.3 - 12.1 Seconds    INR 2.47 (H) 0.90 - 1.10   CBC Auto Differential   Result Value Ref Range    WBC 6.37 4.80 - 10.80 10*3/mm3    RBC 4.87 4.70 - 6.10 10*6/mm3    Hemoglobin 12.7 (L) 14.0 - 18.0 g/dL    Hematocrit 41.1 (L) 42.0 - 52.0 %    MCV 84.4 80.0 - 94.0 fL    MCH 26.1 (L) 27.0 - 31.0 pg    MCHC 30.9 30.0 - 37.0 g/dL    RDW 14.7 (H) 11.5 - 14.5 %    RDW-SD 45.0 37.0 - 54.0 fl    MPV 10.5 6.0 - 12.0 fL    Platelets 233 130 - 400 10*3/mm3    Neutrophil % 69.0 37.0 - 80.0 %    Lymphocyte % 19.3 10.0 - 50.0 %    Monocyte % 8.9 0.0 - 12.0 %    Eosinophil % 2.2 0.0 - 7.0 %    Basophil % 0.3 0.0 - 2.5 %    Immature Grans % 0.3 0.0 - 0.6 %    Neutrophils, Absolute 4.39 2.00 - 6.90 10*3/mm3    Lymphocytes, Absolute 1.23 0.60 - 3.40 10*3/mm3    Monocytes, Absolute 0.57 0.00 - 0.90 10*3/mm3    Eosinophils, Absolute 0.14 0.00 - 0.70 10*3/mm3    Basophils, Absolute 0.02 0.00 - 0.20 10*3/mm3    Immature Grans, Absolute 0.02 0.00 - 0.06 10*3/mm3    nRBC 0.0 0.0 - 0.0 /100 WBC         Assessment & Plan  BPH with outlet obstruction/urinary retention: He was still taking opioids for his back pain is recently yesterday which could be a contributing factor.  He is unable to void in the office today but I insisted that he return to sample as soon as practical to rule out infection  since he is had antibiotic cath several times.  The pelvic ultrasound actually shows only 1 ounce of urine in the bladder and a small prostate at less than 30 g.  His gland is completely benign on rectal exam with no signs of cancer.  I would recommend he continue the Flomax and antibiotics will be prescribed if there is infection.  Otherwise he can return in 6 months.

## 2019-11-08 NOTE — PROGRESS NOTES
[facility]- UROLOGY  [address], [address]  [place], [place] [address]  [phone]      11/08/2019    Pancho Bonner  1940        Pelvic Ultrasound with PVR    A transabdominal pelvic ultrasound was performed using a 3.5 MHz transducer of a B-K Rodriguez through the suprapubic area.     The purpose of the study was to investigate voiding difficulty.  There was mild bladder wall thickening noted.  There were no intravesical filling defects seen.  The post void residual of 39.1 ml was noted.  Prostate was homogeneous and was noted to be 28.7 grams.  There was no protrusion of the prostate into the bladder.  Ultrasound images will be scanned into the chart for reference.       CPT code 06566        Performed by Willis Martines MD

## 2019-11-11 ENCOUNTER — TELEPHONE (OUTPATIENT)
Dept: INTERNAL MEDICINE | Facility: CLINIC | Age: 79
End: 2019-11-11

## 2019-11-11 NOTE — TELEPHONE ENCOUNTER
Bhupinder called from [facility] , verbal ok to continue PT, 2x a week for 4 weeks...    Please advise and call at your earliest convenience

## 2019-11-12 ENCOUNTER — OUTSIDE FACILITY SERVICE (OUTPATIENT)
Dept: INTERNAL MEDICINE | Facility: CLINIC | Age: 79
End: 2019-11-12

## 2019-11-19 PROCEDURE — G0180 MD CERTIFICATION HHA PATIENT: HCPCS | Performed by: INTERNAL MEDICINE

## 2019-11-27 ENCOUNTER — TRANSCRIBE ORDERS (OUTPATIENT)
Dept: ADMINISTRATIVE | Facility: HOSPITAL | Age: 79
End: 2019-11-27

## 2019-11-27 DIAGNOSIS — R79.89 ABNORMAL LFTS: Primary | ICD-10-CM

## 2019-12-09 ENCOUNTER — HOSPITAL ENCOUNTER (OUTPATIENT)
Dept: ULTRASOUND IMAGING | Facility: HOSPITAL | Age: 79
Discharge: HOME | End: 2019-12-09
Admitting: INTERNAL MEDICINE

## 2019-12-09 DIAGNOSIS — R79.89 ABNORMAL LFTS: ICD-10-CM

## 2019-12-09 PROCEDURE — 76705 ECHO EXAM OF ABDOMEN: CPT

## 2020-02-22 NOTE — PROGRESS NOTES
Patient: Pancho Bonner    YOB: 1940    Date: 02/24/2020    Primary Care Provider: Mady Coy MD    Chief Complaint   Patient presents with   • Rectal Bleeding     after bowel movements.       SUBJECTIVE:    History of present illness: Patient has a history of benign colon polyps, his last colonoscopy was performed 8-9 years ago.  I saw the patient in the office today as a consultation for evaluation and treatment of recent episode of bright red colored blood per rectum occurring after bowlel movements. Patient denies change in bowel habits including constipation or diarrhea and he denies rectal pain.  Patient had multiple polyps removed in the past.  No weight loss or anemia.  No significant heartburn or reflux symptoms.    The following portions of the patient's history were reviewed and updated as appropriate: allergies, current medications, past family history, past medical history, past social history, past surgical history and problem list.    Review of Systems   Constitutional: Negative for chills, fever and unexpected weight change.   HENT: Negative for trouble swallowing and voice change.    Eyes: Negative for visual disturbance.   Respiratory: Negative for apnea, cough, chest tightness, shortness of breath and wheezing.    Cardiovascular: Negative for chest pain, palpitations and leg swelling.   Gastrointestinal: Positive for anal bleeding. Negative for abdominal distention, abdominal pain, blood in stool, constipation, diarrhea, nausea, rectal pain and vomiting.   Endocrine: Negative for cold intolerance and heat intolerance.   Genitourinary: Negative for difficulty urinating, dysuria, flank pain, scrotal swelling and testicular pain.   Musculoskeletal: Negative for back pain, gait problem and joint swelling.   Skin: Negative for color change, rash and wound.   Neurological: Negative for dizziness, syncope, speech difficulty, weakness, numbness and headaches.   Hematological:  Negative for adenopathy. Does not bruise/bleed easily.   Psychiatric/Behavioral: Negative for confusion. The patient is not nervous/anxious.        History:  Past Medical History:   Diagnosis Date   • Anxiety    • Arnold-Chiari malformation, type I (CMS/[facility])     followed by Dr. Martinez, but last note available was 2014   • BPH (benign prostatic hyperplasia)    • Chronic Pleural effusion on right    • Complex partial seizure (CMS/[facility])     followed by Dr. Martinez, but last note available was 2014   • Congestive heart failure (CMS/[facility])     follows with Dr. Pompa at [facility], EF 30%   • Coronary artery disease     on ASA/plavix   • CVA (cerebral vascular accident) (CMS/[facility])     apical thrombus on chronic coumadin   • Diabetes mellitus (CMS/[facility])    • ED (erectile dysfunction)    • GERD (gastroesophageal reflux disease)    • Hyperlipidemia    • Hypertension    • Ischemic cardiomyopathy    • Myocardial infarction (CMS/[facility])    • Non-sustained ventricular tachycardia (CMS/[facility])    • Renal disorder        Past Surgical History:   Procedure Laterality Date   • CARDIAC CATHETERIZATION  02/2016    [facility], 3 vessel disease, patent LIMA to LAD bypass graft    • CARDIAC CATHETERIZATION  05/25/2017    [facility], severe 3 vessel disease, patent LIMA to LAD bypass graft, recommend medical management    • CARDIAC DEFIBRILLATOR PLACEMENT  08/2009    w/ PPM Medtronic (DDD)   • COLONOSCOPY W/ BIOPSIES     • CORONARY ANGIOPLASTY WITH STENT PLACEMENT     • CORONARY ARTERY BYPASS GRAFT  2000    [facility]   • INSERT / REPLACE / REMOVE PACEMAKER  05/26/2017    Medtronic generator change, Dr. Ho, [facility]    • INTERVENTIONAL RADIOLOGY PROCEDURE Bilateral 6/1/2017    Procedure: Carotid Cerebral Angiogram;  Surgeon: Wil Rodrigez MD;  Location: [facility] INVASIVE LOCATION;  Service:        Family History   Problem Relation Age of Onset   • Diabetes Mother    • Hypertension Mother    • Heart disease Mother    • Heart disease Father    • Stroke Father    • Heart  disease Sister    • Diabetes Other    • Heart disease Other    • Hypertension Other    • Stroke Other        Social History     Tobacco Use   • Smoking status: Never Smoker   • Smokeless tobacco: Never Used   Substance Use Topics   • Alcohol use: No   • Drug use: No       Allergies:  Allergies   Allergen Reactions   • Latex Rash       Medications:    Current Outpatient Medications:   •  amiodarone (PACERONE) 200 MG tablet, Take 200 mg by mouth Daily., Disp: , Rfl:   •  amLODIPine (NORVASC) 10 MG tablet, Take 0.5 tablets by mouth Daily., Disp: , Rfl:   •  apixaban (ELIQUIS) 2.5 MG tablet tablet, Take 2.5 mg by mouth 2 (Two) Times a Day., Disp: , Rfl:   •  atorvastatin (LIPITOR) 40 MG tablet, Take 40 mg by mouth Every Night., Disp: , Rfl:   •  carvedilol (COREG) 6.25 MG tablet, Take 25 mg by mouth 2 (Two) Times a Day., Disp: , Rfl:   •  clopidogrel (PLAVIX) 75 MG tablet, Take 75 mg by mouth 2 (Two) Times a Day., Disp: , Rfl:   •  Docusate Sodium 100 MG capsule, Take 100 mg by mouth 2 (Two) Times a Day., Disp: , Rfl:   •  Ferrous Gluconate 325 (36 FE) MG tablet, TAKE 1 TABLET EVERY DAY, Disp: , Rfl: 1  •  furosemide (LASIX) 40 MG tablet, Take 40 mg by mouth Daily., Disp: , Rfl:   •  lactulose (CHRONULAC) 10 GM/15ML solution, Take 20 g by mouth 2 (Two) Times a Day As Needed., Disp: , Rfl:   •  lamoTRIgine (LaMICtal) 150 MG tablet, Take 150 mg by mouth 2 (Two) Times a Day., Disp: , Rfl:   •  nitroglycerin (NITROSTAT) 0.4 MG SL tablet, Place 0.4 mg under the tongue Every 5 (Five) Minutes As Needed for Chest Pain. Take no more than 3 doses in 15 minutes., Disp: , Rfl:   •  polyethylene glycol (MIRALAX) packet, Take 17 g by mouth Daily., Disp: , Rfl:   •  potassium chloride (KLOR-CON) 20 MEQ CR tablet, Take 20 mEq by mouth Daily., Disp: , Rfl:   •  spironolactone (ALDACTONE) 25 MG tablet, Take 25 mg by mouth Daily., Disp: , Rfl:   •  torsemide (DEMADEX) 100 MG tablet, Take 100 mg by mouth Daily., Disp: , Rfl:   •   "ALPRAZolam (XANAX) 0.5 MG tablet, TAKE 1/2-1 TABLET BY MOUTH DAILY AS NEEDED, Disp: , Rfl: 0  •  aspirin 325 MG tablet, Take 1 tablet by mouth Daily. (Patient taking differently: Take 81 mg by mouth Daily.), Disp: 30 tablet, Rfl: 3  •  bisacodyl (DULCOLAX) 5 MG EC tablet, Clear liquid diet day prior to colonoscopy. 2 at 3pm and 2 at 7pm., Disp: 4 tablet, Rfl: 0  •  HYDROcodone-acetaminophen (NORCO) 5-325 MG per tablet, Take 1 tablet by mouth Every 6 (Six) Hours As Needed., Disp: , Rfl:   •  lisinopril (PRINIVIL,ZESTRIL) 20 MG tablet, Take 30 mg by mouth 2 (Two) Times a Day., Disp: , Rfl:   •  meclizine (ANTIVERT) 12.5 MG tablet, Take 1 tablet by mouth 3 (Three) Times a Day As Needed for dizziness or Nausea., Disp: 15 tablet, Rfl: 0  •  metFORMIN (GLUCOPHAGE) 500 MG tablet, Take 500 mg by mouth 2 (Two) Times a Day., Disp: , Rfl:   •  pantoprazole (PROTONIX) 40 MG EC tablet, Take 40 mg by mouth Daily., Disp: , Rfl:   •  PARoxetine (PAXIL) 10 MG tablet, Take 5 mg by mouth Every Morning., Disp: , Rfl:   •  polyethylene glycol (MIRALAX) powder, Mix 238g powder with 64oz of clear liquids. Starting at 5pm drink 8oz every 10-15 minutes until consumed, Disp: 238 g, Rfl: 0  •  tamsulosin (FLOMAX) 0.4 MG capsule 24 hr capsule, Take 1 capsule by mouth Daily., Disp: 30 capsule, Rfl: 11  •  traMADol (ULTRAM) 50 MG tablet, Take 1 tablet by mouth Every 6 (Six) Hours As Needed for Moderate Pain ., Disp: 120 tablet, Rfl: 0  •  warfarin (COUMADIN) 5 MG tablet, Take 5 mg starting tomorrow 6-12-17, Disp: , Rfl:     OBJECTIVE:    Vital Signs:   Vitals:    02/24/20 1355   BP: 108/66   Pulse: 92   Resp: 18   Temp: 98.2 °F (36.8 °C)   TempSrc: Temporal   SpO2: 98%   Weight: 77.1 kg (170 lb)   Height: 190.5 cm (75\")       Physical Exam:   General Appearance:    Alert, cooperative, in no acute distress   Head:    Normocephalic, without obvious abnormality, atraumatic   Eyes:            Lids and lashes normal, conjunctivae and sclerae normal, " no   icterus, no pallor, corneas clear, PERRL   Ears:    Ears appear intact with no abnormalities noted   Throat:   No oral lesions, no thrush, oral mucosa moist   Neck:   No adenopathy, supple, trachea midline, no thyromegaly,  no JVD   Lungs:     Clear to auscultation,respirations regular, even and                  unlabored    Heart:    Regular rhythm and normal rate, normal S1 and S2, no            murmur   Abdomen:     no masses, no organomegaly, soft with minimal left lower quadrant tenderness.  Rectal- no visible hemorrhoids or anal fissure.   Extremities:   Moves all extremities well, no edema, no cyanosis, no             redness   Pulses:   Pulses palpable and equal bilaterally   Skin:   No bleeding, bruising or rash   Lymph nodes:   No palpable adenopathy   Neurologic:   Cranial nerves 2 - 12 grossly intact, sensation intact      Results Review:   I reviewed the patient's new clinical results.    Review of Systems was reviewed and confirmed as accurate as documented by the MA.    ASSESSMENT/PLAN:    1. Rectal bleed    2. History of colon polyps        I recommend a colonoscopy for further evaluation. The procedure was explained as well as the risks which include but are not limited to bleeding, infection, perforation, abdominal pain etc. The patient understands these risks and the procedure and wishes to proceed.  Patient placed on Tucks medicated pads and Metamucil with high-fiber diet for constipation and hemorrhoid disease.    Electronically signed by Shorty Guido MD  02/24/20 1:28 PM      Portions of this note have been scribed for Shorty Guido MD by Taniya Odell. 2/24/2020  2:49 PM

## 2020-02-24 ENCOUNTER — OFFICE VISIT (OUTPATIENT)
Dept: SURGERY | Facility: CLINIC | Age: 80
End: 2020-02-24

## 2020-02-24 VITALS
TEMPERATURE: 98.2 F | OXYGEN SATURATION: 98 % | RESPIRATION RATE: 18 BRPM | HEART RATE: 92 BPM | DIASTOLIC BLOOD PRESSURE: 66 MMHG | WEIGHT: 170 LBS | SYSTOLIC BLOOD PRESSURE: 108 MMHG | BODY MASS INDEX: 21.14 KG/M2 | HEIGHT: 75 IN

## 2020-02-24 DIAGNOSIS — K62.5 RECTAL BLEED: Primary | ICD-10-CM

## 2020-02-24 DIAGNOSIS — Z86.010 HISTORY OF COLON POLYPS: ICD-10-CM

## 2020-02-24 PROCEDURE — 99203 OFFICE O/P NEW LOW 30 MIN: CPT | Performed by: SURGERY

## 2020-02-24 RX ORDER — POLYETHYLENE GLYCOL 3350 17 G/17G
POWDER, FOR SOLUTION ORAL
Qty: 238 G | Refills: 0 | Status: ON HOLD | OUTPATIENT
Start: 2020-02-24 | End: 2020-07-04

## 2020-02-24 RX ORDER — TORSEMIDE 100 MG/1
100 TABLET ORAL DAILY
Status: ON HOLD | COMMUNITY
End: 2020-07-04

## 2020-02-24 RX ORDER — LACTULOSE 10 G/15ML
20 SOLUTION ORAL 2 TIMES DAILY PRN
Status: ON HOLD | COMMUNITY
End: 2020-07-04

## 2020-02-24 RX ORDER — BISACODYL 5 MG/1
TABLET, DELAYED RELEASE ORAL
Qty: 4 TABLET | Refills: 0 | Status: SHIPPED | OUTPATIENT
Start: 2020-02-24

## 2020-02-24 RX ORDER — SPIRONOLACTONE 25 MG/1
25 TABLET ORAL DAILY
COMMUNITY
End: 2020-07-06 | Stop reason: HOSPADM

## 2020-02-26 PROBLEM — K62.5 RECTAL BLEED: Status: ACTIVE | Noted: 2020-02-26

## 2020-02-26 PROBLEM — Z86.010 HISTORY OF COLON POLYPS: Status: ACTIVE | Noted: 2020-02-26

## 2020-03-19 ENCOUNTER — TELEPHONE (OUTPATIENT)
Dept: SURGERY | Facility: CLINIC | Age: 80
End: 2020-03-19

## 2020-03-19 NOTE — TELEPHONE ENCOUNTER
[facility] called about pt's colonoscopy.  I do not see that he had it.  I do see he had cardiac issues.  Will he be rescheduled?  Thanks.

## 2020-07-04 ENCOUNTER — APPOINTMENT (OUTPATIENT)
Dept: ULTRASOUND IMAGING | Facility: HOSPITAL | Age: 80
End: 2020-07-04

## 2020-07-04 ENCOUNTER — HOSPITAL ENCOUNTER (INPATIENT)
Facility: HOSPITAL | Age: 80
LOS: 2 days | Discharge: HOME | End: 2020-07-06
Attending: INTERNAL MEDICINE | Admitting: INTERNAL MEDICINE

## 2020-07-04 ENCOUNTER — APPOINTMENT (OUTPATIENT)
Dept: GENERAL RADIOLOGY | Facility: HOSPITAL | Age: 80
End: 2020-07-04

## 2020-07-04 DIAGNOSIS — I21.21 STEMI INVOLVING LEFT CIRCUMFLEX CORONARY ARTERY (HCC): Primary | ICD-10-CM

## 2020-07-04 PROBLEM — I21.3 ST ELEVATION MYOCARDIAL INFARCTION (STEMI) (HCC): Status: ACTIVE | Noted: 2020-07-04

## 2020-07-04 LAB
ACT BLD: 298 SECONDS (ref 82–152)
ALBUMIN SERPL-MCNC: 3 G/DL (ref 3.5–5.2)
ALBUMIN/GLOB SERPL: 1.3 G/DL
ALP SERPL-CCNC: 71 U/L (ref 39–117)
ALT SERPL W P-5'-P-CCNC: 38 U/L (ref 1–41)
AMPHET+METHAMPHET UR QL: NEGATIVE
AMPHETAMINES UR QL: NEGATIVE
ANION GAP SERPL CALCULATED.3IONS-SCNC: 10.3 MMOL/L (ref 5–15)
ANION GAP SERPL CALCULATED.3IONS-SCNC: 9.1 MMOL/L (ref 5–15)
APTT PPP: >200 SECONDS (ref 70–100)
AST SERPL-CCNC: 34 U/L (ref 1–40)
B PERT DNA SPEC QL NAA+PROBE: NOT DETECTED
BARBITURATES UR QL SCN: NEGATIVE
BENZODIAZ UR QL SCN: NEGATIVE
BILIRUB SERPL-MCNC: 0.8 MG/DL (ref 0.2–1.2)
BILIRUB UR QL STRIP: NEGATIVE
BUN SERPL-MCNC: 37 MG/DL (ref 8–23)
BUN SERPL-MCNC: 38 MG/DL (ref 8–23)
BUN/CREAT SERPL: 15.2 (ref 7–25)
BUN/CREAT SERPL: 17.1 (ref 7–25)
BUPRENORPHINE SERPL-MCNC: NEGATIVE NG/ML
C PNEUM DNA NPH QL NAA+NON-PROBE: NOT DETECTED
CALCIUM SPEC-SCNC: 7.7 MG/DL (ref 8.6–10.5)
CALCIUM SPEC-SCNC: 8.8 MG/DL (ref 8.6–10.5)
CANNABINOIDS SERPL QL: NEGATIVE
CHLORIDE SERPL-SCNC: 102 MMOL/L (ref 98–107)
CHLORIDE SERPL-SCNC: 99 MMOL/L (ref 98–107)
CHOLEST SERPL-MCNC: 112 MG/DL (ref 0–200)
CLARITY UR: CLEAR
CO2 SERPL-SCNC: 21.9 MMOL/L (ref 22–29)
CO2 SERPL-SCNC: 24.7 MMOL/L (ref 22–29)
COCAINE UR QL: NEGATIVE
COLOR UR: YELLOW
CREAT SERPL-MCNC: 2.16 MG/DL (ref 0.76–1.27)
CREAT SERPL-MCNC: 2.5 MG/DL (ref 0.76–1.27)
CRP SERPL-MCNC: 3.27 MG/DL (ref 0–0.5)
D DIMER PPP FEU-MCNC: 0.72 MCGFEU/ML (ref 0–0.57)
D-LACTATE SERPL-SCNC: 2.1 MMOL/L (ref 0.5–2)
D-LACTATE SERPL-SCNC: 2.5 MMOL/L (ref 0.5–2)
DEPRECATED RDW RBC AUTO: 47.4 FL (ref 37–54)
DEPRECATED RDW RBC AUTO: 47.7 FL (ref 37–54)
ERYTHROCYTE [DISTWIDTH] IN BLOOD BY AUTOMATED COUNT: 13.8 % (ref 12.3–15.4)
ERYTHROCYTE [DISTWIDTH] IN BLOOD BY AUTOMATED COUNT: 14.1 % (ref 12.3–15.4)
FERRITIN SERPL-MCNC: 164.9 NG/ML (ref 30–400)
FLUAV H1 2009 PAND RNA NPH QL NAA+PROBE: NOT DETECTED
FLUAV H1 HA GENE NPH QL NAA+PROBE: NOT DETECTED
FLUAV H3 RNA NPH QL NAA+PROBE: NOT DETECTED
FLUAV SUBTYP SPEC NAA+PROBE: NOT DETECTED
FLUBV RNA ISLT QL NAA+PROBE: NOT DETECTED
GFR SERPL CREATININE-BSD FRML MDRD: 25 ML/MIN/1.73
GFR SERPL CREATININE-BSD FRML MDRD: 30 ML/MIN/1.73
GLOBULIN UR ELPH-MCNC: 2.4 GM/DL
GLUCOSE BLDC GLUCOMTR-MCNC: 164 MG/DL (ref 70–130)
GLUCOSE BLDC GLUCOMTR-MCNC: 164 MG/DL (ref 70–130)
GLUCOSE BLDC GLUCOMTR-MCNC: 178 MG/DL (ref 70–130)
GLUCOSE BLDC GLUCOMTR-MCNC: 196 MG/DL (ref 70–130)
GLUCOSE SERPL-MCNC: 162 MG/DL (ref 65–99)
GLUCOSE SERPL-MCNC: 165 MG/DL (ref 65–99)
GLUCOSE UR STRIP-MCNC: NEGATIVE MG/DL
HADV DNA SPEC NAA+PROBE: NOT DETECTED
HBA1C MFR BLD: 6.2 % (ref 4.8–5.6)
HCOV 229E RNA SPEC QL NAA+PROBE: NOT DETECTED
HCOV HKU1 RNA SPEC QL NAA+PROBE: NOT DETECTED
HCOV NL63 RNA SPEC QL NAA+PROBE: NOT DETECTED
HCOV OC43 RNA SPEC QL NAA+PROBE: NOT DETECTED
HCT VFR BLD AUTO: 37.4 % (ref 37.5–51)
HCT VFR BLD AUTO: 40 % (ref 37.5–51)
HCT VFR BLD AUTO: 40.1 % (ref 37.5–51)
HCT VFR BLD AUTO: 42 % (ref 37.5–51)
HDLC SERPL-MCNC: 34 MG/DL (ref 40–60)
HGB BLD-MCNC: 12.1 G/DL (ref 13–17.7)
HGB BLD-MCNC: 12.9 G/DL (ref 13–17.7)
HGB BLD-MCNC: 13.1 G/DL (ref 13–17.7)
HGB BLD-MCNC: 13.8 G/DL (ref 13–17.7)
HGB UR QL STRIP.AUTO: NEGATIVE
HMPV RNA NPH QL NAA+NON-PROBE: NOT DETECTED
HPIV1 RNA SPEC QL NAA+PROBE: NOT DETECTED
HPIV2 RNA SPEC QL NAA+PROBE: NOT DETECTED
HPIV3 RNA NPH QL NAA+PROBE: NOT DETECTED
HPIV4 P GENE NPH QL NAA+PROBE: NOT DETECTED
INR PPP: 1.52 (ref 0.9–1.1)
INR PPP: 1.72 (ref 0.9–1.1)
KETONES UR QL STRIP: NEGATIVE
L PNEUMO1 AG UR QL IA: NEGATIVE
LACTATE HOLD SPECIMEN: NORMAL
LDH SERPL-CCNC: 223 U/L (ref 135–225)
LDLC SERPL CALC-MCNC: 63 MG/DL (ref 0–100)
LDLC/HDLC SERPL: 1.86 {RATIO}
LEUKOCYTE ESTERASE UR QL STRIP.AUTO: NEGATIVE
M PNEUMO IGG SER IA-ACNC: NOT DETECTED
MCH RBC QN AUTO: 30 PG (ref 26.6–33)
MCH RBC QN AUTO: 30.3 PG (ref 26.6–33)
MCHC RBC AUTO-ENTMCNC: 32.7 G/DL (ref 31.5–35.7)
MCHC RBC AUTO-ENTMCNC: 32.9 G/DL (ref 31.5–35.7)
MCV RBC AUTO: 91.3 FL (ref 79–97)
MCV RBC AUTO: 92.8 FL (ref 79–97)
METHADONE UR QL SCN: NEGATIVE
NITRITE UR QL STRIP: NEGATIVE
OPIATES UR QL: NEGATIVE
OXYCODONE UR QL SCN: NEGATIVE
PCP UR QL SCN: NEGATIVE
PH UR STRIP.AUTO: <=5 [PH] (ref 5–8)
PLATELET # BLD AUTO: 167 10*3/MM3 (ref 140–450)
PLATELET # BLD AUTO: 184 10*3/MM3 (ref 140–450)
PMV BLD AUTO: 10.6 FL (ref 6–12)
PMV BLD AUTO: 10.9 FL (ref 6–12)
POTASSIUM SERPL-SCNC: 4.7 MMOL/L (ref 3.5–5.2)
POTASSIUM SERPL-SCNC: 5.3 MMOL/L (ref 3.5–5.2)
POTASSIUM SERPL-SCNC: 5.4 MMOL/L (ref 3.5–5.2)
PROCALCITONIN SERPL-MCNC: 0.9 NG/ML (ref 0.1–0.25)
PROPOXYPH UR QL: NEGATIVE
PROT SERPL-MCNC: 5.4 G/DL (ref 6–8.5)
PROT UR QL STRIP: NEGATIVE
PROTHROMBIN TIME: 19.1 SECONDS (ref 12–15.1)
PROTHROMBIN TIME: 21.1 SECONDS (ref 12–15.1)
RBC # BLD AUTO: 4.32 10*6/MM3 (ref 4.14–5.8)
RBC # BLD AUTO: 4.6 10*6/MM3 (ref 4.14–5.8)
RHINOVIRUS RNA SPEC NAA+PROBE: DETECTED
RSV RNA NPH QL NAA+NON-PROBE: NOT DETECTED
S PNEUM AG SPEC QL LA: NEGATIVE
SARS-COV-2 RNA PNL SPEC NAA+PROBE: NOT DETECTED
SODIUM SERPL-SCNC: 133 MMOL/L (ref 136–145)
SODIUM SERPL-SCNC: 134 MMOL/L (ref 136–145)
SP GR UR STRIP: >=1.03 (ref 1–1.03)
TRICYCLICS UR QL SCN: NEGATIVE
TRIGL SERPL-MCNC: 74 MG/DL (ref 0–150)
TROPONIN T SERPL-MCNC: 0.03 NG/ML (ref 0–0.03)
UROBILINOGEN UR QL STRIP: NORMAL
VLDLC SERPL-MCNC: 14.8 MG/DL
WBC # BLD AUTO: 16.41 10*3/MM3 (ref 3.4–10.8)
WBC # BLD AUTO: 18.38 10*3/MM3 (ref 3.4–10.8)

## 2020-07-04 PROCEDURE — 80061 LIPID PANEL: CPT | Performed by: INTERNAL MEDICINE

## 2020-07-04 PROCEDURE — 63710000001 INSULIN ASPART PER 5 UNITS: Performed by: FAMILY MEDICINE

## 2020-07-04 PROCEDURE — C1725 CATH, TRANSLUMIN NON-LASER: HCPCS | Performed by: INTERNAL MEDICINE

## 2020-07-04 PROCEDURE — 80306 DRUG TEST PRSMV INSTRMNT: CPT | Performed by: FAMILY MEDICINE

## 2020-07-04 PROCEDURE — C9606 PERC D-E COR REVASC W AMI S: HCPCS | Performed by: INTERNAL MEDICINE

## 2020-07-04 PROCEDURE — 93005 ELECTROCARDIOGRAM TRACING: CPT | Performed by: INTERNAL MEDICINE

## 2020-07-04 PROCEDURE — C1887 CATHETER, GUIDING: HCPCS | Performed by: INTERNAL MEDICINE

## 2020-07-04 PROCEDURE — B2181ZZ FLUOROSCOPY OF LEFT INTERNAL MAMMARY BYPASS GRAFT USING LOW OSMOLAR CONTRAST: ICD-10-PCS | Performed by: INTERNAL MEDICINE

## 2020-07-04 PROCEDURE — 84132 ASSAY OF SERUM POTASSIUM: CPT | Performed by: INTERNAL MEDICINE

## 2020-07-04 PROCEDURE — 99221 1ST HOSP IP/OBS SF/LOW 40: CPT | Performed by: FAMILY MEDICINE

## 2020-07-04 PROCEDURE — 93459 L HRT ART/GRFT ANGIO: CPT | Performed by: INTERNAL MEDICINE

## 2020-07-04 PROCEDURE — 85730 THROMBOPLASTIN TIME PARTIAL: CPT | Performed by: INTERNAL MEDICINE

## 2020-07-04 PROCEDURE — B2151ZZ FLUOROSCOPY OF LEFT HEART USING LOW OSMOLAR CONTRAST: ICD-10-PCS | Performed by: INTERNAL MEDICINE

## 2020-07-04 PROCEDURE — 027035Z DILATION OF CORONARY ARTERY, ONE ARTERY WITH TWO DRUG-ELUTING INTRALUMINAL DEVICES, PERCUTANEOUS APPROACH: ICD-10-PCS | Performed by: INTERNAL MEDICINE

## 2020-07-04 PROCEDURE — 83605 ASSAY OF LACTIC ACID: CPT | Performed by: FAMILY MEDICINE

## 2020-07-04 PROCEDURE — C1894 INTRO/SHEATH, NON-LASER: HCPCS | Performed by: INTERNAL MEDICINE

## 2020-07-04 PROCEDURE — 81003 URINALYSIS AUTO W/O SCOPE: CPT | Performed by: INTERNAL MEDICINE

## 2020-07-04 PROCEDURE — 87899 AGENT NOS ASSAY W/OPTIC: CPT | Performed by: FAMILY MEDICINE

## 2020-07-04 PROCEDURE — 84145 PROCALCITONIN (PCT): CPT | Performed by: FAMILY MEDICINE

## 2020-07-04 PROCEDURE — 0 IOPAMIDOL PER 1 ML: Performed by: INTERNAL MEDICINE

## 2020-07-04 PROCEDURE — 85018 HEMOGLOBIN: CPT | Performed by: FAMILY MEDICINE

## 2020-07-04 PROCEDURE — 83036 HEMOGLOBIN GLYCOSYLATED A1C: CPT | Performed by: INTERNAL MEDICINE

## 2020-07-04 PROCEDURE — B2131ZZ FLUOROSCOPY OF MULTIPLE CORONARY ARTERY BYPASS GRAFTS USING LOW OSMOLAR CONTRAST: ICD-10-PCS | Performed by: INTERNAL MEDICINE

## 2020-07-04 PROCEDURE — 84484 ASSAY OF TROPONIN QUANT: CPT | Performed by: INTERNAL MEDICINE

## 2020-07-04 PROCEDURE — 83615 LACTATE (LD) (LDH) ENZYME: CPT | Performed by: FAMILY MEDICINE

## 2020-07-04 PROCEDURE — 0099U HC BIOFIRE FILMARRAY RESP PANEL 1: CPT | Performed by: FAMILY MEDICINE

## 2020-07-04 PROCEDURE — 85610 PROTHROMBIN TIME: CPT | Performed by: INTERNAL MEDICINE

## 2020-07-04 PROCEDURE — C1874 STENT, COATED/COV W/DEL SYS: HCPCS | Performed by: INTERNAL MEDICINE

## 2020-07-04 PROCEDURE — 94799 UNLISTED PULMONARY SVC/PX: CPT

## 2020-07-04 PROCEDURE — 85014 HEMATOCRIT: CPT | Performed by: FAMILY MEDICINE

## 2020-07-04 PROCEDURE — 85379 FIBRIN DEGRADATION QUANT: CPT | Performed by: FAMILY MEDICINE

## 2020-07-04 PROCEDURE — 92941 PRQ TRLML REVSC TOT OCCL AMI: CPT | Performed by: INTERNAL MEDICINE

## 2020-07-04 PROCEDURE — C1760 CLOSURE DEV, VASC: HCPCS | Performed by: INTERNAL MEDICINE

## 2020-07-04 PROCEDURE — B2111ZZ FLUOROSCOPY OF MULTIPLE CORONARY ARTERIES USING LOW OSMOLAR CONTRAST: ICD-10-PCS | Performed by: INTERNAL MEDICINE

## 2020-07-04 PROCEDURE — C1769 GUIDE WIRE: HCPCS | Performed by: INTERNAL MEDICINE

## 2020-07-04 PROCEDURE — 82962 GLUCOSE BLOOD TEST: CPT

## 2020-07-04 PROCEDURE — 86140 C-REACTIVE PROTEIN: CPT | Performed by: FAMILY MEDICINE

## 2020-07-04 PROCEDURE — 25010000002 CEFTRIAXONE SODIUM-DEXTROSE 1-3.74 GM-%(50ML) RECONSTITUTED SOLUTION: Performed by: FAMILY MEDICINE

## 2020-07-04 PROCEDURE — 80053 COMPREHEN METABOLIC PANEL: CPT | Performed by: INTERNAL MEDICINE

## 2020-07-04 PROCEDURE — 85027 COMPLETE CBC AUTOMATED: CPT | Performed by: INTERNAL MEDICINE

## 2020-07-04 PROCEDURE — 85347 COAGULATION TIME ACTIVATED: CPT

## 2020-07-04 PROCEDURE — 99223 1ST HOSP IP/OBS HIGH 75: CPT | Performed by: INTERNAL MEDICINE

## 2020-07-04 PROCEDURE — 87635 SARS-COV-2 COVID-19 AMP PRB: CPT | Performed by: FAMILY MEDICINE

## 2020-07-04 PROCEDURE — 94640 AIRWAY INHALATION TREATMENT: CPT

## 2020-07-04 PROCEDURE — 76775 US EXAM ABDO BACK WALL LIM: CPT

## 2020-07-04 PROCEDURE — 25010000003 LIDOCAINE 1 % SOLUTION: Performed by: INTERNAL MEDICINE

## 2020-07-04 PROCEDURE — 85014 HEMATOCRIT: CPT | Performed by: INTERNAL MEDICINE

## 2020-07-04 PROCEDURE — 82728 ASSAY OF FERRITIN: CPT | Performed by: FAMILY MEDICINE

## 2020-07-04 PROCEDURE — 25010000002 ONDANSETRON PER 1 MG: Performed by: INTERNAL MEDICINE

## 2020-07-04 PROCEDURE — 85018 HEMOGLOBIN: CPT | Performed by: INTERNAL MEDICINE

## 2020-07-04 PROCEDURE — 4A023N7 MEASUREMENT OF CARDIAC SAMPLING AND PRESSURE, LEFT HEART, PERCUTANEOUS APPROACH: ICD-10-PCS | Performed by: INTERNAL MEDICINE

## 2020-07-04 PROCEDURE — 87040 BLOOD CULTURE FOR BACTERIA: CPT | Performed by: FAMILY MEDICINE

## 2020-07-04 PROCEDURE — 71045 X-RAY EXAM CHEST 1 VIEW: CPT

## 2020-07-04 PROCEDURE — 25010000002 HEPARIN (PORCINE) PER 1000 UNITS: Performed by: INTERNAL MEDICINE

## 2020-07-04 DEVICE — XIENCE SIERRA™ EVEROLIMUS ELUTING CORONARY STENT SYSTEM 3.00 MM X 23 MM / RAPID-EXCHANGE
Type: IMPLANTABLE DEVICE | Status: FUNCTIONAL
Brand: XIENCE SIERRA™

## 2020-07-04 RX ORDER — POTASSIUM CHLORIDE 20 MEQ/1
20 TABLET, EXTENDED RELEASE ORAL DAILY
Status: DISCONTINUED | OUTPATIENT
Start: 2020-07-04 | End: 2020-07-04

## 2020-07-04 RX ORDER — SODIUM CHLORIDE 9 MG/ML
100 INJECTION, SOLUTION INTRAVENOUS CONTINUOUS
Status: ACTIVE | OUTPATIENT
Start: 2020-07-04 | End: 2020-07-04

## 2020-07-04 RX ORDER — NITROGLYCERIN 0.4 MG/1
0.4 TABLET SUBLINGUAL
Status: DISCONTINUED | OUTPATIENT
Start: 2020-07-04 | End: 2020-07-06 | Stop reason: HOSPADM

## 2020-07-04 RX ORDER — ONDANSETRON 4 MG/1
4 TABLET, FILM COATED ORAL EVERY 6 HOURS PRN
Status: DISCONTINUED | OUTPATIENT
Start: 2020-07-04 | End: 2020-07-06 | Stop reason: HOSPADM

## 2020-07-04 RX ORDER — HYDROCODONE BITARTRATE AND ACETAMINOPHEN 5; 325 MG/1; MG/1
1 TABLET ORAL EVERY 4 HOURS PRN
Status: DISCONTINUED | OUTPATIENT
Start: 2020-07-04 | End: 2020-07-06 | Stop reason: HOSPADM

## 2020-07-04 RX ORDER — SPIRONOLACTONE 25 MG/1
25 TABLET ORAL DAILY
Status: DISCONTINUED | OUTPATIENT
Start: 2020-07-04 | End: 2020-07-04

## 2020-07-04 RX ORDER — IPRATROPIUM BROMIDE AND ALBUTEROL SULFATE 2.5; .5 MG/3ML; MG/3ML
3 SOLUTION RESPIRATORY (INHALATION)
Status: DISCONTINUED | OUTPATIENT
Start: 2020-07-04 | End: 2020-07-06 | Stop reason: HOSPADM

## 2020-07-04 RX ORDER — BISACODYL 5 MG/1
10 TABLET, DELAYED RELEASE ORAL DAILY PRN
Status: DISCONTINUED | OUTPATIENT
Start: 2020-07-04 | End: 2020-07-06 | Stop reason: HOSPADM

## 2020-07-04 RX ORDER — ONDANSETRON 2 MG/ML
INJECTION INTRAMUSCULAR; INTRAVENOUS AS NEEDED
Status: DISCONTINUED | OUTPATIENT
Start: 2020-07-04 | End: 2020-07-04 | Stop reason: HOSPADM

## 2020-07-04 RX ORDER — ATORVASTATIN CALCIUM 40 MG/1
40 TABLET, FILM COATED ORAL NIGHTLY
Status: DISCONTINUED | OUTPATIENT
Start: 2020-07-04 | End: 2020-07-05

## 2020-07-04 RX ORDER — SODIUM CHLORIDE 9 MG/ML
30 INJECTION, SOLUTION INTRAVENOUS CONTINUOUS
Status: ACTIVE | OUTPATIENT
Start: 2020-07-04 | End: 2020-07-04

## 2020-07-04 RX ORDER — TEMAZEPAM 15 MG/1
15 CAPSULE ORAL NIGHTLY PRN
Status: DISCONTINUED | OUTPATIENT
Start: 2020-07-04 | End: 2020-07-06 | Stop reason: HOSPADM

## 2020-07-04 RX ORDER — NALOXONE HCL 0.4 MG/ML
0.4 VIAL (ML) INJECTION
Status: DISCONTINUED | OUTPATIENT
Start: 2020-07-04 | End: 2020-07-06 | Stop reason: HOSPADM

## 2020-07-04 RX ORDER — ASPIRIN 325 MG
325 TABLET ORAL DAILY
Status: DISCONTINUED | OUTPATIENT
Start: 2020-07-04 | End: 2020-07-04

## 2020-07-04 RX ORDER — LIDOCAINE HYDROCHLORIDE 10 MG/ML
INJECTION, SOLUTION INFILTRATION; PERINEURAL AS NEEDED
Status: DISCONTINUED | OUTPATIENT
Start: 2020-07-04 | End: 2020-07-04 | Stop reason: HOSPADM

## 2020-07-04 RX ORDER — ASPIRIN 81 MG/1
81 TABLET ORAL DAILY
Status: DISCONTINUED | OUTPATIENT
Start: 2020-07-04 | End: 2020-07-06 | Stop reason: HOSPADM

## 2020-07-04 RX ORDER — POLYETHYLENE GLYCOL 3350 17 G/17G
1 POWDER, FOR SOLUTION ORAL DAILY
Status: DISCONTINUED | OUTPATIENT
Start: 2020-07-04 | End: 2020-07-06 | Stop reason: HOSPADM

## 2020-07-04 RX ORDER — CEFTRIAXONE 1 G/50ML
1 INJECTION, SOLUTION INTRAVENOUS EVERY 24 HOURS
Status: DISCONTINUED | OUTPATIENT
Start: 2020-07-04 | End: 2020-07-05

## 2020-07-04 RX ORDER — HEPARIN SODIUM 1000 [USP'U]/ML
INJECTION, SOLUTION INTRAVENOUS; SUBCUTANEOUS AS NEEDED
Status: DISCONTINUED | OUTPATIENT
Start: 2020-07-04 | End: 2020-07-04 | Stop reason: HOSPADM

## 2020-07-04 RX ORDER — IPRATROPIUM BROMIDE AND ALBUTEROL SULFATE 2.5; .5 MG/3ML; MG/3ML
3 SOLUTION RESPIRATORY (INHALATION)
Status: DISCONTINUED | OUTPATIENT
Start: 2020-07-04 | End: 2020-07-04

## 2020-07-04 RX ORDER — AMIODARONE HYDROCHLORIDE 200 MG/1
200 TABLET ORAL DAILY
Status: DISCONTINUED | OUTPATIENT
Start: 2020-07-04 | End: 2020-07-06 | Stop reason: HOSPADM

## 2020-07-04 RX ORDER — ALPRAZOLAM 0.25 MG/1
0.25 TABLET ORAL 3 TIMES DAILY PRN
Status: DISCONTINUED | OUTPATIENT
Start: 2020-07-04 | End: 2020-07-06 | Stop reason: HOSPADM

## 2020-07-04 RX ORDER — ASPIRIN 325 MG
325 TABLET, DELAYED RELEASE (ENTERIC COATED) ORAL DAILY
Status: DISCONTINUED | OUTPATIENT
Start: 2020-07-04 | End: 2020-07-04

## 2020-07-04 RX ORDER — CLOPIDOGREL BISULFATE 75 MG/1
TABLET ORAL AS NEEDED
Status: DISCONTINUED | OUTPATIENT
Start: 2020-07-04 | End: 2020-07-04 | Stop reason: HOSPADM

## 2020-07-04 RX ORDER — DIPHENHYDRAMINE HYDROCHLORIDE 50 MG/ML
25 INJECTION INTRAMUSCULAR; INTRAVENOUS EVERY 6 HOURS PRN
Status: DISCONTINUED | OUTPATIENT
Start: 2020-07-04 | End: 2020-07-06 | Stop reason: HOSPADM

## 2020-07-04 RX ORDER — ONDANSETRON 2 MG/ML
4 INJECTION INTRAMUSCULAR; INTRAVENOUS EVERY 6 HOURS PRN
Status: DISCONTINUED | OUTPATIENT
Start: 2020-07-04 | End: 2020-07-06 | Stop reason: HOSPADM

## 2020-07-04 RX ORDER — PANTOPRAZOLE SODIUM 40 MG/1
40 TABLET, DELAYED RELEASE ORAL DAILY
Status: DISCONTINUED | OUTPATIENT
Start: 2020-07-04 | End: 2020-07-06 | Stop reason: HOSPADM

## 2020-07-04 RX ORDER — CARVEDILOL 25 MG/1
25 TABLET ORAL 2 TIMES DAILY WITH MEALS
Status: DISCONTINUED | OUTPATIENT
Start: 2020-07-04 | End: 2020-07-05

## 2020-07-04 RX ORDER — PAROXETINE 10 MG/1
5 TABLET, FILM COATED ORAL EVERY MORNING
Status: DISCONTINUED | OUTPATIENT
Start: 2020-07-04 | End: 2020-07-06 | Stop reason: HOSPADM

## 2020-07-04 RX ORDER — ACETAMINOPHEN 325 MG/1
650 TABLET ORAL EVERY 4 HOURS PRN
Status: DISCONTINUED | OUTPATIENT
Start: 2020-07-04 | End: 2020-07-06 | Stop reason: HOSPADM

## 2020-07-04 RX ORDER — TAMSULOSIN HYDROCHLORIDE 0.4 MG/1
0.4 CAPSULE ORAL DAILY
Status: DISCONTINUED | OUTPATIENT
Start: 2020-07-04 | End: 2020-07-06 | Stop reason: HOSPADM

## 2020-07-04 RX ORDER — AMLODIPINE BESYLATE 5 MG/1
5 TABLET ORAL DAILY
Status: DISCONTINUED | OUTPATIENT
Start: 2020-07-04 | End: 2020-07-05

## 2020-07-04 RX ORDER — NICOTINE POLACRILEX 4 MG
1 LOZENGE BUCCAL
Status: DISCONTINUED | OUTPATIENT
Start: 2020-07-04 | End: 2020-07-06 | Stop reason: HOSPADM

## 2020-07-04 RX ORDER — MORPHINE SULFATE 4 MG/ML
4 INJECTION, SOLUTION INTRAMUSCULAR; INTRAVENOUS
Status: DISCONTINUED | OUTPATIENT
Start: 2020-07-04 | End: 2020-07-06 | Stop reason: HOSPADM

## 2020-07-04 RX ORDER — FUROSEMIDE 40 MG/1
40 TABLET ORAL DAILY
Status: DISCONTINUED | OUTPATIENT
Start: 2020-07-04 | End: 2020-07-04

## 2020-07-04 RX ORDER — CLOPIDOGREL BISULFATE 75 MG/1
75 TABLET ORAL DAILY
Status: DISCONTINUED | OUTPATIENT
Start: 2020-07-04 | End: 2020-07-06 | Stop reason: HOSPADM

## 2020-07-04 RX ORDER — DEXTROSE MONOHYDRATE 25 G/50ML
25 INJECTION, SOLUTION INTRAVENOUS
Status: DISCONTINUED | OUTPATIENT
Start: 2020-07-04 | End: 2020-07-06 | Stop reason: HOSPADM

## 2020-07-04 RX ADMIN — CARVEDILOL 25 MG: 25 TABLET, FILM COATED ORAL at 10:42

## 2020-07-04 RX ADMIN — TAMSULOSIN HYDROCHLORIDE 0.4 MG: 0.4 CAPSULE ORAL at 10:41

## 2020-07-04 RX ADMIN — DOXYCYCLINE 100 MG: 100 INJECTION, POWDER, LYOPHILIZED, FOR SOLUTION INTRAVENOUS at 23:00

## 2020-07-04 RX ADMIN — INSULIN ASPART 2 UNITS: 100 INJECTION, SOLUTION INTRAVENOUS; SUBCUTANEOUS at 11:59

## 2020-07-04 RX ADMIN — SODIUM CHLORIDE 1000 ML: 9 INJECTION, SOLUTION INTRAVENOUS at 16:55

## 2020-07-04 RX ADMIN — SODIUM CHLORIDE 30 ML/HR: 9 INJECTION, SOLUTION INTRAVENOUS at 17:59

## 2020-07-04 RX ADMIN — ACETAMINOPHEN 650 MG: 325 TABLET, FILM COATED ORAL at 16:31

## 2020-07-04 RX ADMIN — AMIODARONE HYDROCHLORIDE 200 MG: 200 TABLET ORAL at 10:41

## 2020-07-04 RX ADMIN — CEFTRIAXONE 1 G: 1 INJECTION, SOLUTION INTRAVENOUS at 12:01

## 2020-07-04 RX ADMIN — LISINOPRIL 30 MG: 20 TABLET ORAL at 10:42

## 2020-07-04 RX ADMIN — POLYETHYLENE GLYCOL 3350 17 G: 17 POWDER, FOR SOLUTION ORAL at 10:41

## 2020-07-04 RX ADMIN — PAROXETINE HYDROCHLORIDE 5 MG: 10 TABLET, FILM COATED ORAL at 10:41

## 2020-07-04 RX ADMIN — CLOPIDOGREL BISULFATE 75 MG: 75 TABLET ORAL at 10:42

## 2020-07-04 RX ADMIN — DOXYCYCLINE 100 MG: 100 INJECTION, POWDER, LYOPHILIZED, FOR SOLUTION INTRAVENOUS at 12:55

## 2020-07-04 RX ADMIN — SODIUM CHLORIDE 500 ML: 9 INJECTION, SOLUTION INTRAVENOUS at 16:30

## 2020-07-04 RX ADMIN — AMLODIPINE BESYLATE 5 MG: 5 TABLET ORAL at 10:42

## 2020-07-04 RX ADMIN — IPRATROPIUM BROMIDE AND ALBUTEROL SULFATE 3 ML: .5; 3 SOLUTION RESPIRATORY (INHALATION) at 18:38

## 2020-07-04 RX ADMIN — ATORVASTATIN CALCIUM 40 MG: 40 TABLET, FILM COATED ORAL at 21:37

## 2020-07-04 RX ADMIN — ONDANSETRON 4 MG: 2 INJECTION INTRAMUSCULAR; INTRAVENOUS at 17:08

## 2020-07-04 RX ADMIN — SODIUM CHLORIDE 100 ML/HR: 9 INJECTION, SOLUTION INTRAVENOUS at 06:50

## 2020-07-04 RX ADMIN — PANTOPRAZOLE SODIUM 40 MG: 40 TABLET, DELAYED RELEASE ORAL at 10:42

## 2020-07-04 RX ADMIN — APIXABAN 2.5 MG: 2.5 TABLET, FILM COATED ORAL at 10:43

## 2020-07-04 NOTE — H&P
Patient Care Team:  Mady Coy MD as PCP - General (Internal Medicine)  Shar Obregon MD as PCP - [facility]  Shorty Guido MD as Consulting Physician (General Surgery)    Chief complaint : Shortness of breath    Subjective     Patient is a 80 y.o. male patient who called 911 after experiencing progressively worsening shortness of breath.  Paramedics performed a twelve-lead electrocardiogram which demonstrated normal sinus rhythm with a right bundle branch block.  There was no R waves across the mid and left precordium with ST segment elevation in leads V6, I and aVL.  There was no reciprocal ST segment depression.  The patient was a poor historian.  The patient reports a prior heart attack.  He reports having prior bypass surgery.  He indicates that he had a heart catheterization procedure 3 weeks ago but cannot recall where the procedure was performed or the name of his treating cardiologist.  After the procedure review of his medical records indicates that the patient originally had bypass surgery in 2000 with a LIMA to the LAD.  He underwent repeat coronary artery bypass surgery in 2016 with a CHARLES to the PDA, sequential reverse saphenous vein graft to the first and second obtuse marginal branches and a vein graft to the diagonal branch.  In 2017 the patient had another acute coronary syndrome with ventricular tachycardia.  The patient at that time had stenting of the sequential vein graft to the OM1 and OM 2.  The patient also had a dual-chamber biventricular ICD placed.  He has a history of paroxysmal atrial fibrillation.  He has a history of a prior embolic stroke.  He has a history of hypertension, type 2 diabetes mellitus and dyslipidemia.  He is a non-smoker.  The patient's most recent catheterization was demonstrated to show diffuse disease with no further revascularization options.  He was told at that time he would require medical therapy only.  His last known left  ventricular ejection fraction from 2017 was 45%.  Review of Systems   Pertinent items are noted in HPI    History  Past Medical History:   Diagnosis Date   • Anxiety    • Arnold-Chiari malformation, type I (CMS/H[facility])     followed by Dr. Martinez, but last note available was 2014   • BPH (benign prostatic hyperplasia)    • Chronic Pleural effusion on right    • Complex partial seizure (CMS/[facility])     followed by Dr. Martinez, but last note available was 2014   • Congestive heart failure (CMS/[facility])     follows with Dr. Pompa at [facility], EF 30%   • Coronary artery disease     on ASA/plavix   • CVA (cerebral vascular accident) (CMS/[facility])     apical thrombus on chronic coumadin   • Diabetes mellitus (CMS/[facility])    • ED (erectile dysfunction)    • GERD (gastroesophageal reflux disease)    • Hyperlipidemia    • Hypertension    • Ischemic cardiomyopathy    • Myocardial infarction (CMS/[facility])    • Non-sustained ventricular tachycardia (CMS/HCC)    • Renal disorder      Past Surgical History:   Procedure Laterality Date   • CARDIAC CATHETERIZATION  02/2016    [facility], 3 vessel disease, patent LIMA to LAD bypass graft    • CARDIAC CATHETERIZATION  05/25/2017    [facility], severe 3 vessel disease, patent LIMA to LAD bypass graft, recommend medical management    • CARDIAC DEFIBRILLATOR PLACEMENT  08/2009    w/ PPM Medtronic (DDD)   • COLONOSCOPY W/ BIOPSIES     • CORONARY ANGIOPLASTY WITH STENT PLACEMENT     • CORONARY ARTERY BYPASS GRAFT  2000    [facility]   • INSERT / REPLACE / REMOVE PACEMAKER  05/26/2017    Medtronic generator change, Dr. Ho, [facility]    • INTERVENTIONAL RADIOLOGY PROCEDURE Bilateral 6/1/2017    Procedure: Carotid Cerebral Angiogram;  Surgeon: Wil Rodrigez MD;  Location: [facility] INVASIVE LOCATION;  Service:      Family History   Problem Relation Age of Onset   • Diabetes Mother    • Hypertension Mother    • Heart disease Mother    • Heart disease Father    • Stroke Father    • Heart disease Sister    • Diabetes Other    • Heart  disease Other    • Hypertension Other    • Stroke Other      Social History     Tobacco Use   • Smoking status: Never Smoker   • Smokeless tobacco: Never Used   Substance Use Topics   • Alcohol use: No   • Drug use: No     Medications Prior to Admission   Medication Sig Dispense Refill Last Dose   • amiodarone (PACERONE) 200 MG tablet Take 200 mg by mouth Daily.   Taking   • amLODIPine (NORVASC) 10 MG tablet Take 0.5 tablets by mouth Daily.   Taking   • apixaban (ELIQUIS) 2.5 MG tablet tablet Take 2.5 mg by mouth 2 (Two) Times a Day.   Taking   • aspirin 325 MG tablet Take 1 tablet by mouth Daily. (Patient taking differently: Take 81 mg by mouth Daily.) 30 tablet 3 Not Taking   • atorvastatin (LIPITOR) 40 MG tablet Take 40 mg by mouth Every Night.   Taking   • bisacodyl (DULCOLAX) 5 MG EC tablet Clear liquid diet day prior to colonoscopy. 2 at 3pm and 2 at 7pm. 4 tablet 0    • carvedilol (COREG) 6.25 MG tablet Take 25 mg by mouth 2 (Two) Times a Day.   Taking   • clopidogrel (PLAVIX) 75 MG tablet Take 75 mg by mouth 2 (Two) Times a Day.   Taking   • furosemide (LASIX) 40 MG tablet Take 40 mg by mouth Daily.   Taking   • lisinopril (PRINIVIL,ZESTRIL) 20 MG tablet Take 30 mg by mouth 2 (Two) Times a Day.   Not Taking   • nitroglycerin (NITROSTAT) 0.4 MG SL tablet Place 0.4 mg under the tongue Every 5 (Five) Minutes As Needed for Chest Pain. Take no more than 3 doses in 15 minutes.   Taking   • pantoprazole (PROTONIX) 40 MG EC tablet Take 40 mg by mouth Daily.   Not Taking   • PARoxetine (PAXIL) 10 MG tablet Take 5 mg by mouth Every Morning.   Not Taking   • polyethylene glycol (MIRALAX) packet Take 17 g by mouth Daily.   Taking   • potassium chloride (KLOR-CON) 20 MEQ CR tablet Take 20 mEq by mouth Daily.   Taking   • spironolactone (ALDACTONE) 25 MG tablet Take 25 mg by mouth Daily.   Taking   • tamsulosin (FLOMAX) 0.4 MG capsule 24 hr capsule Take 1 capsule by mouth Daily. 30 capsule 11      Allergies:   Latex    Objective     Vital Signs  Heart Rate:  [60-69] 60  Resp:  [18] 18  BP: (104-115)/(57-59) 115/57    Physical Exam:      General Appearance:    Alert, cooperative, in no acute distress   Head:    Normocephalic, without obvious abnormality, atraumatic   Eyes:            Lids and lashes normal, conjunctivae and sclerae normal, no   icterus, no pallor, corneas clear, PERRLA   Ears:    Ears appear intact with no abnormalities noted   Throat:   No oral lesions, no thrush, oral mucosa moist   Neck:   No adenopathy, supple, trachea midline, no thyromegaly, no   carotid bruit, no JVD   Back:     No kyphosis present, no scoliosis present, no skin lesions,      erythema or scars, no tenderness to percussion or                   palpation,   range of motion normal   Lungs:     Clear to auscultation,respirations regular, even and                  unlabored    Heart:    Regular rhythm and normal rate, normal S1 and S2, no            murmur, no gallop, no rub, no click   Chest Wall:    No abnormalities observed   Abdomen:     Normal bowel sounds, no masses, no organomegaly, soft        non-tender, non-distended, no guarding, no rebound                tenderness   Rectal:     Deferred   Extremities:   Moves all extremities well, no edema, no cyanosis, no             redness   Pulses:   Pulses palpable and equal bilaterally   Skin:   No bleeding, bruising or rash   Lymph nodes:   No palpable adenopathy   Neurologic:   Cranial nerves 2 - 12 grossly intact, sensation intact, DTR       present and equal bilaterally     Results Review:    I reviewed the patient's new clinical results.    Assessment/Plan       STEMI involving left circumflex coronary artery (CMS/HCC)    Acute embolic Left MCA CVA at admission. INR subtherapeutic at 1.1. Successful thrombectomy 6/1/17    Diabetes mellitus (CMS/HCC)    Ischemic cardiomyopathy    Hyperlipidemia    Hypertension    H/O NSVT status post PPM/ICD    Coronary artery disease with CABG in  2000, and 2016. Stent placed February 2017    History of PAF    ST elevation myocardial infarction (STEMI) (CMS/[facility])      The patient's twelve-lead electrocardiogram is atypical for an ST elevation myocardial infarction.  There are no old EKGs for comparison.  The ECG is suspicious for LV aneurysm with suspected persistent lateral and high lateral ST elevation.  A face-to-face patient level discussion was engaged discussing the rationale behind proceeding with invasive testing/procedure.  The procedure was explained in detail to the patient including risks benefits and alternatives.  Further recommendations will be predicated on the results of his catheterization findings.    I discussed the patients findings and my recommendations with patient.  No family members were available at the time of patient presentation.    Vasyl Sue MD  07/04/20  05:05

## 2020-07-04 NOTE — CONSULTS
Referring Provider: Dr Sue  Reason for Consultation: renal issues    Patient Care Team:  Mady Coy MD as PCP - General (Internal Medicine)  Shar Obregon MD as PCP - Claims Attributed  Shorty Guido MD as Consulting Physician (General Surgery)    Chief complaint     Subjective .     History of present illness:  Asked to see for renal insufficiency    Patient is a very poor historian, thinks he sees a nephrologist  in [place], no other info  Known heart dz, presented with SOA and Cardiology saw and took to cath lab where he had 2 stents placed  He is on IVF's here in ICU    Feels a little better  Some cough with clear sputum  No CP now  No swelling  No dysuria  Note lasix and aldactone use    Review of Systems    General : No pain, no fevers/chills, + weight loss reported  HEENT: No headache, no nosebleed, no sore throat, no blurry vision  Chest : no chest pain, no palpitations, no chest wall pain  Respiratory: +SOA,  no hemoptysis  GI:  No N/V/D, no abdominal pain, no BRBPR, no melena  Skin : no rashes, no pruritus  Musculoskeletal : no flank pain, no joint effusions  Neuro : + weakness, no numbness, no dizziness, no double vision  Endocrine: no heat/cold intolerance, no weight loss  Genitourinary: no dysuria or hematuria, no frequency  Psychiatric: no insomnia, no depression, no anxiety  Heme: + easy bruising and bleeding  Vascular: no cyanosis or extremity pain        Past Medical History:   Diagnosis Date   • Anxiety    • Arnold-Chiari malformation, type I (CMS/[facility])     followed by Dr. Martinez, but last note available was 2014   • BPH (benign prostatic hyperplasia)    • Chronic Pleural effusion on right    • Complex partial seizure (CMS/[facility])     followed by Dr. Martinez, but last note available was 2014   • Congestive heart failure (CMS/[facility])     follows with Dr. Pompa at [facility], EF 30%   • Coronary artery disease     on ASA/plavix   • CVA (cerebral vascular accident) (CMS/[facility])     apical  thrombus on chronic coumadin   • Diabetes mellitus (CMS/HCC)    • ED (erectile dysfunction)    • GERD (gastroesophageal reflux disease)    • Hyperlipidemia    • Hypertension    • Ischemic cardiomyopathy    • Myocardial infarction (CMS/HCC)    • Non-sustained ventricular tachycardia (CMS/HCC)    • Renal disorder      Past Surgical History:   Procedure Laterality Date   • CARDIAC CATHETERIZATION  02/2016    SJM, 3 vessel disease, patent LIMA to LAD bypass graft    • CARDIAC CATHETERIZATION  05/25/2017    SJM, severe 3 vessel disease, patent LIMA to LAD bypass graft, recommend medical management    • CARDIAC DEFIBRILLATOR PLACEMENT  08/2009    w/ PPM Medtronic (DDD)   • COLONOSCOPY W/ BIOPSIES     • CORONARY ANGIOPLASTY WITH STENT PLACEMENT     • CORONARY ARTERY BYPASS GRAFT  2000    [facility]   • INSERT / REPLACE / REMOVE PACEMAKER  05/26/2017    Medtronic generator change, Dr. Ho, [facility]    • INTERVENTIONAL RADIOLOGY PROCEDURE Bilateral 6/1/2017    Procedure: Carotid Cerebral Angiogram;  Surgeon: Wil Rodrigez MD;  Location: [facility] INVASIVE LOCATION;  Service:      Family History   Problem Relation Age of Onset   • Diabetes Mother    • Hypertension Mother    • Heart disease Mother    • Heart disease Father    • Stroke Father    • Heart disease Sister    • Diabetes Other    • Heart disease Other    • Hypertension Other    • Stroke Other      Social History     Tobacco Use   • Smoking status: Never Smoker   • Smokeless tobacco: Never Used   Substance Use Topics   • Alcohol use: No   • Drug use: No     Medications Prior to Admission   Medication Sig Dispense Refill Last Dose   • atorvastatin (LIPITOR) 40 MG tablet Take 80 mg by mouth Every Night.   Patient Taking Differently at Unknown time   • carvedilol (COREG) 6.25 MG tablet Take 6.25 mg by mouth 2 (Two) Times a Day.   Patient Taking Differently at Unknown time   • amiodarone (PACERONE) 200 MG tablet Take 200 mg by mouth Daily.   Taking   • apixaban (ELIQUIS)  2.5 MG tablet tablet Take 2.5 mg by mouth 2 (Two) Times a Day.   Taking   • aspirin 325 MG tablet Take 1 tablet by mouth Daily. (Patient taking differently: Take 81 mg by mouth Daily.) 30 tablet 3 Not Taking   • bisacodyl (DULCOLAX) 5 MG EC tablet Clear liquid diet day prior to colonoscopy. 2 at 3pm and 2 at 7pm. 4 tablet 0    • clopidogrel (PLAVIX) 75 MG tablet Take 75 mg by mouth 2 (Two) Times a Day.   Taking   • furosemide (LASIX) 40 MG tablet Take 40 mg by mouth Daily.   Taking   • nitroglycerin (NITROSTAT) 0.4 MG SL tablet Place 0.4 mg under the tongue Every 5 (Five) Minutes As Needed for Chest Pain. Take no more than 3 doses in 15 minutes.   Taking   • pantoprazole (PROTONIX) 40 MG EC tablet Take 40 mg by mouth Daily.   Not Taking   • PARoxetine (PAXIL) 10 MG tablet Take 5 mg by mouth Every Morning.   Not Taking   • polyethylene glycol (MIRALAX) packet Take 17 g by mouth Daily.   Taking   • potassium chloride (KLOR-CON) 20 MEQ CR tablet Take 20 mEq by mouth Daily.   Taking   • spironolactone (ALDACTONE) 25 MG tablet Take 25 mg by mouth Daily.   Taking   • tamsulosin (FLOMAX) 0.4 MG capsule 24 hr capsule Take 1 capsule by mouth Daily. 30 capsule 11        amiodarone 200 mg Oral Daily   amLODIPine 5 mg Oral Daily   apixaban 2.5 mg Oral Q12H   aspirin 81 mg Oral Daily   atorvastatin 40 mg Oral Nightly   carvedilol 25 mg Oral BID With Meals   cefTRIAXone 1 g Intravenous Q24H   clopidogrel 75 mg Oral Daily   doxycycline (VIBRAMYCIN) IVPB 100 mg Intravenous Q12H   insulin aspart 0-7 Units Subcutaneous TID AC   ipratropium-albuterol 3 mL Nebulization 4x Daily - RT   ipratropium-albuterol 3 mL Nebulization Q6H - RT   lisinopril 30 mg Oral Q24H   pantoprazole 40 mg Oral Daily   PARoxetine 5 mg Oral QAM   polyethylene glycol 1 packet Oral Daily   tamsulosin 0.4 mg Oral Daily        Allergies:   Latex    Objective     Vital Signs   Temp:  [98.9 °F (37.2 °C)-100 °F (37.8 °C)] 99.2 °F (37.3 °C)  Heart Rate:  [60-78]  65  Resp:  [16-23] 20  BP: (104-124)/(52-68) 104/60      Intake/Output Summary (Last 24 hours) at 7/4/2020 1304  Last data filed at 7/4/2020 0700  Gross per 24 hour   Intake --   Output 250 ml   Net -250 ml       Physical Exam:      General Appearance:    Alert,  in no acute distress, frail\thin   Head:    Normocephalic, without obvious abnormality, atraumatic   Eyes:           conjunctivae and sclerae normal, no icterus, no pallor   Ears:    Ears appear intact , no ear canal drainage   Throat:   No oral lesions, no thrush, oral mucosa moist   Neck:   No adenopathy, supple   Back:      No tenderness to percussion or palpation   Lungs:     No wheezes or rhonchi,respirations regular, even and                  unlabored    Heart:    Regular rate, normal S1 and S2, no murmur, no gallop, no rub   Chest Wall:    No abnormalities observed, no chest wall tenderness to palpation   Abdomen:     Normal bowel sounds, no masses,  soft non-tender, non-distended, no guarding, no rebound    Rectal:     Deferred   Extremities:    No Lower extremity edema, no cyanosis   Pulses:   Radial Pulses palpable   Skin:   No  rashes   Lymph nodes:   No palpable adenopathy in neck    Neurologic:   Cranial nerves 2 - 12 grossly intact, no gross motor deficits          Results Review:     Results from last 7 days   Lab Units 07/04/20  0924 07/04/20  0658 07/04/20  0500   SODIUM mmol/L  --  134* 133*   POTASSIUM mmol/L 5.3* 5.4* 4.7   CHLORIDE mmol/L  --  99 102   CO2 mmol/L  --  24.7 21.9*   BUN mg/dL  --  38* 37*   CREATININE mg/dL  --  2.50* 2.16*   GLUCOSE mg/dL  --  162* 165*   CALCIUM mg/dL  --  8.8 7.7*     Results from last 7 days   Lab Units 07/04/20  0658 07/04/20  0500   WBC 10*3/mm3 18.38* 16.41*   HEMOGLOBIN g/dL 13.8 13.1   HEMATOCRIT % 42.0 40.1   PLATELETS 10*3/mm3 184 167              Assessment/Plan       STEMI involving left circumflex coronary artery (CMS/HCC)    Acute embolic Left MCA CVA at admission. INR subtherapeutic at  "1.1. Successful thrombectomy 6/1/17    Diabetes mellitus (CMS/HCC)    Ischemic cardiomyopathy    Hyperlipidemia    Hypertension    H/O NSVT status post PPM/ICD    Coronary artery disease with CABG in 2000, and 2016. Stent placed February 2017    History of PAF    ST elevation myocardial infarction (STEMI) (CMS/HCC)    1. STEPHANIE on CKD 3 ( 1.4 creatinine here last year, ? More recent levels)--- underlying vascular/HTN disease.  Check UA and U\S    Had emergent cath and stents, did get some IVF's  Will add Mucomyst  At risk for ANABEL, will need to monitor kidneys and urine output closely  Hold ACE\Aldactone\lasix for now    2. Hyperkalemia- stop KCL and ACE\Aldactone    3. CHF - AICD-- careful with IVF's    4. CAD S\P stents    5. HTN-  Avoid low BP's    6. Advanced age/frailty -  Prognosis guarded    Plan:    KVO IVF\"s after cath prep  Mucomyst  UA, U\S    Am labs    Monitor for need for dialysis            "

## 2020-07-04 NOTE — CONSULTS
[facility] HOSPITALIST   HISTORY AND PHYSICAL      Name:  Pancho Bonner   Age:  80 y.o.  Sex:  male  :  1940  MRN:  [id number]   Visit Number:  [id number]  Admission Date:  2020  Date Of Service:  20  Primary Care Physician:  Mady Coy MD    Chief Complaint: fever 100 with diabetes. Consult for medical management of diabetes        History Of Presenting Illness:  The patient is an 80-year-old gentleman with chronic lung disease, frequent bronchitis, coronary artery disease post stent after experiencing ST elevation MI.  He was admitted post-cath by Dr. mitchell and the hospitalist service is consulted today for medical management of diabetes along with fever of 100.  The patient states that he has had increased amount of cough with white sputum, denied fever, denied exposure to COVID-19.  He denies chest pain.  He does have some mild shortness of breath.  He does have anxiety.  He is wearing nasal cannula oxygen and is in no acute distress.  He does have a right IV antecubital fossa location with trace blood.  Later today a right forearm hematoma noted with small blood loss, stopped with pressure and hemoglobin evaluated without need for concern at this time.  Pressure clamp was placed at the cath site to the right groin.  Initially IV fluids were running this morning at 100 cc/h but when they were dropped to 30 cc/h this afternoon, his blood pressure continued to drop.  Fluid bolus was provided.  Dr. mitchell was made aware.  I discussed the case with the nurse many times.        Review Of Systems:     General ROS: Patient denies any fevers, chills or loss of consciousness.  Denies generalized weakness.  Anxious  Psychological ROS: Denies any hallucinations and delusions.  Ophthalmic ROS: Denies any diplopia or transient loss of vision.  ENT ROS: Denies sore throat, nasal congestion or ear pain.   Allergy and Immunology ROS: Denies rash or itching.  Hematological  and Lymphatic ROS: Denies neck swelling or easy bleeding.  Endocrine ROS: Denies any recent unintentional weight gain or loss.  Breast ROS: Denies any pain or swelling.  Respiratory ROS: Positive cough with mild shortness of breath.   Cardiovascular ROS: Denies chest pain or palpitations. No history of exertional chest pain.  Gastrointestinal ROS: Denies nausea and vomiting. Denies any abdominal pain. No diarrhea.  Genitourinary ROS: Denies dysuria or hematuria.  Musculoskeletal ROS: Denies chronic back pain. No muscle pain. No calf pain.  Positive right groin pain at pressure site  Neurological ROS: Denies any focal weakness. No loss of consciousness. Denies any numbness. Denies neck pain.   Dermatological ROS: Denies any redness or pruritis.       Past Medical History: Reviewed    Past Medical History:   Diagnosis Date   • Anxiety    • Arnold-Chiari malformation, type I (CMS/HCC)     followed by Dr. Martinez, but last note available was 2014   • BPH (benign prostatic hyperplasia)    • Chronic Pleural effusion on right    • Complex partial seizure (CMS/HCC)     followed by Dr. Martinez, but last note available was 2014   • Congestive heart failure (CMS/HCC)     follows with Dr. Pompa at [facility], EF 30%   • Coronary artery disease     on ASA/plavix   • CVA (cerebral vascular accident) (CMS/HCC)     apical thrombus on chronic coumadin   • Diabetes mellitus (CMS/HCC)    • ED (erectile dysfunction)    • GERD (gastroesophageal reflux disease)    • Hyperlipidemia    • Hypertension    • Ischemic cardiomyopathy    • Myocardial infarction (CMS/HCC)    • Non-sustained ventricular tachycardia (CMS/HCC)    • Renal disorder        Past Surgical history: Reviewed    Past Surgical History:   Procedure Laterality Date   • CARDIAC CATHETERIZATION  02/2016    [facility], 3 vessel disease, patent LIMA to LAD bypass graft    • CARDIAC CATHETERIZATION  05/25/2017    [facility], severe 3 vessel disease, patent LIMA to LAD bypass graft, recommend medical  management    • CARDIAC DEFIBRILLATOR PLACEMENT  08/2009    w/ PPM Medtronic (DDD)   • COLONOSCOPY W/ BIOPSIES     • CORONARY ANGIOPLASTY WITH STENT PLACEMENT     • CORONARY ARTERY BYPASS GRAFT  2000    [facility]   • INSERT / REPLACE / REMOVE PACEMAKER  05/26/2017    Medtronic generator change, Dr. Ho, [facility]    • INTERVENTIONAL RADIOLOGY PROCEDURE Bilateral 6/1/2017    Procedure: Carotid Cerebral Angiogram;  Surgeon: Wil Rodrigez MD;  Location: [facility] INVASIVE LOCATION;  Service:        Social History: Denied tobacco, alcohol, drug use.    Pediatric History   Patient Guardian Status   • Not on file     Other Topics Concern   • Not on file   Social History Narrative   • Not on file       Family History: Reviewed    Family History   Problem Relation Age of Onset   • Diabetes Mother    • Hypertension Mother    • Heart disease Mother    • Heart disease Father    • Stroke Father    • Heart disease Sister    • Diabetes Other    • Heart disease Other    • Hypertension Other    • Stroke Other        Allergies:      Latex    Home Medications:    Prior to Admission Medications     Prescriptions Last Dose Informant Patient Reported? Taking?    atorvastatin (LIPITOR) 40 MG tablet Patient Taking Differently  Yes Yes    Take 80 mg by mouth Every Night.    carvedilol (COREG) 6.25 MG tablet Patient Taking Differently  Yes Yes    Take 6.25 mg by mouth 2 (Two) Times a Day.    amiodarone (PACERONE) 200 MG tablet  Pharmacy Yes No    Take 200 mg by mouth Daily.    apixaban (ELIQUIS) 2.5 MG tablet tablet   Yes No    Take 2.5 mg by mouth 2 (Two) Times a Day.    aspirin 325 MG tablet   No No    Take 1 tablet by mouth Daily.    Patient taking differently:  Take 81 mg by mouth Daily.    bisacodyl (DULCOLAX) 5 MG EC tablet   No No    Clear liquid diet day prior to colonoscopy. 2 at 3pm and 2 at 7pm.    clopidogrel (PLAVIX) 75 MG tablet   Yes No    Take 75 mg by mouth 2 (Two) Times a Day.    furosemide (LASIX) 40 MG tablet   Yes  No    Take 40 mg by mouth Daily.    nitroglycerin (NITROSTAT) 0.4 MG SL tablet   Yes No    Place 0.4 mg under the tongue Every 5 (Five) Minutes As Needed for Chest Pain. Take no more than 3 doses in 15 minutes.    pantoprazole (PROTONIX) 40 MG EC tablet   Yes No    Take 40 mg by mouth Daily.    PARoxetine (PAXIL) 10 MG tablet   Yes No    Take 5 mg by mouth Every Morning.    polyethylene glycol (MIRALAX) packet   Yes No    Take 17 g by mouth Daily.    potassium chloride (KLOR-CON) 20 MEQ CR tablet   Yes No    Take 20 mEq by mouth Daily.    spironolactone (ALDACTONE) 25 MG tablet   Yes No    Take 25 mg by mouth Daily.    tamsulosin (FLOMAX) 0.4 MG capsule 24 hr capsule   No No    Take 1 capsule by mouth Daily.             ED Medications:    Medications   atorvastatin (LIPITOR) tablet 40 mg (has no administration in time range)   carvedilol (COREG) tablet 25 mg (25 mg Oral Not Given 7/4/20 1759)   pantoprazole (PROTONIX) EC tablet 40 mg (40 mg Oral Given 7/4/20 1042)   amiodarone (PACERONE) tablet 200 mg (200 mg Oral Given 7/4/20 1041)   amLODIPine (NORVASC) tablet 5 mg (5 mg Oral Given 7/4/20 1042)   nitroglycerin (NITROSTAT) SL tablet 0.4 mg (has no administration in time range)   PARoxetine (PAXIL) tablet 5 mg (5 mg Oral Given 7/4/20 1041)   clopidogrel (PLAVIX) tablet 75 mg (75 mg Oral Given 7/4/20 1042)   apixaban (ELIQUIS) tablet 2.5 mg (2.5 mg Oral Given 7/4/20 1043)   polyethylene glycol (MIRALAX) packet 17 g (17 g Oral Given 7/4/20 1041)   tamsulosin (FLOMAX) 24 hr capsule 0.4 mg (0.4 mg Oral Given 7/4/20 1041)   bisacodyl (DULCOLAX) EC tablet 10 mg (has no administration in time range)   sodium chloride 0.9 % infusion (100 mL/hr Intravenous New Bag 7/4/20 0650)   acetaminophen (TYLENOL) tablet 650 mg (650 mg Oral Given 7/4/20 1631)   HYDROcodone-acetaminophen (NORCO) 5-325 MG per tablet 1 tablet (has no administration in time range)   Morphine sulfate (PF) injection 4 mg (has no administration in time range)       And   naloxone (NARCAN) injection 0.4 mg (has no administration in time range)   diphenhydrAMINE (BENADRYL) injection 25 mg (has no administration in time range)   ALPRAZolam (XANAX) tablet 0.25 mg (has no administration in time range)   temazepam (RESTORIL) capsule 15 mg (has no administration in time range)   ondansetron (ZOFRAN) tablet 4 mg ( Oral Not Given:  See Alt 7/4/20 1708)     Or   ondansetron (ZOFRAN) injection 4 mg (4 mg Intravenous Given 7/4/20 1708)   dextrose (GLUTOSE) oral gel 1 tube (has no administration in time range)   dextrose (D50W) 25 g/ 50mL Intravenous Solution 25 g (has no administration in time range)   glucagon (human recombinant) (GLUCAGEN DIAGNOSTIC) injection 1 mg (has no administration in time range)   insulin aspart (novoLOG) injection 0-7 Units (0 Units Subcutaneous Hold 7/4/20 1758)   cefTRIAXone (ROCEPHIN) IVPB 1 g/50ml dextrose (premix) (1 g Intravenous New Bag 7/4/20 1201)   doxycycline (VIBRAMYCIN) 100 mg in sodium chloride 0.9 % 250 mL IVPB (100 mg Intravenous New Bag 7/4/20 1255)   ipratropium-albuterol (DUO-NEB) nebulizer solution 3 mL (3 mL Nebulization Given 7/4/20 1838)   aspirin EC tablet 81 mg (81 mg Oral Not Given 7/4/20 1631)   sodium chloride 0.9 % infusion (30 mL/hr Intravenous New Bag 7/4/20 1759)   sodium chloride 0.9 % bolus 500 mL (500 mL Intravenous New Bag 7/4/20 1630)   sodium chloride 0.9 % bolus 1,000 mL (1,000 mL Intravenous New Bag 7/4/20 1655)       Vital Signs:    Temp:  [98.4 °F (36.9 °C)-100 °F (37.8 °C)] 98.4 °F (36.9 °C)  Heart Rate:  [60-78] 60  Resp:  [16-23] 17  BP: ()/(44-68) 101/48      Intake/Output Summary (Last 24 hours) at 7/4/2020 1905  Last data filed at 7/4/2020 1200  Gross per 24 hour   Intake 240 ml   Output 250 ml   Net -10 ml           07/04/20  0515   Weight: 79.5 kg (175 lb 3.2 oz)       Body mass index is 21.9 kg/m².    Physical Exam:      General Appearance:   Frail elderly man.  Alert and cooperative, no acute  distress, oriented x 3, talkative and  anxious   Head:    Atraumatic and normocephalic, without obvious defect.   Eyes:            PERRLA, conjunctivae and sclerae normal, no icterus. No pallor. Extraocular movements are within normal limits.   Ears:    Ears appear intact with no abnormalities noted.   Throat:   No oral lesions, no thrush, oral mucosa moist.   Neck:   Supple, trachea midline, no thyromegaly   Back:     No kyphoscoliosis present. No tenderness to palpation,   range of motion normal.   Lungs:     Chest shape is normal. Breath sounds heard bilaterally equally.  Trace left-sided crackles without wheezing. No Pleural rub or bronchial breathing.  Positive cough      Heart:    Normal S1 and S2, no murmur, no gallop, no rub. No JVD   Abdomen:     Normal bowel sounds, no masses, no organomegaly. Soft        non-tender, non-distended, no guarding, no rebound                tenderness   Extremities:   Moves all extremities well, no edema, no cyanosis, no             clubbing   Pulses:   Pulses palpable and equal bilaterally   Skin:  Trace right antecubital fossa IV previously bleeding and stopped, previous bleeding right forearm hematoma post pressure now with dry dressing and no active bleeding, bruising or rash; skin changes on legs consistent with chronic peripheral vascular disease   Lymph nodes:   No palpable adenopathy   Neurologic:  No tremor, sensation intact, Motor power is normal and equal bilaterally.  Answers questions appropriately.  Mild hearing deficit         Labs:    I have reviewed the labs done in the emergency room.  Results from last 7 days   Lab Units 07/04/20  0924 07/04/20  0658 07/04/20  0500   SODIUM mmol/L  --  134* 133*   POTASSIUM mmol/L 5.3* 5.4* 4.7   CHLORIDE mmol/L  --  99 102   CO2 mmol/L  --  24.7 21.9*   BUN mg/dL  --  38* 37*   CREATININE mg/dL  --  2.50* 2.16*   CALCIUM mg/dL  --  8.8 7.7*   BILIRUBIN mg/dL  --   --  0.8   ALK PHOS U/L  --   --  71   ALT (SGPT) U/L  --    --  38   AST (SGOT) U/L  --   --  34   GLUCOSE mg/dL  --  162* 165*     Results from last 7 days   Lab Units 07/04/20  1508 07/04/20  0658 07/04/20  0500   WBC 10*3/mm3  --  18.38* 16.41*   HEMOGLOBIN g/dL 12.9* 13.8 13.1   HEMATOCRIT % 40.0 42.0 40.1   PLATELETS 10*3/mm3  --  184 167     Results from last 7 days   Lab Units 07/04/20  1508 07/04/20  0500   INR  1.52* 1.72*     Results from last 7 days   Lab Units 07/04/20  0500   TROPONIN T ng/mL 0.025                           Invalid input(s): USDES,  BLOODU, NITRITITE, BACT, EP  Pain Management Panel     Pain Management Panel Latest Ref Rng & Units 7/4/2020    AMPHETAMINES SCREEN, URINE Negative Negative    BARBITURATES SCREEN Negative Negative    BENZODIAZEPINE SCREEN, URINE Negative Negative    BUPRENORPHINEUR Negative Negative    COCAINE SCREEN, URINE Negative Negative    METHADONE SCREEN, URINE Negative Negative    METHAMPHETAMINEUR Negative Negative              Radiology:    Imaging Results (Last 72 Hours)     Procedure Component Value Units Date/Time    US Renal Bilateral [[id number]] Collected:  07/04/20 1448     Updated:  07/04/20 1450    Narrative:       PROCEDURE: US RENAL BILATERAL-     HISTORY: STEPHANIE     PROCEDURE: Ultrasound images of the kidneys were obtained.     FINDINGS:.    The kidneys are normal in size and echogenicity with the right kidney  measuring 12.0 cm and the left kidney measuring 11.2 cm. Small cyst are  seen within the right kidney. There is no evidence of a solid renal mass  involving either kidney. There is no hydronephrosis.       Impression:       Right renal cyst with no evidence of renal obstruction.     This report was finalized on 7/4/2020 2:48 PM by Ana Pederson M.D..    XR Chest 1 View [[id number]] Collected:  07/04/20 1156     Updated:  07/04/20 1159    Narrative:       PROCEDURE: XR CHEST 1 VW-     HISTORY: dyspnea, fever     COMPARISON: 02/24/2019.     FINDINGS: A left subclavian ICD is in place. The patient is  status post  median sternotomy and CABG procedure. The heart is normal in size. The  mediastinum is unremarkable. There are coarse interstitial lung markings  with chronic blunting of the costophrenic angles. There is no  pneumothorax.  There are no acute osseous abnormalities.       Impression:       No change in the appearance of the chest.     Continued followup is recommended.     This report was finalized on 7/4/2020 11:57 AM by Ana Pederson M.D..          Assessment:    STEMI involving left circumflex coronary artery (CMS/HCC)    Acute embolic Left MCA CVA at admission. INR subtherapeutic at 1.1. Successful thrombectomy 6/1/17    Diabetes mellitus (CMS/HCC)    Ischemic cardiomyopathy    Hyperlipidemia    Hypertension    H/O NSVT status post PPM/ICD    Coronary artery disease with CABG in 2000, and 2016. Stent placed February 2017    History of PAF    ST elevation myocardial infarction (STEMI) (CMS/HCC)    Diabetes mellitus type 2, controlled  STEMI post cardiac cath post stent  Ischemic cardiomyopathy with reduced LV EF  Acute bronchitis, viral, with Human Rhinovirus, prior to admission, with concern of early developing bacterial pneumonia  Hypotension, likely volume depletion, complicated by cardiac medications required and for hypertension; doubt sepsis but unable to clarify further  Lactic acidemia, likely due to chronic lung disease  Right arm hematoma at iv site, stopped    Plan:    The Hospitalist service will assist in consultation and medical management of diabetes, with acute bronchitis. Respiratory panel tested and confirmed Human Rhinovirus.  Nephrology is consulted for renal failure. With hypotension, I added fluid bolus. Dr Sue aware and stopped lisinopril and added additional fluid bolus.   Continue to monitor for bleeding. With iv site bleeding, aspirin 325mg held but could consider reducing to 81mg, along with plavix and eliquis. Dr Sue was called for this already.   Stat H and  H confirmed hemoglobin >12. No acute bleeding is identified. I saw patient multiple times today to assist with care.  For possible bronchitis/pneumonia, ceftriaxone and doxycycline were initiated. He was provided Neb treatments. COVID screen not performed in Cath lab, later ordered on my consult, was negative.  Medication risks and benefits were discussed in detail. Patient reported being satisfied with current treatment plan.    Advance Care Planning   ACP discussion was held with the patient during this visit. Patient does not have an advance directive, declines further assistance. He desires to remain Full code. Wife came later and happy with care.    Angie Briceño,   07/04/20  19:05

## 2020-07-04 NOTE — H&P (VIEW-ONLY)
Patient Care Team:  Mady Coy MD as PCP - General (Internal Medicine)  Shar Obregon MD as PCP - [facility]  Shorty Guido MD as Consulting Physician (General Surgery)    Chief complaint : Shortness of breath    Subjective     Patient is a 80 y.o. male patient who called 911 after experiencing progressively worsening shortness of breath.  Paramedics performed a twelve-lead electrocardiogram which demonstrated normal sinus rhythm with a right bundle branch block.  There was no R waves across the mid and left precordium with ST segment elevation in leads V6, I and aVL.  There was no reciprocal ST segment depression.  The patient was a poor historian.  The patient reports a prior heart attack.  He reports having prior bypass surgery.  He indicates that he had a heart catheterization procedure 3 weeks ago but cannot recall where the procedure was performed or the name of his treating cardiologist.  After the procedure review of his medical records indicates that the patient originally had bypass surgery in 2000 with a LIMA to the LAD.  He underwent repeat coronary artery bypass surgery in 2016 with a CHARLES to the PDA, sequential reverse saphenous vein graft to the first and second obtuse marginal branches and a vein graft to the diagonal branch.  In 2017 the patient had another acute coronary syndrome with ventricular tachycardia.  The patient at that time had stenting of the sequential vein graft to the OM1 and OM 2.  The patient also had a dual-chamber biventricular ICD placed.  He has a history of paroxysmal atrial fibrillation.  He has a history of a prior embolic stroke.  He has a history of hypertension, type 2 diabetes mellitus and dyslipidemia.  He is a non-smoker.  The patient's most recent catheterization was demonstrated to show diffuse disease with no further revascularization options.  He was told at that time he would require medical therapy only.  His last known left  ventricular ejection fraction from 2017 was 45%.  Review of Systems   Pertinent items are noted in HPI    History  Past Medical History:   Diagnosis Date   • Anxiety    • Arnold-Chiari malformation, type I (CMS/HCC)     followed by Dr. Martinez, but last note available was 2014   • BPH (benign prostatic hyperplasia)    • Chronic Pleural effusion on right    • Complex partial seizure (CMS/[facility])     followed by Dr. Martinez, but last note available was 2014   • Congestive heart failure (CMS/[facility])     follows with Dr. Pompa at [facility], EF 30%   • Coronary artery disease     on ASA/plavix   • CVA (cerebral vascular accident) (CMS/[facility])     apical thrombus on chronic coumadin   • Diabetes mellitus (CMS/[facility])    • ED (erectile dysfunction)    • GERD (gastroesophageal reflux disease)    • Hyperlipidemia    • Hypertension    • Ischemic cardiomyopathy    • Myocardial infarction (CMS/[facility])    • Non-sustained ventricular tachycardia (CMS/HCC)    • Renal disorder      Past Surgical History:   Procedure Laterality Date   • CARDIAC CATHETERIZATION  02/2016    [facility], 3 vessel disease, patent LIMA to LAD bypass graft    • CARDIAC CATHETERIZATION  05/25/2017    [facility], severe 3 vessel disease, patent LIMA to LAD bypass graft, recommend medical management    • CARDIAC DEFIBRILLATOR PLACEMENT  08/2009    w/ PPM Medtronic (DDD)   • COLONOSCOPY W/ BIOPSIES     • CORONARY ANGIOPLASTY WITH STENT PLACEMENT     • CORONARY ARTERY BYPASS GRAFT  2000    [facility]   • INSERT / REPLACE / REMOVE PACEMAKER  05/26/2017    Medtronic generator change, Dr. Ho, [facility]    • INTERVENTIONAL RADIOLOGY PROCEDURE Bilateral 6/1/2017    Procedure: Carotid Cerebral Angiogram;  Surgeon: Wil Rodrigez MD;  Location: [facility] INVASIVE LOCATION;  Service:      Family History   Problem Relation Age of Onset   • Diabetes Mother    • Hypertension Mother    • Heart disease Mother    • Heart disease Father    • Stroke Father    • Heart disease Sister    • Diabetes Other    • Heart  disease Other    • Hypertension Other    • Stroke Other      Social History     Tobacco Use   • Smoking status: Never Smoker   • Smokeless tobacco: Never Used   Substance Use Topics   • Alcohol use: No   • Drug use: No     Medications Prior to Admission   Medication Sig Dispense Refill Last Dose   • amiodarone (PACERONE) 200 MG tablet Take 200 mg by mouth Daily.   Taking   • amLODIPine (NORVASC) 10 MG tablet Take 0.5 tablets by mouth Daily.   Taking   • apixaban (ELIQUIS) 2.5 MG tablet tablet Take 2.5 mg by mouth 2 (Two) Times a Day.   Taking   • aspirin 325 MG tablet Take 1 tablet by mouth Daily. (Patient taking differently: Take 81 mg by mouth Daily.) 30 tablet 3 Not Taking   • atorvastatin (LIPITOR) 40 MG tablet Take 40 mg by mouth Every Night.   Taking   • bisacodyl (DULCOLAX) 5 MG EC tablet Clear liquid diet day prior to colonoscopy. 2 at 3pm and 2 at 7pm. 4 tablet 0    • carvedilol (COREG) 6.25 MG tablet Take 25 mg by mouth 2 (Two) Times a Day.   Taking   • clopidogrel (PLAVIX) 75 MG tablet Take 75 mg by mouth 2 (Two) Times a Day.   Taking   • furosemide (LASIX) 40 MG tablet Take 40 mg by mouth Daily.   Taking   • lisinopril (PRINIVIL,ZESTRIL) 20 MG tablet Take 30 mg by mouth 2 (Two) Times a Day.   Not Taking   • nitroglycerin (NITROSTAT) 0.4 MG SL tablet Place 0.4 mg under the tongue Every 5 (Five) Minutes As Needed for Chest Pain. Take no more than 3 doses in 15 minutes.   Taking   • pantoprazole (PROTONIX) 40 MG EC tablet Take 40 mg by mouth Daily.   Not Taking   • PARoxetine (PAXIL) 10 MG tablet Take 5 mg by mouth Every Morning.   Not Taking   • polyethylene glycol (MIRALAX) packet Take 17 g by mouth Daily.   Taking   • potassium chloride (KLOR-CON) 20 MEQ CR tablet Take 20 mEq by mouth Daily.   Taking   • spironolactone (ALDACTONE) 25 MG tablet Take 25 mg by mouth Daily.   Taking   • tamsulosin (FLOMAX) 0.4 MG capsule 24 hr capsule Take 1 capsule by mouth Daily. 30 capsule 11      Allergies:   Latex    Objective     Vital Signs  Heart Rate:  [60-69] 60  Resp:  [18] 18  BP: (104-115)/(57-59) 115/57    Physical Exam:      General Appearance:    Alert, cooperative, in no acute distress   Head:    Normocephalic, without obvious abnormality, atraumatic   Eyes:            Lids and lashes normal, conjunctivae and sclerae normal, no   icterus, no pallor, corneas clear, PERRLA   Ears:    Ears appear intact with no abnormalities noted   Throat:   No oral lesions, no thrush, oral mucosa moist   Neck:   No adenopathy, supple, trachea midline, no thyromegaly, no   carotid bruit, no JVD   Back:     No kyphosis present, no scoliosis present, no skin lesions,      erythema or scars, no tenderness to percussion or                   palpation,   range of motion normal   Lungs:     Clear to auscultation,respirations regular, even and                  unlabored    Heart:    Regular rhythm and normal rate, normal S1 and S2, no            murmur, no gallop, no rub, no click   Chest Wall:    No abnormalities observed   Abdomen:     Normal bowel sounds, no masses, no organomegaly, soft        non-tender, non-distended, no guarding, no rebound                tenderness   Rectal:     Deferred   Extremities:   Moves all extremities well, no edema, no cyanosis, no             redness   Pulses:   Pulses palpable and equal bilaterally   Skin:   No bleeding, bruising or rash   Lymph nodes:   No palpable adenopathy   Neurologic:   Cranial nerves 2 - 12 grossly intact, sensation intact, DTR       present and equal bilaterally     Results Review:    I reviewed the patient's new clinical results.    Assessment/Plan       STEMI involving left circumflex coronary artery (CMS/HCC)    Acute embolic Left MCA CVA at admission. INR subtherapeutic at 1.1. Successful thrombectomy 6/1/17    Diabetes mellitus (CMS/HCC)    Ischemic cardiomyopathy    Hyperlipidemia    Hypertension    H/O NSVT status post PPM/ICD    Coronary artery disease with CABG in  2000, and 2016. Stent placed February 2017    History of PAF    ST elevation myocardial infarction (STEMI) (CMS/[facility])      The patient's twelve-lead electrocardiogram is atypical for an ST elevation myocardial infarction.  There are no old EKGs for comparison.  The ECG is suspicious for LV aneurysm with suspected persistent lateral and high lateral ST elevation.  A face-to-face patient level discussion was engaged discussing the rationale behind proceeding with invasive testing/procedure.  The procedure was explained in detail to the patient including risks benefits and alternatives.  Further recommendations will be predicated on the results of his catheterization findings.    I discussed the patients findings and my recommendations with patient.  No family members were available at the time of patient presentation.    Vasyl Sue MD  07/04/20  05:05

## 2020-07-05 LAB
ANION GAP SERPL CALCULATED.3IONS-SCNC: 8.4 MMOL/L (ref 5–15)
BUN SERPL-MCNC: 43 MG/DL (ref 8–23)
BUN/CREAT SERPL: 15.2 (ref 7–25)
CALCIUM SPEC-SCNC: 8.5 MG/DL (ref 8.6–10.5)
CHLORIDE SERPL-SCNC: 107 MMOL/L (ref 98–107)
CO2 SERPL-SCNC: 23.6 MMOL/L (ref 22–29)
CREAT SERPL-MCNC: 2.83 MG/DL (ref 0.76–1.27)
GFR SERPL CREATININE-BSD FRML MDRD: 22 ML/MIN/1.73
GLUCOSE BLDC GLUCOMTR-MCNC: 113 MG/DL (ref 70–130)
GLUCOSE BLDC GLUCOMTR-MCNC: 125 MG/DL (ref 70–130)
GLUCOSE BLDC GLUCOMTR-MCNC: 164 MG/DL (ref 70–130)
GLUCOSE BLDC GLUCOMTR-MCNC: 82 MG/DL (ref 70–130)
GLUCOSE SERPL-MCNC: 105 MG/DL (ref 65–99)
HCT VFR BLD AUTO: 36.7 % (ref 37.5–51)
HGB BLD-MCNC: 11.7 G/DL (ref 13–17.7)
POTASSIUM SERPL-SCNC: 4.7 MMOL/L (ref 3.5–5.2)
SODIUM SERPL-SCNC: 139 MMOL/L (ref 136–145)

## 2020-07-05 PROCEDURE — 99231 SBSQ HOSP IP/OBS SF/LOW 25: CPT | Performed by: INTERNAL MEDICINE

## 2020-07-05 PROCEDURE — 82962 GLUCOSE BLOOD TEST: CPT

## 2020-07-05 PROCEDURE — 80048 BASIC METABOLIC PNL TOTAL CA: CPT | Performed by: INTERNAL MEDICINE

## 2020-07-05 PROCEDURE — 99231 SBSQ HOSP IP/OBS SF/LOW 25: CPT | Performed by: FAMILY MEDICINE

## 2020-07-05 PROCEDURE — 85018 HEMOGLOBIN: CPT | Performed by: FAMILY MEDICINE

## 2020-07-05 PROCEDURE — 94799 UNLISTED PULMONARY SVC/PX: CPT

## 2020-07-05 PROCEDURE — 93005 ELECTROCARDIOGRAM TRACING: CPT | Performed by: INTERNAL MEDICINE

## 2020-07-05 PROCEDURE — 85014 HEMATOCRIT: CPT | Performed by: FAMILY MEDICINE

## 2020-07-05 RX ORDER — CARVEDILOL 3.12 MG/1
3.12 TABLET ORAL 2 TIMES DAILY WITH MEALS
Status: DISCONTINUED | OUTPATIENT
Start: 2020-07-05 | End: 2020-07-06 | Stop reason: HOSPADM

## 2020-07-05 RX ORDER — ATORVASTATIN CALCIUM 80 MG/1
80 TABLET, FILM COATED ORAL NIGHTLY
Status: DISCONTINUED | OUTPATIENT
Start: 2020-07-05 | End: 2020-07-06 | Stop reason: HOSPADM

## 2020-07-05 RX ADMIN — CARVEDILOL 3.12 MG: 3.12 TABLET, FILM COATED ORAL at 17:57

## 2020-07-05 RX ADMIN — CLOPIDOGREL BISULFATE 75 MG: 75 TABLET ORAL at 09:07

## 2020-07-05 RX ADMIN — PAROXETINE HYDROCHLORIDE 5 MG: 10 TABLET, FILM COATED ORAL at 09:08

## 2020-07-05 RX ADMIN — IPRATROPIUM BROMIDE AND ALBUTEROL SULFATE 3 ML: .5; 3 SOLUTION RESPIRATORY (INHALATION) at 07:29

## 2020-07-05 RX ADMIN — ASPIRIN 81 MG: 81 TABLET, COATED ORAL at 09:07

## 2020-07-05 RX ADMIN — PANTOPRAZOLE SODIUM 40 MG: 40 TABLET, DELAYED RELEASE ORAL at 09:07

## 2020-07-05 RX ADMIN — IPRATROPIUM BROMIDE AND ALBUTEROL SULFATE 3 ML: .5; 3 SOLUTION RESPIRATORY (INHALATION) at 20:40

## 2020-07-05 RX ADMIN — IPRATROPIUM BROMIDE AND ALBUTEROL SULFATE 3 ML: .5; 3 SOLUTION RESPIRATORY (INHALATION) at 12:22

## 2020-07-05 RX ADMIN — ATORVASTATIN CALCIUM 80 MG: 80 TABLET, FILM COATED ORAL at 20:13

## 2020-07-05 RX ADMIN — DOXYCYCLINE 100 MG: 100 INJECTION, POWDER, LYOPHILIZED, FOR SOLUTION INTRAVENOUS at 23:26

## 2020-07-05 RX ADMIN — DOXYCYCLINE 100 MG: 100 INJECTION, POWDER, LYOPHILIZED, FOR SOLUTION INTRAVENOUS at 11:48

## 2020-07-05 RX ADMIN — POLYETHYLENE GLYCOL 3350 17 G: 17 POWDER, FOR SOLUTION ORAL at 09:07

## 2020-07-05 RX ADMIN — TAMSULOSIN HYDROCHLORIDE 0.4 MG: 0.4 CAPSULE ORAL at 09:07

## 2020-07-05 RX ADMIN — IPRATROPIUM BROMIDE AND ALBUTEROL SULFATE 3 ML: .5; 3 SOLUTION RESPIRATORY (INHALATION) at 00:48

## 2020-07-05 RX ADMIN — AMIODARONE HYDROCHLORIDE 200 MG: 200 TABLET ORAL at 09:08

## 2020-07-05 NOTE — PROGRESS NOTES
[facility]    [facility]    Name:  Pancho Bonner   Age:  80 y.o.  Sex:  male  :  1940  MRN:  [id number]   Visit Number:  [id number]  Admission Date:  2020  Date Of Service:  20  Primary Care Physician:  Mady Coy MD     LOS: 1 day :      Chief Complaint: Follow-up STEMI with diabetes and hypertension and bronchitis        Subjective / Interval History: The patient was seen again this morning.  He did have some mild dizziness with systolic of 90 with ambulation and morning blood pressure medications held.  He denies chest pain, shortness of breath, abdominal pain.  He does have a cough with some yellow sputum.  He has been treated for bronchitis.  He is overall feeling better.  No acute events occurred overnight.  It looks like the bleeding that he had from his IV sites yesterday has since stopped.  Continue medications per Dr. mitchell.  The patient's diabetes chronically is controlled.  His glucose here has remained controlled.      Review of Systems:     General ROS: Patient denies any fevers, chills or loss of consciousness.  Mild dizziness with ambulation  Ophthalmic ROS: Denies any diplopia or transient loss of vision.  ENT ROS: Denies sore throat, nasal congestion or ear pain.   Respiratory ROS: Positive cough without shortness of breath.  Cardiovascular ROS: Denies chest pain or palpitations. No history of exertional chest pain.   Gastrointestinal ROS: Denies nausea and vomiting. Denies any abdominal pain. No diarrhea.  Genito-Urinary ROS: Denies dysuria or hematuria.  Musculoskeletal ROS: Denies back pain. No muscle pain. No calf pain.  Neurological ROS: Denies any focal weakness. No loss of consciousness. Denies any numbness. Denies neck pain.   Dermatological ROS: Denies any redness or pruritis.    Vital Signs:    Temp:  [97.9 °F (36.6 °C)-98.6 °F (37 °C)] 98.2 °F (36.8 °C)  Heart Rate:  [58-94] 61  Resp:  [12-20] 18  BP: ()/(41-84)  92/50    Intake and output:    I/O last 3 completed shifts:  In: 4393 [P.O.:840; I.V.:3553]  Out: 1300 [Urine:1300]  No intake/output data recorded.    Physical Examination:    General Appearance:   Chronically ill elderly man.  Alert and cooperative, not in any acute distress.   Head:    Atraumatic and normocephalic, without obvious abnormality.   Eyes:            PERRLA, conjunctivae and sclerae normal, no Icterus. No pallor. Extraocular movements are within normal limits.   Throat:   No oral lesions, no thrush, oral mucosa moist.   Neck:   Supple, trachea midline, no thyromegaly, no carotid bruit.   Lungs:     Chest shape is normal. Breath sounds heard bilaterally equally.  No crackles or wheezing. No Pleural rub or bronchial breathing.    Heart:    Normal S1 and S2, no murmur, no gallop, no rub. No JVD   Abdomen:     Normal bowel sounds, no masses, no organomegaly. Soft     nontender, nondistended, no guarding, no rebound                tenderness   Extremities:   Moves all extremities well, no edema, no cyanosis, no           clubbing   Skin:   No bleeding, bruising or rash.   Neurologic:   Cranial nerves 2 - 12 grossly intact, sensation intact, Motor power is normal and equal bilaterally.   Laboratory results:    Results from last 7 days   Lab Units 07/05/20 0427 07/04/20  0924 07/04/20  0658 07/04/20  0500   SODIUM mmol/L 139  --  134* 133*   POTASSIUM mmol/L 4.7 5.3* 5.4* 4.7   CHLORIDE mmol/L 107  --  99 102   CO2 mmol/L 23.6  --  24.7 21.9*   BUN mg/dL 43*  --  38* 37*   CREATININE mg/dL 2.83*  --  2.50* 2.16*   CALCIUM mg/dL 8.5*  --  8.8 7.7*   BILIRUBIN mg/dL  --   --   --  0.8   ALK PHOS U/L  --   --   --  71   ALT (SGPT) U/L  --   --   --  38   AST (SGOT) U/L  --   --   --  34   GLUCOSE mg/dL 105*  --  162* 165*     Results from last 7 days   Lab Units 07/05/20  0428 07/04/20 2020 07/04/20  1508 07/04/20  0658 07/04/20  0500   WBC 10*3/mm3  --   --   --  18.38* 16.41*   HEMOGLOBIN g/dL 11.7* 12.1*  12.9* 13.8 13.1   HEMATOCRIT % 36.7* 37.4* 40.0 42.0 40.1   PLATELETS 10*3/mm3  --   --   --  184 167     Results from last 7 days   Lab Units 07/04/20  1508 07/04/20  0500   INR  1.52* 1.72*     Results from last 7 days   Lab Units 07/04/20  0500   TROPONIN T ng/mL 0.025     Results from last 7 days   Lab Units 07/04/20  1045 07/04/20  1030   BLOODCX  No growth at 24 hours No growth at 24 hours           I have reviewed the patient's laboratory results.    Radiology results:    Imaging Results (Last 24 Hours)     Procedure Component Value Units Date/Time    US Renal Bilateral [[id number]] Collected:  07/04/20 1448     Updated:  07/04/20 1450    Narrative:       PROCEDURE: US RENAL BILATERAL-     HISTORY: STEPHANIE     PROCEDURE: Ultrasound images of the kidneys were obtained.     FINDINGS:.    The kidneys are normal in size and echogenicity with the right kidney  measuring 12.0 cm and the left kidney measuring 11.2 cm. Small cyst are  seen within the right kidney. There is no evidence of a solid renal mass  involving either kidney. There is no hydronephrosis.       Impression:       Right renal cyst with no evidence of renal obstruction.     This report was finalized on 7/4/2020 2:48 PM by Ana Pederson M.D..          I have reviewed the patient's radiology reports.    Medication Review:     I have reviewed the patients active and prn medications.     Assessment:  STEMI post cardiac cath post stent  Diabetes mellitus type 2, controlled  Acute blood loss anemia, post multiple iv sites bleeding, Right arm hematoma at iv site, stopped; hemoglobin stable not requiring transfusion; no further bleeding seen  Ischemic cardiomyopathy with reduced LV EF  Acute bronchitis, viral, with Human Rhinovirus, prior to admission, with concern of early developing bacterial bronchis acute on chronic likely; cannot specify further  Hypotension, likely volume depletion, complicated by cardiac medications required and for hypertension;  doubt sepsis   Lactic acidemia, likely due to chronic lung disease    Plan:  Continue doxycycline today along with neb treatments. BP low but stable, holding morning BP medications. Dr Sue is aware. He will stay until tomorrow.   Hospitalist service will follow as needed.  Medication risks and benefits were discussed in detail. Patient reported satisfaction with care delivered and treatment plan.     Angie Briceño DO  07/05/20  12:24

## 2020-07-05 NOTE — PLAN OF CARE
Pt started oozing from IV site and blisters saturating his dressing, in the afternoon pt started swelling at groin site and became hard and painful. 30 minutes of manual pressure was applied and a femstop was initiated.Labs drawn and MD Sue and Navarro notified.   Aspirin was held. Pt dropped BP to 65/44 1.5LNS given to stabilize him. Pt can receive another 1LNS bolus if needed per MD Sue and get a CT of Abdomen. Pelvis without contrast if BP remains low after all boluses. Will recheck H&H this evening. Pt will remain on strict bedrest. Distal pulses remain dopplerable at this time and pt SBP 95 to 105.     Problem: Patient Care Overview  Goal: Plan of Care Review  Outcome: Ongoing (interventions implemented as appropriate)  Goal: Individualization and Mutuality  Outcome: Ongoing (interventions implemented as appropriate)  Goal: Discharge Needs Assessment  Outcome: Ongoing (interventions implemented as appropriate)  Goal: Interprofessional Rounds/Family Conf  Outcome: Ongoing (interventions implemented as appropriate)     Problem: Fall Risk (Adult)  Goal: Identify Related Risk Factors and Signs and Symptoms  Outcome: Ongoing (interventions implemented as appropriate)  Goal: Absence of Fall  Outcome: Ongoing (interventions implemented as appropriate)     Problem: Pain, Chronic (Adult)  Goal: Identify Related Risk Factors and Signs and Symptoms  Outcome: Ongoing (interventions implemented as appropriate)  Goal: Acceptable Pain/Comfort Level and Functional Ability  Outcome: Ongoing (interventions implemented as appropriate)     Problem: Diabetes, Type 2 (Adult)  Goal: Signs and Symptoms of Listed Potential Problems Will be Absent, Minimized or Managed (Diabetes, Type 2)  Outcome: Ongoing (interventions implemented as appropriate)

## 2020-07-05 NOTE — PROGRESS NOTES
[facility]-Cardiology Progress note     LOS: 1 day   Patient Care Team:  Mady Coy MD as PCP - General (Internal Medicine)  Shar Obregon MD as PCP - Claims Attributed  Shorty Guido MD as Consulting Physician (General Surgery)    Chief Complaint: Shortness of breath    Subjective:    Interval History: The patient developed bleeding from multiple venous as well as his arterial access site yesterday.  Manual pressure was held on his femoral artery for 30 minutes and a FemoStop device was in place for nonocclusive pressure for 4 hours.  This morning.  There is no groin hematoma or significant bruising.  Femoral and pedal pulses are easily palpable.  The patient's hemoglobin decreased slightly.  His blood pressure has been low and his morning medications were held.  He has had no further chest discomfort or shortness of breath.  He is upright in a bedside chair with no supplemental oxygen requirement.  His kidney function has slightly worsened with serum creatinine increasing from 2.5 mg/dL to 2.85 mg/dL.  He has had no recurrent atrial fibrillation on the cardiac monitor.  We have confirmed with the patient's family that he was in fact taking warfarin therapy in combination with Eliquis 2.5 mg orally twice per day in combination with low-dose aspirin therapy as well as Plavix 75 mg/day.  The patient and his family have been educated that this is an extremely dangerous combination in a patient and his ninth decade of life with chronic renal failure.      Patient Complaints: none  Patient Denies:   Chest pain, shortness of breath, orthopnea, peripheral edema, palpitations, cough, sputum production, hemoptysis, abdominal pain, nausea, vomiting, diarrhea, fevers chills or night sweats  History taken from: patient    Review of Systems:   All systems were reviewed and negative       Objective:    Vital Sign Min/Max for last 24 hours  Temp  Min: 97.9 °F (36.6 °C)  Max: 99.2 °F (37.3 °C)   BP  Min: 63/44  Max:  "115/56   Pulse  Min: 58  Max: 94   Resp  Min: 12  Max: 20   SpO2  Min: 94 %  Max: 99 %   Flow (L/min)  Min: 2  Max: 4   Weight  Min: 83.9 kg (184 lb 14.4 oz)  Max: 83.9 kg (184 lb 14.4 oz)     Flowsheet Rows      First Filed Value   Admission Height  190.5 cm (75\") Documented at 07/04/2020 0515   Admission Weight  79.5 kg (175 lb 3.2 oz) Documented at 07/04/2020 0515          Physical Exam:     General Appearance:    Alert, cooperative, in no acute distress   Head:    Normocephalic, without obvious abnormality, atraumatic   Eyes:            Lids and lashes normal, conjunctivae and sclerae normal, no   icterus, no pallor, corneas clear, PERRLA   Ears:    Ears appear intact with no abnormalities noted   Throat:   No oral lesions, no thrush, oral mucosa moist   Neck:   No adenopathy, supple, trachea midline, no thyromegaly, no   carotid bruit, no JVD   Back:     No kyphosis present, no scoliosis present, no skin lesions,      erythema or scars, no tenderness to percussion or                   palpation,   range of motion normal   Lungs:     Clear to auscultation,respirations regular, even and                  unlabored    Heart:    Regular rhythm and normal rate, normal S1 and S2, no            murmur, no gallop, no rub, no click   Chest Wall:    No abnormalities observed   Abdomen:     Normal bowel sounds, no masses, no organomegaly, soft        non-tender, non-distended, no guarding, no rebound                tenderness   Rectal:     Deferred   Extremities:   Moves all extremities well, no edema, no cyanosis, no             redness   Pulses:   Pulses palpable and equal bilaterally   Skin:   No bleeding, bruising or rash   Lymph nodes:   No palpable adenopathy   Neurologic:   Cranial nerves 2 - 12 grossly intact, sensation intact, DTR       present and equal bilaterally        Results Review:     I reviewed the patient's new clinical results.  Results from last 7 days   Lab Units 07/04/20  0658   HEMOGLOBIN A1C % " 6.20*     Results from last 7 days   Lab Units 07/05/20  0427   SODIUM mmol/L 139   POTASSIUM mmol/L 4.7   CHLORIDE mmol/L 107   CO2 mmol/L 23.6   BUN mg/dL 43*   CREATININE mg/dL 2.83*   GLUCOSE mg/dL 105*   CALCIUM mg/dL 8.5*     Results from last 7 days   Lab Units 07/05/20  0428 07/04/20 2020 07/04/20  1508 07/04/20  0658 07/04/20  0500   WBC 10*3/mm3  --   --   --  18.38* 16.41*   HEMOGLOBIN g/dL 11.7* 12.1* 12.9* 13.8 13.1   HEMATOCRIT % 36.7* 37.4* 40.0 42.0 40.1   PLATELETS 10*3/mm3  --   --   --  184 167         Echo EF Estimated  Lab Results   Component Value Date    ECHOEFEST 50 06/01/2017   Left ventricular ejection fraction by contrast ventriculogram: 25% with massive anterior-apical left ventricular aneurysm formation    Physical Exam    Medication Review: yes    Assessment/Plan:      STEMI involving left circumflex coronary artery (CMS/HCC)-this diagnosis was excluded at cardiac catheterization.  His lateral ST elevation is chronic and due to massive left ventricular aneurysm formation.    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 INR subtherapeutic at 1.1. Successful thrombectomy 6/1/17    Diabetes mellitus (CMS/[facility])    Ischemic cardiomyopathy    Hyperlipidemia    Hypertension    H/O NSVT status post PPM/ICD    Coronary artery disease with CABG in 2000, and 2016. Stent placed February 2017.  Coronary artery disease.  Native heart.  Native vessels.  Presentation with acute coronary syndrome.  Currently angina free.  Status post extremely complicated angioplasty and stenting to the ostial, proximal and mid RCA.  All other vascular territories are bypass graft protected.    History of PAF    ST elevation myocardial infarction (STEMI) (CMS/HCC)      The patient developed bleeding from multiple venous as well as his arterial access site yesterday.  Manual pressure was held on his femoral artery for 30 minutes and a FemoStop device was in place for nonocclusive pressure for 4 hours.  This morning.  There  is no groin hematoma or significant bruising.  Femoral and pedal pulses are easily palpable.  The patient's hemoglobin decreased slightly.  His blood pressure has been low and his morning medications were held.    Plan for disposition:Where: home and When:  tomorrow    Vasyl Sue MD  07/05/20  11:13

## 2020-07-05 NOTE — PLAN OF CARE
Transferred to floor from ICU this afternoon.  No reports of pain.  Paced rhythm on telemetry.  Eating and drinking well.  Vitals unremarkable.  No acute events this shift.

## 2020-07-05 NOTE — PLAN OF CARE
Problem: Pain, Chronic (Adult)  Goal: Acceptable Pain/Comfort Level and Functional Ability  Outcome: Ongoing (interventions implemented as appropriate)  Flowsheets (Taken 7/5/2020 0423)  Acceptable Pain/Comfort Level and Functional Ability: making progress toward outcome     Problem: Patient Care Overview  Goal: Plan of Care Review  Flowsheets  Taken 7/5/2020 9253  Progress: improving  Taken 7/4/2020 2000  Plan of Care Reviewed With: patient

## 2020-07-05 NOTE — PROGRESS NOTES
LOS: 1 day   Patient Care Team:  Mady Coy MD as PCP - General (Internal Medicine)  Shar Obregon MD as PCP - Claims Attributed  Shorty Guido MD as Consulting Physician (General Surgery)    Chief Complaint:  Renal issues    Subjective     Interval History:     Patient Complaints: Chart reviewed.   Note dizziness earlier, BP meds held  No urine complaints  No CP  Breathing Ok  No swelling      amiodarone 200 mg Oral Daily   aspirin 81 mg Oral Daily   atorvastatin 80 mg Oral Nightly   carvedilol 3.125 mg Oral BID With Meals   clopidogrel 75 mg Oral Daily   doxycycline (VIBRAMYCIN) IVPB 100 mg Intravenous Q12H   insulin aspart 0-7 Units Subcutaneous TID AC   ipratropium-albuterol 3 mL Nebulization Q6H - RT   pantoprazole 40 mg Oral Daily   PARoxetine 5 mg Oral QAM   polyethylene glycol 1 packet Oral Daily   tamsulosin 0.4 mg Oral Daily          Review of Systems:   General : No pain, no chills  HEENT: No headache, no nosebleed, no sore throat, no blurry vision  Chest : no chest pain, no palpitations  Respiratory: No cough, no SOA, no hemoptysis  GI:  No N/V/D, no abdominal pain  Skin : no rashes, no pruritus  Musculoskeletal : no flank pain, no joint effusions  Neuro : + weakness, no numbness  Endocrine: no heat/cold intolerance, no weight loss  Genitourinary: no dysuria or hematuria    Objective     Vital Signs  Temp:  [97.7 °F (36.5 °C)-98.6 °F (37 °C)] 97.7 °F (36.5 °C)  Heart Rate:  [58-94] 68  Resp:  [12-20] 18  BP: ()/(41-84) 105/55    Intake/Output Summary (Last 24 hours) at 7/5/2020 1431  Last data filed at 7/5/2020 0621  Gross per 24 hour   Intake 4153 ml   Output 400 ml   Net 3753 ml           Physical Exam:     General Appearance:    Alert,  in no acute distress   Head:    Normocephalic, without obvious abnormality, atraumatic   Ears:    Ears appear intact , no drainage   Throat:    no thrush, oral mucosa moist   Neck:   No adenopathy, supple   Back:     No CVA tenderness to  palpation   Lungs:     respirations unlabored, no wheezes , + few rhonchi    Heart:    Regular rate  , normal S1 and S2, no  murmur, no gallop, no rub   Abdomen:     Normal bowel sounds, no masses, soft non-tender   Extremities:    No lower extremity edema, no cyanosis   Skin:   Dry, No rash  Musc no CVA TTP  Neuro CN intact                Results Review:    Results from last 7 days   Lab Units 07/05/20  0427 07/04/20  0924 07/04/20  0658 07/04/20  0500   SODIUM mmol/L 139  --  134* 133*   POTASSIUM mmol/L 4.7 5.3* 5.4* 4.7   CHLORIDE mmol/L 107  --  99 102   CO2 mmol/L 23.6  --  24.7 21.9*   BUN mg/dL 43*  --  38* 37*   CREATININE mg/dL 2.83*  --  2.50* 2.16*   GLUCOSE mg/dL 105*  --  162* 165*   CALCIUM mg/dL 8.5*  --  8.8 7.7*     Results from last 7 days   Lab Units 07/05/20  0428 07/04/20  2020 07/04/20  1508 07/04/20  0658 07/04/20  0500   WBC 10*3/mm3  --   --   --  18.38* 16.41*   HEMOGLOBIN g/dL 11.7* 12.1* 12.9* 13.8 13.1   HEMATOCRIT % 36.7* 37.4* 40.0 42.0 40.1   PLATELETS 10*3/mm3  --   --   --  184 167           Assessment/Plan       STEMI involving left circumflex coronary artery (CMS/[facility])    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 INR subtherapeutic at 1.1. Successful thrombectomy 6/1/17    Diabetes mellitus (CMS/[facility])    Ischemic cardiomyopathy    Hyperlipidemia    Hypertension    H/O NSVT status post PPM/ICD    Coronary artery disease with CABG in 2000, and 2016. Stent placed February 2017    History of PAF    ST elevation myocardial infarction (STEMI) (CMS/[facility])        1. STEPHANIE on CKD 3 ( 1.4 creatinine here last year, ? More recent levels)--- underlying vascular/HTN disease.   UA and U\S both OK     Had emergent cath and stents, did get some IVF's and I added Mucomyst  Looks to have contrast injury, monitor  Hold ACE\Aldactone\lasix for now     2. Hyperkalemia- stop KCL and ACE\Aldactone--better     3. CHF - AICD-- monitor for need to resume diuretics     4. CAD S\P stents     5. HTN-  Avoid low  BP's     6. Advanced age/frailty -  Prognosis guarded     Plan:     Am labs     Monitor contrast dye injury

## 2020-07-05 NOTE — PROGRESS NOTES
Discharge Planning Assessment  [facility]     Patient Name: Pancho Bonner  MRN: [id number]  Today's Date: 7/5/2020    Admit Date: 7/4/2020    Discharge Needs Assessment     Row Name 07/05/20 1158       Living Environment    Lives With  spouse    Name(s) of Who Lives With Patient  Cheryl Bonner, spouse [phone]    Current Living Arrangements  home/apartment/condo    Duration at Residence  4 years    Primary Care Provided by  self    Provides Primary Care For  no one    Family Caregiver if Needed  spouse    Quality of Family Relationships  supportive    Able to Return to Prior Arrangements  yes       Resource/Environmental Concerns    Resource/Environmental Concerns  none       Transition Planning    Patient/Family Anticipates Transition to  home    Patient/Family Anticipated Services at Transition  none    Transportation Anticipated  family or friend will provide       Discharge Needs Assessment    Readmission Within the Last 30 Days  no previous admission in last 30 days    Equipment Currently Used at Home  wheelchair;rollator;cane, straight;glucometer;walker, rolling    Equipment Needed After Discharge  none    Outpatient/Agency/Support Group Needs  homecare agency Pt has stated he has had HH services in the past but has not benefited from them        Discharge Plan     Row Name 07/05/20 1201       Plan    Plan  Spoke with pt about discharge plan Confirmed current address and phone number Cheryl Bonner, spouse [phone] and is also his POA  PCP Mady Coy MD  Pt states he is able to do ADL good and without problems   He has someone clean his house every 3 weeks and he shares cooking and kitchen duty with his wife  DME rollator, rolling walker, WC, glucometer, & cane  Pt has had rehab at the [facility] in the past but only stayed 1 week bc he thought he could do it at home   Pt has had HH services in the past and can't remember the name but feels they also don't help much   Will continue to assess pt  for any further needs prior to being discharged home  Pt anticipates being discharged tomorrow          Destination      Coordination has not been started for this encounter.      Durable Medical Equipment      Coordination has not been started for this encounter.      Dialysis/Infusion      Coordination has not been started for this encounter.      Home Medical Care      Coordination has not been started for this encounter.      Therapy      Coordination has not been started for this encounter.      Community Resources      Coordination has not been started for this encounter.        Expected Discharge Date and Time     Expected Discharge Date Expected Discharge Time    Jul 7, 2020         Demographic Summary     Row Name 07/05/20 1154       General Information    Admission Type  inpatient    Arrived From  other (see comments) Pt went straight to the cath lab on arrival    Referral Source  admission list    Reason for Consult  discharge planning    Preferred Language  English       Contact Information    Permission Granted to Share Info With      Contact Information Obtained for          Functional Status     Row Name 07/05/20 1156       Functional Status    Usual Activity Tolerance  good    Functional Status Comments  Pt states he can take care of himself good but has someone clean his home every 3 weeks  He is on the treadmill daily       Functional Status, IADL    Medications  independent    Meal Preparation  independent    Housekeeping  independent    Laundry  independent    Shopping  independent       Employment/    Employment Status  retired        Psychosocial    No documentation.       Abuse/Neglect    No documentation.       Legal    No documentation.       Substance Abuse    No documentation.       Patient Forms    No documentation.           Carmen Oakes RN

## 2020-07-06 VITALS
TEMPERATURE: 97.6 F | HEART RATE: 67 BPM | SYSTOLIC BLOOD PRESSURE: 101 MMHG | BODY MASS INDEX: 22.8 KG/M2 | OXYGEN SATURATION: 95 % | DIASTOLIC BLOOD PRESSURE: 50 MMHG | HEIGHT: 75 IN | WEIGHT: 183.4 LBS | RESPIRATION RATE: 18 BRPM

## 2020-07-06 LAB
ANION GAP SERPL CALCULATED.3IONS-SCNC: 9 MMOL/L (ref 5–15)
BASOPHILS # BLD AUTO: 0.02 10*3/MM3 (ref 0–0.2)
BASOPHILS NFR BLD AUTO: 0.3 % (ref 0–1.5)
BUN SERPL-MCNC: 45 MG/DL (ref 8–23)
BUN/CREAT SERPL: 16 (ref 7–25)
CALCIUM SPEC-SCNC: 8.6 MG/DL (ref 8.6–10.5)
CHLORIDE SERPL-SCNC: 107 MMOL/L (ref 98–107)
CO2 SERPL-SCNC: 22 MMOL/L (ref 22–29)
CREAT SERPL-MCNC: 2.82 MG/DL (ref 0.76–1.27)
DEPRECATED RDW RBC AUTO: 48.6 FL (ref 37–54)
EOSINOPHIL # BLD AUTO: 0.32 10*3/MM3 (ref 0–0.4)
EOSINOPHIL NFR BLD AUTO: 4.7 % (ref 0.3–6.2)
ERYTHROCYTE [DISTWIDTH] IN BLOOD BY AUTOMATED COUNT: 14.2 % (ref 12.3–15.4)
GFR SERPL CREATININE-BSD FRML MDRD: 22 ML/MIN/1.73
GLUCOSE BLDC GLUCOMTR-MCNC: 88 MG/DL (ref 70–130)
GLUCOSE SERPL-MCNC: 83 MG/DL (ref 65–99)
HCT VFR BLD AUTO: 35.1 % (ref 37.5–51)
HGB BLD-MCNC: 11.2 G/DL (ref 13–17.7)
IMM GRANULOCYTES # BLD AUTO: 0.05 10*3/MM3 (ref 0–0.05)
IMM GRANULOCYTES NFR BLD AUTO: 0.7 % (ref 0–0.5)
LYMPHOCYTES # BLD AUTO: 0.92 10*3/MM3 (ref 0.7–3.1)
LYMPHOCYTES NFR BLD AUTO: 13.5 % (ref 19.6–45.3)
MCH RBC QN AUTO: 29.4 PG (ref 26.6–33)
MCHC RBC AUTO-ENTMCNC: 31.9 G/DL (ref 31.5–35.7)
MCV RBC AUTO: 92.1 FL (ref 79–97)
MONOCYTES # BLD AUTO: 0.72 10*3/MM3 (ref 0.1–0.9)
MONOCYTES NFR BLD AUTO: 10.6 % (ref 5–12)
NEUTROPHILS NFR BLD AUTO: 4.77 10*3/MM3 (ref 1.7–7)
NEUTROPHILS NFR BLD AUTO: 70.2 % (ref 42.7–76)
NRBC BLD AUTO-RTO: 0 /100 WBC (ref 0–0.2)
PLATELET # BLD AUTO: 156 10*3/MM3 (ref 140–450)
PMV BLD AUTO: 11 FL (ref 6–12)
POTASSIUM SERPL-SCNC: 4.8 MMOL/L (ref 3.5–5.2)
RBC # BLD AUTO: 3.81 10*6/MM3 (ref 4.14–5.8)
SODIUM SERPL-SCNC: 138 MMOL/L (ref 136–145)
WBC # BLD AUTO: 6.8 10*3/MM3 (ref 3.4–10.8)

## 2020-07-06 PROCEDURE — 99231 SBSQ HOSP IP/OBS SF/LOW 25: CPT | Performed by: FAMILY MEDICINE

## 2020-07-06 PROCEDURE — 94799 UNLISTED PULMONARY SVC/PX: CPT

## 2020-07-06 PROCEDURE — 99239 HOSP IP/OBS DSCHRG MGMT >30: CPT | Performed by: INTERNAL MEDICINE

## 2020-07-06 PROCEDURE — 82962 GLUCOSE BLOOD TEST: CPT

## 2020-07-06 PROCEDURE — 80048 BASIC METABOLIC PNL TOTAL CA: CPT | Performed by: FAMILY MEDICINE

## 2020-07-06 PROCEDURE — 85025 COMPLETE CBC W/AUTO DIFF WBC: CPT | Performed by: FAMILY MEDICINE

## 2020-07-06 RX ORDER — ASPIRIN 81 MG/1
81 TABLET ORAL DAILY
Qty: 30 TABLET | Refills: 11 | Status: SHIPPED | OUTPATIENT
Start: 2020-07-07

## 2020-07-06 RX ADMIN — IPRATROPIUM BROMIDE AND ALBUTEROL SULFATE 3 ML: .5; 3 SOLUTION RESPIRATORY (INHALATION) at 06:57

## 2020-07-06 RX ADMIN — ASPIRIN 81 MG: 81 TABLET, COATED ORAL at 08:14

## 2020-07-06 RX ADMIN — PAROXETINE HYDROCHLORIDE 5 MG: 10 TABLET, FILM COATED ORAL at 06:15

## 2020-07-06 RX ADMIN — APIXABAN 2.5 MG: 2.5 TABLET, FILM COATED ORAL at 11:37

## 2020-07-06 RX ADMIN — PANTOPRAZOLE SODIUM 40 MG: 40 TABLET, DELAYED RELEASE ORAL at 08:14

## 2020-07-06 RX ADMIN — CARVEDILOL 3.12 MG: 3.12 TABLET, FILM COATED ORAL at 08:14

## 2020-07-06 RX ADMIN — POLYETHYLENE GLYCOL 3350 17 G: 17 POWDER, FOR SOLUTION ORAL at 08:14

## 2020-07-06 RX ADMIN — AMIODARONE HYDROCHLORIDE 200 MG: 200 TABLET ORAL at 08:14

## 2020-07-06 RX ADMIN — CLOPIDOGREL BISULFATE 75 MG: 75 TABLET ORAL at 08:14

## 2020-07-06 RX ADMIN — TAMSULOSIN HYDROCHLORIDE 0.4 MG: 0.4 CAPSULE ORAL at 08:14

## 2020-07-06 NOTE — PROGRESS NOTES
"Nephrology Progress Note.    LOS: 2 days    Patient Care Team:  Mady Coy MD as PCP - General (Internal Medicine)  Shar Obregon MD as PCP - Claims Attributed  Shorty Guido MD as Consulting Physician (General Surgery)    Chief Complaint:  No chief complaint on file.      Subjective:   Follow up for STEPHANIE and Chronic Kidney disease stage 3  Interval History:   Patient Complaints: none  Patient seen and examined this morning.  Events from last night and the weekend noted.  Patient denies having any fevers chills.  No nausea or vomiting no abdominal pain.  Denies any chest pain, shortness of breath or cough and sputum production.  There is no significant edema.   Patient also denies having new onset weakness of numbness of either extremity.  He reports he is unable to transfer from bed to chair without becoming dizzy but is able to move from lying to sitting position without dizziness.  He reports he will likely be discharged home today.   History taken from: patient and his wife    Objective:    Vital Signs  /50 (BP Location: Left arm, Patient Position: Lying)   Pulse 67   Temp 97.6 °F (36.4 °C) (Oral)   Resp 18   Ht 190.5 cm (75\")   Wt 83.2 kg (183 lb 6.4 oz)   SpO2 95%   BMI 22.92 kg/m²     No intake/output data recorded.    Intake/Output Summary (Last 24 hours) at 7/6/2020 0841  Last data filed at 7/6/2020 0600  Gross per 24 hour   Intake 1092 ml   Output 850 ml   Net 242 ml       Physical Exam:  General Appearance: alert, oriented x 3, no acute distress,   HEENT: Oral mucosa dry, extra occular movements intact. Sclera clear.  Skin: Warm and dry  Neck: supple, no JVD, trachea midline  Lungs:Chest shape is normal. Breath sounds heard bilaterally equally. Rhonci in bases, cleared with cough.  No crackles, No wheezing.   Heart: Regular rate and rhythm. normal S1 and S2, no S3, no rub, peripheral pulses weak but palpable.  Abdomen: Soft, non-tender,  present bowel sounds to " auscultation  : no palpable bladder.  Extremities: No edema, no cyanosis or clubbing.   Neuro: normal speech and mental status, grossly non focal.     Results Review:   Results from last 7 days   Lab Units 07/06/20  0609 07/05/20 0427 07/04/20  0924 07/04/20  0658 07/04/20  0500   SODIUM mmol/L 138 139  --  134* 133*   POTASSIUM mmol/L 4.8 4.7 5.3* 5.4* 4.7   CHLORIDE mmol/L 107 107  --  99 102   CO2 mmol/L 22.0 23.6  --  24.7 21.9*   BUN mg/dL 45* 43*  --  38* 37*   CREATININE mg/dL 2.82* 2.83*  --  2.50* 2.16*   CALCIUM mg/dL 8.6 8.5*  --  8.8 7.7*   ALBUMIN g/dL  --   --   --   --  3.00*   BILIRUBIN mg/dL  --   --   --   --  0.8   ALK PHOS U/L  --   --   --   --  71   ALT (SGPT) U/L  --   --   --   --  38   AST (SGOT) U/L  --   --   --   --  34   GLUCOSE mg/dL 83 105*  --  162* 165*     Estimated Creatinine Clearance: 24.6 mL/min (A) (by C-G formula based on SCr of 2.82 mg/dL (H)).          Results from last 7 days   Lab Units 07/06/20  0609 07/05/20  0428 07/04/20 2020 07/04/20  1508 07/04/20  0658 07/04/20  0500   WBC 10*3/mm3 6.80  --   --   --  18.38* 16.41*   HEMOGLOBIN g/dL 11.2* 11.7* 12.1* 12.9* 13.8 13.1   PLATELETS 10*3/mm3 156  --   --   --  184 167     Results from last 7 days   Lab Units 07/04/20  1508 07/04/20  0500   INR  1.52* 1.72*     Brief Urine Lab Results  (Last result in the past 365 days)      Color   Clarity   Blood   Leuk Est   Nitrite   Protein   CREAT   Urine HCG        07/04/20 1904 Yellow Clear Negative Negative Negative Negative             No results found for: UTPCR  Imaging Results (Last 24 Hours)     ** No results found for the last 24 hours. **          amiodarone 200 mg Oral Daily   aspirin 81 mg Oral Daily   atorvastatin 80 mg Oral Nightly   carvedilol 3.125 mg Oral BID With Meals   clopidogrel 75 mg Oral Daily   insulin aspart 0-7 Units Subcutaneous TID AC   ipratropium-albuterol 3 mL Nebulization Q6H - RT   pantoprazole 40 mg Oral Daily   PARoxetine 5 mg Oral QAM    polyethylene glycol 1 packet Oral Daily   tamsulosin 0.4 mg Oral Daily            Medication Review:   Current Facility-Administered Medications   Medication Dose Route Frequency Provider Last Rate Last Dose   • acetaminophen (TYLENOL) tablet 650 mg  650 mg Oral Q4H PRN Vasyl Sue MD   650 mg at 07/04/20 1631   • ALPRAZolam (XANAX) tablet 0.25 mg  0.25 mg Oral TID PRN Vasyl Sue MD       • amiodarone (PACERONE) tablet 200 mg  200 mg Oral Daily Vasyl Sue MD   200 mg at 07/06/20 0814   • aspirin EC tablet 81 mg  81 mg Oral Daily Vasyl Sue MD   81 mg at 07/06/20 0814   • atorvastatin (LIPITOR) tablet 80 mg  80 mg Oral Nightly Vasyl Sue MD   80 mg at 07/05/20 2013   • bisacodyl (DULCOLAX) EC tablet 10 mg  10 mg Oral Daily PRN Vasyl Sue MD       • carvedilol (COREG) tablet 3.125 mg  3.125 mg Oral BID With Meals Vasyl Sue MD   3.125 mg at 07/06/20 0814   • clopidogrel (PLAVIX) tablet 75 mg  75 mg Oral Daily Vasyl Sue MD   75 mg at 07/06/20 0814   • dextrose (D50W) 25 g/ 50mL Intravenous Solution 25 g  25 g Intravenous Q15 Min PRN Vasyl Sue MD       • dextrose (GLUTOSE) oral gel 1 tube  1 tube Oral Q15 Min PRN Vasyl Sue MD       • diphenhydrAMINE (BENADRYL) injection 25 mg  25 mg Intravenous Q6H PRN Vasyl Sue MD       • glucagon (human recombinant) (GLUCAGEN DIAGNOSTIC) injection 1 mg  1 mg Subcutaneous PRN Vasyl Sue MD       • HYDROcodone-acetaminophen (NORCO) 5-325 MG per tablet 1 tablet  1 tablet Oral Q4H PRN Vasyl Sue MD       • insulin aspart (novoLOG) injection 0-7 Units  0-7 Units Subcutaneous TID AC Vasyl Sue MD   Stopped at 07/04/20 1758   • ipratropium-albuterol (DUO-NEB) nebulizer solution 3 mL  3 mL Nebulization Q6H - RT Vasyl Sue MD   3 mL at 07/06/20 0657   • Morphine sulfate (PF) injection 4 mg  4 mg Intravenous Q1H PRN Vasyl Sue MD        And   • naloxone (NARCAN)  injection 0.4 mg  0.4 mg Intravenous Q5 Min PRN Vasyl Sue MD       • nitroglycerin (NITROSTAT) SL tablet 0.4 mg  0.4 mg Sublingual Q5 Min PRN Vasyl Sue MD       • ondansetron (ZOFRAN) tablet 4 mg  4 mg Oral Q6H PRN Vasyl Sue MD        Or   • ondansetron (ZOFRAN) injection 4 mg  4 mg Intravenous Q6H PRN Vasyl Sue MD   4 mg at 07/04/20 1708   • pantoprazole (PROTONIX) EC tablet 40 mg  40 mg Oral Daily Vasyl Sue MD   40 mg at 07/06/20 0814   • PARoxetine (PAXIL) tablet 5 mg  5 mg Oral QAM Vsayl Sue MD   5 mg at 07/06/20 0615   • polyethylene glycol (MIRALAX) packet 17 g  1 packet Oral Daily Vasyl Sue MD   17 g at 07/06/20 0814   • sodium chloride 0.9 % bolus 1,000 mL  1,000 mL Intravenous Once PRN Vasyl Sue MD       • tamsulosin (FLOMAX) 24 hr capsule 0.4 mg  0.4 mg Oral Daily Vasyl Sue MD   0.4 mg at 07/06/20 0814   • temazepam (RESTORIL) capsule 15 mg  15 mg Oral Nightly PRN Vasyl Sue MD           Assessment/Plan:    1. Acute kidney injury: contrast injury  2. Chronic kidney disease stage III: patient follows with me annually.  Most recent creatinine 1.4  3. Hyperkalemia: resolved  4. Anemia with acute blood loss  5. Hypertension with CKD  6. STEMI: s/p stents   7. Diabetes mellitus (CMS/HCC)  8. Ischemic cardiomyopathy  9. Hyperlipidemia  10. H/O NSVT status post PPM/ICD  11. Coronary artery disease with CABG in 2000, and 2016. Stent placed February 2017  12. History of PAF      Plan:  · Patient will most likely be discharged today, will need to follow up with me in 10 days.  · Creatinine is stable at 2.82.  Patient's BP has been low, cardiology titrating BP medications.   · Details were discussed with the patient and family in the room.    · Details were also discussed with the hospitalist service.   · Surveillance labs.  · Further recommendations will depend on clinical course of the patient during the current  hospitalization.    · I also discussed the details with the nursing staff.  · Rest as ordered.    Andrade Cain MD, DEVINN  07/06/20  08:41    Dictated utilizing Dragon dictation.

## 2020-07-06 NOTE — PROGRESS NOTES
Nutrition Services    Patient Name:  Pancho Bonner  YOB: 1940  MRN: [id number]  Admit Date:  7/4/2020    DM and heart healthy diet educational materials mailed to pt. Due to recent discharge.    Electronically signed by:  Harriet Stone RD  07/06/20 13:25

## 2020-07-06 NOTE — DISCHARGE SUMMARY
[facility]-Cardiology Discharge Summary    Date of Discharge:  7/6/2020    Discharge Diagnosis:    Non-ST elevation myocardial infarction  Left ventricular anterior-apical aneurysm  Coronary artery disease.  Native heart.  Native vessels.  Presentation with acute coronary syndrome  Heart failure with reduced ejection fraction.  Acute on chronic left ventricular systolic heart failure  Chronic renal failure  Essential hypertension  Dyslipidemia  Paroxysmal atrial fibrillation    Presenting Problem/History of Present Illness  ST elevation myocardial infarction (STEMI) (CMS/[facility]) [I21.3]        Hospital Course  Patient is a 80 y.o. male presented with severe subacute onset of shortness of breath and chest pressure.  911 was notified and the paramedics performed a twelve-lead electrocardiogram in the field.  This demonstrated sinus rhythm with a incomplete right bundle branch block as well as loss of all voltage across the mid and left precordium.  There was a slight degree of ST segment elevation in lead V6, I and aVL without reciprocal ST segment depression.  The patient underwent emergency coronary angiography without any details of the fact that the patient had had not 1 but 2 prior coronary artery bypass surgeries.  Coronary angiography demonstrated occlusion of the left anterior descending at its takeoff from the left main with a patent LIMA to LAD graft.  The apical recurrent portion of the left anterior descending was diffusely and severely diseased with a reference vessel lumen diameter of less than 0.5 mm.  The patient circumflex coronary artery demonstrated chronic occlusion of the first obtuse marginal branch which was bypass graft protected.  The distal AV groove vessel was severely diseased with a patent sequential vein graft to the third obtuse marginal branch.  The right coronary artery had a proximal 95+% stenosis.  Unknown at the time the patient was reported to have a CHARLES graft to the PDA.  However, the PDA  was visualized with no evidence of competitive flow or retrograde filling of a patent graft.  The patient underwent angioplasty and stenting of the ostial proximal and mid RCA and a complicated PCI procedure due to tortuosity and severe calcification.  The angiographic result was excellent.  It was only after the procedure that we learned that the patient had had a previous single-vessel coronary artery bypass surgery in 2000 with an LIMA to the RCA.  Sometime around 2010 the patient had redo coronary artery bypass surgery with a saphenous vein graft to a diagonal branch, a sequential saphenous vein graft to the first and third obtuse marginal branch and a CHARLES graft to the PDA.  On coronary angiography the vein graft to the diagonal was widely patent with no stenosis and a good runoff vessel.  The sequential saphenous vein graft had previously been stented at the aorto ostial and proximal location and the stent remained widely patent.  Both distal anastomoses to the OM1 and OM 3 branches were widely patent.  However, the runoff vessel was extremely small in caliber with diffuse-non-focal disease.  Contrast ventriculogram was performed which showed an impressively large anterior and apical left ventricular aneurysm.  The ejection fraction was approximately 25%.  The patient has a normally functioning dual-chamber ICD with biventricular pacing capabilities.  The patient's last echocardiogram from 2017 suggested an ejection fraction of 45% with inferior akinesis.  By contrast ventriculogram the inferior wall is the only wall that continues to squeeze normally.  The patient was also discovered to have chronic renal failure with a serum creatinine of 2.5 mg/dL which was unknown at the time of catheterization.  The patient's catheterization was performed from a right femoral approach.  The patient's arterial puncture was initially closed with an 8 Tajik Angio-Seal device resulting in excellent hemostasis.  The patient  "later developed some oozing around the arterial closure site and it was discovered that the patient was currently taking aspirin 325 mg/day, Plavix 70 mg orally TWICE PER DAY, warfarin 5 mg/day and Eliquis 2.5 mg twice per day.  Initially I was dubious to believe the patient would be on such an aggressive antiplatelet and anti-coagulation strategy with no guideline directed therapy to suggest this combination was either safe or effective.  This combination of \"4\" blood thinners in a patient in his ninth decade of life with renal failure would be considered highly dangerous from a bleeding perspective.  However, the patient's wife, daughter and son confirmed that these were the medications he was taking at home.  Manual pressure was held over the patient's groin for 30 minutes and a nonocclusive FemoStop was placed for 4 hours.  Fortunately he had no hematoma formation.  He had no significant drop in his hemoglobin or hematocrit.  The patient's aspirin therapy was changed to 81 mg/day.  His Plavix was decreased to 75 mg orally once per day.  Warfarin therapy was discontinued.  Eliquis therapy will be restarted 72 hours after discharge at 2.5 mg orally twice per day.  The patient was started on low-dose Coreg therapy.  ACE inhibitor/angiotensin II receptor blockade was not started due to a combination of marginally low blood pressures as well as advanced chronic renal failure.  If the patient's blood pressure tolerates he may be a candidate for initiation of BiDil as an outpatient.  The patient was maintained on low-dose amiodarone therapy for his history of paroxysmal atrial fibrillation.  The patient is continued on low-dose amiodarone at 200 mg/day.  We have requested records from his previous cardiologist at [facility] in [facility].  They will be reviewed when available.  The patient had no recurrent angina.  His shortness of breath was felt to be due to \"an angina equivalent\".  Despite " his extremely low ejection fraction and presence of a massive left ventricular LV aneurysm, cardiac filling pressures were extremely low with a left ventricular end-diastolic pressure of 0-5 mmHg.  There was no evidence of clinically overt GI blood loss.  Hemoglobin and hematocrit remained stable throughout his hospitalization.  The patient has requested transfer of cardiology service to our practice here in [place] due to increasing difficulties traveling to [facility].  The patient has been referred for outpatient cardiac rehab.    Procedures Performed  Procedure(s):  Left Heart Cath  Bilateral selective coronary angiography  Single-plane contrast left ventriculography  Angiography of arterial bypass graft-LIMA  Selective angiography of aorto coronary reverse venous bypass grafts  Balloon angioplasty x5-RCA  Drug-eluting stent placement x2-RCA       Consults:   Consults     Date and Time Order Name Status Description    7/4/2020 0913 Inpatient Nephrology Consult Completed     7/4/2020 0913 Inpatient Hospitalist Consult Completed     7/4/2020 0908 Inpatient Hospitalist Consult Completed     7/4/2020 0908 Inpatient Nephrology Consult            Pertinent Test Results: Refer to catheterization report      Echo EF Estimated  Lab Results   Component Value Date    ECHOEFEST 50 06/01/2017     Cath Ejection Fraction Quantitative   CATHEF: 25-30%.  07/04/2020    Condition on Discharge: Stable      Vital Signs  Temp:  [97.6 °F (36.4 °C)-98.6 °F (37 °C)] 97.6 °F (36.4 °C)  Heart Rate:  [63-69] 67  Resp:  [14-19] 18  BP: (100-121)/(50-68) 101/50    Physical Exam:     General Appearance:    Alert, cooperative, in no acute distress   Head:    Normocephalic, without obvious abnormality, atraumatic   Eyes:            Lids and lashes normal, conjunctivae and sclerae normal, no   icterus, no pallor, corneas clear, PERRLA   Ears:    Ears appear intact with no abnormalities noted   Throat:   No oral lesions, no thrush, oral  mucosa moist   Neck:   No adenopathy, supple, trachea midline, no thyromegaly, no   carotid bruit, no JVD   Back:     No kyphosis present, no scoliosis present, no skin lesions,      erythema or scars, no tenderness to percussion or                   palpation,   range of motion normal   Lungs:     Clear to auscultation,respirations regular, even and                  unlabored    Heart:    Regular rhythm and normal rate, normal S1 and S2, no            murmur, no gallop, no rub, no click   Chest Wall:    No abnormalities observed   Abdomen:     Normal bowel sounds, no masses, no organomegaly, soft        non-tender, non-distended, no guarding, no rebound                tenderness   Rectal:     Deferred   Extremities:   Moves all extremities well, no edema, no cyanosis, no             redness   Pulses:   Pulses palpable and equal bilaterally   Skin:   No bleeding, bruising or rash   Lymph nodes:   No palpable adenopathy   Neurologic:   Cranial nerves 2 - 12 grossly intact, sensation intact, DTR       present and equal bilaterally       Discharge Disposition  Home or Self Care    Discharge Medications     Discharge Medications      New Medications      Instructions Start Date   aspirin 81 MG EC tablet  Replaces:  aspirin 325 MG tablet   81 mg, Oral, Daily   Start Date:  July 7, 2020     PHARMACY DISCHARGE CONSULT   1 each, Does not apply, Daily (Monday-Friday)         Continue These Medications      Instructions Start Date   amiodarone 200 MG tablet  Commonly known as:  PACERONE   200 mg, Oral, Daily      apixaban 2.5 MG tablet tablet  Commonly known as:  ELIQUIS   2.5 mg, Oral, 2 Times Daily      atorvastatin 40 MG tablet  Commonly known as:  LIPITOR   80 mg, Oral, Nightly      bisacodyl 5 MG EC tablet  Commonly known as:  Dulcolax   Clear liquid diet day prior to colonoscopy. 2 at 3pm and 2 at 7pm.      carvedilol 6.25 MG tablet  Commonly known as:  COREG   6.25 mg, Oral, 2 Times Daily      clopidogrel 75 MG  tablet  Commonly known as:  PLAVIX   75 mg, Oral, Daily      nitroglycerin 0.4 MG SL tablet  Commonly known as:  NITROSTAT   0.4 mg, Sublingual, Every 5 Minutes PRN, Take no more than 3 doses in 15 minutes.       pantoprazole 40 MG EC tablet  Commonly known as:  PROTONIX   40 mg, Oral, Daily      PARoxetine 10 MG tablet  Commonly known as:  PAXIL   5 mg, Oral, Every Morning      polyethylene glycol packet  Commonly known as:  MIRALAX   17 g, Oral, Daily      tamsulosin 0.4 MG capsule 24 hr capsule  Commonly known as:  FLOMAX   0.4 mg, Oral, Daily         Stop These Medications    aspirin 325 MG tablet  Replaced by:  aspirin 81 MG EC tablet     furosemide 40 MG tablet  Commonly known as:  LASIX     KLOR-CON 20 MEQ CR tablet  Generic drug:  potassium chloride     spironolactone 25 MG tablet  Commonly known as:  ALDACTONE            Discharge Diet: Cardiac.  Consistent carbohydrate.  Renal.     Activity at Discharge: Routine post myocardial infarction restrictions for the first 1 month.    Follow-up Appointments  Future Appointments   Date Time Provider Department Center   7/20/2020  3:30 PM Vasyl Sue MD [facility] None     Additional Instructions for the Follow-ups that You Need to Schedule     Ambulatory Referral to Cardiac Rehab   As directed        1-2-week follow-up with Dr. sue at the [facility] location.    Test Results Pending at Discharge   Order Current Status    Blood Culture - Blood, Arm, Right Preliminary result    Blood Culture - Blood, Hand, Left Preliminary result           Vasyl Sue MD  07/06/20  12:01    Time: Discharge 40 min

## 2020-07-06 NOTE — NURSING NOTE
Order received for Phase II Cardiac Rehab. Staff met with patient and family member,  discussed benefits of exercise, program protocol, with educational material provided. No appointment was done, but will contact patient once he is home to see if he may want to reconsider in attending our program.  Thank you for this referral.

## 2020-07-06 NOTE — PLAN OF CARE
To be discharged home with family . No reports of pain this AM. Dressing to right arm CDI with no further bleeding this morning.  Paced rhythm on monitor.  No acute events today.

## 2020-07-06 NOTE — PLAN OF CARE
Problem: Patient Care Overview  Goal: Plan of Care Review  Outcome: Ongoing (interventions implemented as appropriate)  Flowsheets (Taken 7/6/2020 3597)  Progress: improving  Plan of Care Reviewed With: patient  Note:   Pt reported feeling better during shift.  No other changes in patient condition to report.  Will continue to monitor.

## 2020-07-06 NOTE — PROGRESS NOTES
[facility]    Name:  Pancho Bonner   Age:  80 y.o.  Sex:  male  :  1940  MRN:  [id number]   Visit Number:  [id number]  Admission Date:  2020  Date Of Service:  20  Primary Care Physician:  Mady Coy MD     LOS: 2 days :      Chief Complaint: Follow-up STEMI with diabetes and hypertension and bronchitis        Subjective / Interval History:   I saw the patient again today.  Chest pain, shortness of breath, abdominal pain.  He has had no further bleeding.  His blood pressure has remained stable.  He denies any dizziness.  Anemia is stable.  He does still have a cough but this is improving.  I feel this is related to a viral bronchitis.  He will not require any further antibiotics and I have discussed this with him.  His diabetes is well controlled and he may resume his previous home medications for this.  He agreed to see his primary care physician in 1 week.  I notified Dr. mitchell that it was okay by me for the patient to go home today.      Review of Systems:     General ROS: Patient denies any fevers, chills or loss of consciousness.  Resolved dizziness with ambulation  Ophthalmic ROS: Denies any diplopia or transient loss of vision.  ENT ROS: Denies sore throat, nasal congestion or ear pain.   Respiratory ROS: Improved cough without shortness of breath.  Cardiovascular ROS: Denies chest pain or palpitations. No history of exertional chest pain.   Gastrointestinal ROS: Denies nausea and vomiting. Denies any abdominal pain. No diarrhea.  Genito-Urinary ROS: Denies dysuria or hematuria.  Musculoskeletal ROS: Denies back pain. No muscle pain. No calf pain.  Neurological ROS: Denies any focal weakness. No loss of consciousness. Denies any numbness. Denies neck pain.   Dermatological ROS: Denies any redness or pruritis.    Vital Signs:    Temp:  [97.6 °F (36.4 °C)-98.6 °F (37 °C)] 97.6 °F (36.4 °C)  Heart Rate:  [63-69] 67  Resp:   [16-19] 18  BP: (100-121)/(50-68) 101/50    Intake and output:    I/O last 3 completed shifts:  In: 2844 [P.O.:960; I.V.:1152; IV Piggyback:732]  Out: 1250 [Urine:1250]  I/O this shift:  In: 480 [P.O.:480]  Out: 250 [Urine:250]    Physical Examination: Examined again today    General Appearance:   Chronically ill elderly man.  Alert and cooperative, not in any acute distress.   Head:    Atraumatic and normocephalic, without obvious abnormality.   Eyes:            PERRLA, conjunctivae and sclerae normal, no Icterus. No pallor. Extraocular movements are within normal limits.   Throat:   No oral lesions, no thrush, oral mucosa moist.   Neck:   Supple, trachea midline, no thyromegaly, no carotid bruit.   Lungs:     Chest shape is normal. Breath sounds heard bilaterally equally.  No crackles or wheezing. No Pleural rub or bronchial breathing.    Heart:    Normal S1 and S2, no murmur, no gallop       Extremities:   Moves all extremities well, no edema, no cyanosis, no           clubbing   Skin:   No bleeding, bruising or rash.   Neurologic:  No tremor, sensation intact, Motor power is normal and equal bilaterally.   Laboratory results:    Results from last 7 days   Lab Units 07/06/20  0609 07/05/20  0427 07/04/20  0924 07/04/20  0658 07/04/20  0500   SODIUM mmol/L 138 139  --  134* 133*   POTASSIUM mmol/L 4.8 4.7 5.3* 5.4* 4.7   CHLORIDE mmol/L 107 107  --  99 102   CO2 mmol/L 22.0 23.6  --  24.7 21.9*   BUN mg/dL 45* 43*  --  38* 37*   CREATININE mg/dL 2.82* 2.83*  --  2.50* 2.16*   CALCIUM mg/dL 8.6 8.5*  --  8.8 7.7*   BILIRUBIN mg/dL  --   --   --   --  0.8   ALK PHOS U/L  --   --   --   --  71   ALT (SGPT) U/L  --   --   --   --  38   AST (SGOT) U/L  --   --   --   --  34   GLUCOSE mg/dL 83 105*  --  162* 165*     Results from last 7 days   Lab Units 07/06/20  0609 07/05/20  0428 07/04/20 2020 07/04/20  0658 07/04/20  0500   WBC 10*3/mm3 6.80  --   --   --  18.38* 16.41*   HEMOGLOBIN g/dL 11.2* 11.7* 12.1*   < >  13.8 13.1   HEMATOCRIT % 35.1* 36.7* 37.4*   < > 42.0 40.1   PLATELETS 10*3/mm3 156  --   --   --  184 167    < > = values in this interval not displayed.     Results from last 7 days   Lab Units 07/04/20  1508 07/04/20  0500   INR  1.52* 1.72*     Results from last 7 days   Lab Units 07/04/20  0500   TROPONIN T ng/mL 0.025     Results from last 7 days   Lab Units 07/04/20  1045 07/04/20  1030   BLOODCX  No growth at 2 days No growth at 2 days           I have reviewed the patient's laboratory results.    Radiology results:    Imaging Results (Last 24 Hours)     ** No results found for the last 24 hours. **          I have reviewed the patient's radiology reports.    Medication Review:     I have reviewed the patients active and prn medications.     Assessment:  STEMI post cardiac cath post stent  Diabetes mellitus type 2, controlled  Acute blood loss anemia, post multiple iv sites bleeding, Right arm hematoma at iv site, stopped; hemoglobin stable not requiring transfusion; no further bleeding seen  Ischemic cardiomyopathy with reduced LV EF  Acute bronchitis, viral, with Human Rhinovirus, prior to admission, ruled out bacterial bronchis and this was chronic   Hypotension, likely volume depletion, complicated by cardiac medications required and for hypertension; doubt sepsis   Lactic acidemia, likely due to chronic lung disease    Plan:  He may discontinue the antibiotic.  He is stable from the hospitalist service for discharge and I notified Dr. stephen gutierres.  The patient reported understanding that he should also see his primary care physician.  He previously received Rocephin and doxycycline, which have been stopped since his bronchitis is viral.  Medication risks and benefits were discussed in detail. Patient reported satisfaction with care delivered and treatment plan.     Angie Briceño DO  07/06/20  14:47

## 2020-07-07 ENCOUNTER — READMISSION MANAGEMENT (OUTPATIENT)
Dept: CALL CENTER | Facility: HOSPITAL | Age: 80
End: 2020-07-07

## 2020-07-07 ENCOUNTER — APPOINTMENT (OUTPATIENT)
Dept: GENERAL RADIOLOGY | Facility: HOSPITAL | Age: 80
End: 2020-07-07

## 2020-07-07 ENCOUNTER — HOSPITAL ENCOUNTER (INPATIENT)
Facility: HOSPITAL | Age: 80
LOS: 3 days | Discharge: HOSPICE | End: 2020-07-10
Attending: STUDENT IN AN ORGANIZED HEALTH CARE EDUCATION/TRAINING PROGRAM | Admitting: INTERNAL MEDICINE

## 2020-07-07 DIAGNOSIS — R06.02 SHORTNESS OF BREATH: Primary | ICD-10-CM

## 2020-07-07 DIAGNOSIS — R77.8 ELEVATED TROPONIN: ICD-10-CM

## 2020-07-07 LAB
ALBUMIN SERPL-MCNC: 3.3 G/DL (ref 3.5–5.2)
ALBUMIN/GLOB SERPL: 1.2 G/DL
ALP SERPL-CCNC: 74 U/L (ref 39–117)
ALT SERPL W P-5'-P-CCNC: 36 U/L (ref 1–41)
ANION GAP SERPL CALCULATED.3IONS-SCNC: 7.9 MMOL/L (ref 5–15)
ANION GAP SERPL CALCULATED.3IONS-SCNC: 9.4 MMOL/L (ref 5–15)
AST SERPL-CCNC: 40 U/L (ref 1–40)
BASOPHILS # BLD AUTO: 0.02 10*3/MM3 (ref 0–0.2)
BASOPHILS NFR BLD AUTO: 0.3 % (ref 0–1.5)
BILIRUB SERPL-MCNC: 0.5 MG/DL (ref 0–1.2)
BUN SERPL-MCNC: 31 MG/DL (ref 8–23)
BUN SERPL-MCNC: 33 MG/DL (ref 8–23)
BUN/CREAT SERPL: 16.1 (ref 7–25)
BUN/CREAT SERPL: 16.8 (ref 7–25)
CALCIUM SPEC-SCNC: 8.4 MG/DL (ref 8.6–10.5)
CALCIUM SPEC-SCNC: 8.5 MG/DL (ref 8.6–10.5)
CHLORIDE SERPL-SCNC: 106 MMOL/L (ref 98–107)
CHLORIDE SERPL-SCNC: 106 MMOL/L (ref 98–107)
CO2 SERPL-SCNC: 22.6 MMOL/L (ref 22–29)
CO2 SERPL-SCNC: 23.1 MMOL/L (ref 22–29)
CREAT SERPL-MCNC: 1.85 MG/DL (ref 0.76–1.27)
CREAT SERPL-MCNC: 2.05 MG/DL (ref 0.76–1.27)
DEPRECATED RDW RBC AUTO: 47.9 FL (ref 37–54)
EOSINOPHIL # BLD AUTO: 0.27 10*3/MM3 (ref 0–0.4)
EOSINOPHIL NFR BLD AUTO: 4.7 % (ref 0.3–6.2)
ERYTHROCYTE [DISTWIDTH] IN BLOOD BY AUTOMATED COUNT: 14.1 % (ref 12.3–15.4)
GFR SERPL CREATININE-BSD FRML MDRD: 31 ML/MIN/1.73
GFR SERPL CREATININE-BSD FRML MDRD: 35 ML/MIN/1.73
GLOBULIN UR ELPH-MCNC: 2.8 GM/DL
GLUCOSE SERPL-MCNC: 145 MG/DL (ref 65–99)
GLUCOSE SERPL-MCNC: 98 MG/DL (ref 65–99)
HCT VFR BLD AUTO: 37.6 % (ref 37.5–51)
HGB BLD-MCNC: 12.1 G/DL (ref 13–17.7)
HOLD SPECIMEN: NORMAL
HOLD SPECIMEN: NORMAL
IMM GRANULOCYTES # BLD AUTO: 0.04 10*3/MM3 (ref 0–0.05)
IMM GRANULOCYTES NFR BLD AUTO: 0.7 % (ref 0–0.5)
LYMPHOCYTES # BLD AUTO: 0.89 10*3/MM3 (ref 0.7–3.1)
LYMPHOCYTES NFR BLD AUTO: 15.5 % (ref 19.6–45.3)
MCH RBC QN AUTO: 29.9 PG (ref 26.6–33)
MCHC RBC AUTO-ENTMCNC: 32.2 G/DL (ref 31.5–35.7)
MCV RBC AUTO: 92.8 FL (ref 79–97)
MONOCYTES # BLD AUTO: 0.52 10*3/MM3 (ref 0.1–0.9)
MONOCYTES NFR BLD AUTO: 9 % (ref 5–12)
NEUTROPHILS NFR BLD AUTO: 4.01 10*3/MM3 (ref 1.7–7)
NEUTROPHILS NFR BLD AUTO: 69.8 % (ref 42.7–76)
NRBC BLD AUTO-RTO: 0 /100 WBC (ref 0–0.2)
NT-PROBNP SERPL-MCNC: 2773 PG/ML (ref 0–1800)
PLATELET # BLD AUTO: 173 10*3/MM3 (ref 140–450)
PMV BLD AUTO: 10.6 FL (ref 6–12)
POTASSIUM SERPL-SCNC: 4.3 MMOL/L (ref 3.5–5.2)
POTASSIUM SERPL-SCNC: 4.4 MMOL/L (ref 3.5–5.2)
PROT SERPL-MCNC: 6.1 G/DL (ref 6–8.5)
RBC # BLD AUTO: 4.05 10*6/MM3 (ref 4.14–5.8)
SODIUM SERPL-SCNC: 137 MMOL/L (ref 136–145)
SODIUM SERPL-SCNC: 138 MMOL/L (ref 136–145)
TROPONIN T SERPL-MCNC: 0.13 NG/ML (ref 0–0.03)
TROPONIN T SERPL-MCNC: 0.13 NG/ML (ref 0–0.03)
TROPONIN T SERPL-MCNC: 0.15 NG/ML (ref 0–0.03)
WBC # BLD AUTO: 5.75 10*3/MM3 (ref 3.4–10.8)
WHOLE BLOOD HOLD SPECIMEN: NORMAL
WHOLE BLOOD HOLD SPECIMEN: NORMAL

## 2020-07-07 PROCEDURE — 83880 ASSAY OF NATRIURETIC PEPTIDE: CPT | Performed by: STUDENT IN AN ORGANIZED HEALTH CARE EDUCATION/TRAINING PROGRAM

## 2020-07-07 PROCEDURE — 71046 X-RAY EXAM CHEST 2 VIEWS: CPT

## 2020-07-07 PROCEDURE — 85025 COMPLETE CBC W/AUTO DIFF WBC: CPT | Performed by: STUDENT IN AN ORGANIZED HEALTH CARE EDUCATION/TRAINING PROGRAM

## 2020-07-07 PROCEDURE — 84484 ASSAY OF TROPONIN QUANT: CPT | Performed by: STUDENT IN AN ORGANIZED HEALTH CARE EDUCATION/TRAINING PROGRAM

## 2020-07-07 PROCEDURE — 25010000002 FUROSEMIDE PER 20 MG: Performed by: INTERNAL MEDICINE

## 2020-07-07 PROCEDURE — 99284 EMERGENCY DEPT VISIT MOD MDM: CPT

## 2020-07-07 PROCEDURE — 80053 COMPREHEN METABOLIC PANEL: CPT | Performed by: STUDENT IN AN ORGANIZED HEALTH CARE EDUCATION/TRAINING PROGRAM

## 2020-07-07 PROCEDURE — 25010000002 ENOXAPARIN PER 10 MG: Performed by: INTERNAL MEDICINE

## 2020-07-07 PROCEDURE — 84484 ASSAY OF TROPONIN QUANT: CPT | Performed by: INTERNAL MEDICINE

## 2020-07-07 PROCEDURE — 93005 ELECTROCARDIOGRAM TRACING: CPT | Performed by: STUDENT IN AN ORGANIZED HEALTH CARE EDUCATION/TRAINING PROGRAM

## 2020-07-07 RX ORDER — HYDRALAZINE HYDROCHLORIDE 10 MG/1
10 TABLET, FILM COATED ORAL EVERY 8 HOURS SCHEDULED
Status: DISCONTINUED | OUTPATIENT
Start: 2020-07-07 | End: 2020-07-09

## 2020-07-07 RX ORDER — ISOSORBIDE DINITRATE 10 MG/1
10 TABLET ORAL
Status: DISCONTINUED | OUTPATIENT
Start: 2020-07-08 | End: 2020-07-07

## 2020-07-07 RX ORDER — DIPHENOXYLATE HYDROCHLORIDE AND ATROPINE SULFATE 2.5; .025 MG/1; MG/1
1 TABLET ORAL 4 TIMES DAILY PRN
COMMUNITY

## 2020-07-07 RX ORDER — ISOSORBIDE DINITRATE 10 MG/1
10 TABLET ORAL
Status: DISCONTINUED | OUTPATIENT
Start: 2020-07-08 | End: 2020-07-09

## 2020-07-07 RX ORDER — BUMETANIDE 0.25 MG/ML
1 INJECTION INTRAMUSCULAR; INTRAVENOUS ONCE
Status: COMPLETED | OUTPATIENT
Start: 2020-07-07 | End: 2020-07-07

## 2020-07-07 RX ORDER — ASPIRIN 81 MG/1
81 TABLET ORAL DAILY
Status: DISCONTINUED | OUTPATIENT
Start: 2020-07-07 | End: 2020-07-08

## 2020-07-07 RX ORDER — CARVEDILOL 6.25 MG/1
6.25 TABLET ORAL 2 TIMES DAILY WITH MEALS
Status: DISCONTINUED | OUTPATIENT
Start: 2020-07-07 | End: 2020-07-10 | Stop reason: HOSPADM

## 2020-07-07 RX ORDER — SODIUM CHLORIDE 0.9 % (FLUSH) 0.9 %
10 SYRINGE (ML) INJECTION EVERY 12 HOURS SCHEDULED
Status: DISCONTINUED | OUTPATIENT
Start: 2020-07-07 | End: 2020-07-10 | Stop reason: HOSPADM

## 2020-07-07 RX ORDER — SODIUM CHLORIDE 0.9 % (FLUSH) 0.9 %
10 SYRINGE (ML) INJECTION AS NEEDED
Status: DISCONTINUED | OUTPATIENT
Start: 2020-07-07 | End: 2020-07-10 | Stop reason: HOSPADM

## 2020-07-07 RX ORDER — GLIMEPIRIDE 1 MG/1
1 TABLET ORAL
COMMUNITY

## 2020-07-07 RX ORDER — CLOPIDOGREL BISULFATE 75 MG/1
75 TABLET ORAL DAILY
Status: DISCONTINUED | OUTPATIENT
Start: 2020-07-07 | End: 2020-07-08

## 2020-07-07 RX ORDER — AMIODARONE HYDROCHLORIDE 200 MG/1
200 TABLET ORAL
Status: DISCONTINUED | OUTPATIENT
Start: 2020-07-07 | End: 2020-07-08

## 2020-07-07 RX ORDER — FUROSEMIDE 10 MG/ML
80 INJECTION INTRAMUSCULAR; INTRAVENOUS EVERY 12 HOURS
Status: COMPLETED | OUTPATIENT
Start: 2020-07-07 | End: 2020-07-09

## 2020-07-07 RX ADMIN — HYDRALAZINE HYDROCHLORIDE 10 MG: 10 TABLET, FILM COATED ORAL at 23:22

## 2020-07-07 RX ADMIN — CARVEDILOL 6.25 MG: 6.25 TABLET, FILM COATED ORAL at 21:47

## 2020-07-07 RX ADMIN — FUROSEMIDE 80 MG: 10 INJECTION, SOLUTION INTRAMUSCULAR; INTRAVENOUS at 21:47

## 2020-07-07 RX ADMIN — ENOXAPARIN SODIUM 40 MG: 40 INJECTION SUBCUTANEOUS at 21:53

## 2020-07-07 RX ADMIN — BUMETANIDE 1 MG: 0.25 INJECTION INTRAMUSCULAR; INTRAVENOUS at 17:38

## 2020-07-07 NOTE — ED PROVIDER NOTES
Subjective   This patient was seen here on 7/4/2020, 3 days ago for an ST elevation myocardial infarction.  He underwent cardiac catheterization and had 2 stents placed.  He was discharged home and he says last night start developing some shortness of breath.  No chest pain.  He states he has a productive cough with clear sputum.  He states he had a cough while here and was given 2 days of antibiotics although none to go home with.  He states the shortness of breath is worse when lying flat on his back and with exertion.  He has no pain or swelling in his lower extremities.          Review of Systems   Constitutional: Negative.    HENT: Negative.    Eyes: Negative.    Respiratory: Positive for cough and shortness of breath.    Cardiovascular: Negative.  Negative for chest pain.   Gastrointestinal: Negative.    Genitourinary: Negative.    Musculoskeletal: Negative.    Skin: Negative.    Allergic/Immunologic: Negative.    Neurological: Negative.    Psychiatric/Behavioral: Negative.    All other systems reviewed and are negative.      Past Medical History:   Diagnosis Date   • Anxiety    • Arnold-Chiari malformation, type I (CMS/HCC)     followed by Dr. Martinez, but last note available was 2014   • BPH (benign prostatic hyperplasia)    • Chronic Pleural effusion on right    • Complex partial seizure (CMS/HCC)     followed by Dr. Martinez, but last note available was 2014   • Congestive heart failure (CMS/HCC)     follows with Dr. 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   • Diabetes mellitus (CMS/HCC)    • ED (erectile dysfunction)    • GERD (gastroesophageal reflux disease)    • Hyperlipidemia    • Hypertension    • Ischemic cardiomyopathy    • Myocardial infarction (CMS/HCC)    • Non-sustained ventricular tachycardia (CMS/HCC)    • Renal disorder        Allergies   Allergen Reactions   • Latex Rash        Past Surgical History:   Procedure Laterality Date   • CARDIAC CATHETERIZATION  02/2016    SJM, 3 vessel disease, patent LIMA to LAD bypass graft    • CARDIAC CATHETERIZATION  05/25/2017    SJM, severe 3 vessel disease, patent LIMA to LAD bypass graft, recommend medical management    • CARDIAC CATHETERIZATION N/A 7/4/2020    Procedure: Left Heart Cath;  Surgeon: Vasyl Sue MD;  Location: [facility] CATH INVASIVE LOC[facility];  Service: Cardiovascular;  Laterality: N/A;   • CARDIAC DEFIBRILLATOR PLACEMENT  08/2009    w/ PPM Medtronic (DDD)   • COLONOSCOPY W/ BIOPSIES     • CORONARY ANGIOPLASTY WITH STENT PLACEMENT     • CORONARY ARTERY BYPASS GRAFT  2000    M   • INSERT / REPLACE / REMOVE PACEMAKER  05/26/2017    Medtronic generator change, Dr. [facility]    • INTERVENTIONAL RADIOLOGY PROCEDURE Bilateral 6/1/2017    Procedure: Carotid Cerebral Angiogram;  Surgeon: Wil Rodrigez MD;  Location: [facility] CATH INVASIVE LOC[facility];  Service:        Family History   Problem Relation Age of Onset   • Diabetes Mother    • Hypertension Mother    • Heart disease Mother    • Heart disease Father    • Stroke Father    • Heart disease Sister    • Diabetes Other    • Heart disease Other    • Hypertension Other    • Stroke Other        Social History     Socioeconomic History   • Marital status:      Spouse name: Not on file   • Number of children: Not on file   • Years of education: Not on file   • Highest education level: Not on file   Occupational History     Employer: RETIRED   Tobacco Use   • Smoking status: Never Smoker   • Smokeless tobacco: Never Used   Substance and Sexual Activity   • Alcohol use: No   • Drug use: No   • Sexual activity: Defer     Comment:            Objective   Physical Exam   Constitutional: He appears well-developed and well-nourished.   HENT:   Head: Normocephalic and atraumatic.   Eyes: EOM are normal.   Cardiovascular: Normal rate and regular rhythm.   Pulmonary/Chest: Effort  normal. He has wheezes. He has rales.   Abdominal: Soft. There is no tenderness.   Musculoskeletal: Normal range of motion.        Right lower leg: Normal. He exhibits no tenderness and no edema.        Left lower leg: Normal. He exhibits no tenderness and no edema.   Neurological: He is alert.   Skin: Skin is warm and dry.   Psychiatric: He has a normal mood and affect. His behavior is normal.   Nursing note and vitals reviewed.      Procedures           ED Course  ED Course as of Jul 07 1738   Tue Jul 07, 2020   1640 EKG shows atrial pacemaker with a rate of 66.  Nonspecific T waves.  Abnormal EKG.  Interpreted by me.    [DT]   1645 proBNP(!): 2,773.0 [TM]      ED Course User Index  [DT] Vic Jensen MD  [TM] Weston Chiu PA-C                                           [facility]  1725  Spoke with Dr. mitchell.  Made him aware of chest x-ray and troponin and BNP and creatinine.  He recommends admission to the hospitalist service for diuresis.    1735  Spoke with Dr. Suarez recommends 1 mg of Bumex.  Will admit to [facility] telemetry.  Computer system states Bumex on his national shortage but I spoke with pharmacy and they are putting in a dose x1.  Final diagnoses:   Shortness of breath   Elevated troponin            Weston Chiu PA-C  07/07/20 1735       Weston Chiu PA-C  07/07/20 1738

## 2020-07-07 NOTE — ED NOTES
Dr. Suarez called at this time for KAN Ontiveros. Call transferred.     Maia Odell  07/07/20 9686

## 2020-07-07 NOTE — INTERVAL H&P NOTE
This is an 80-year-old male patient who was hospitalized late last week with what was thought to be an ST elevation myocardial infarction.  The patient's twelve-lead electrocardiogram performed by paramedics after a 911 call demonstrated normal sinus rhythm, left axis deviation, prominent QS complexes in leads II, III and aVF, normal R waves across the mid and left precordium with subtle ST segment elevation in leads V6, I and aVL without reciprocal ST segment depression.  After further investigation this was determined to be due to a large anterolateral-apical left ventricular aneurysm.  The patient's left ventricular ejection fraction was 20-25%.  Amazingly however, the patient's left ventricular end-diastolic pressure was low at 0-5 mmHg.  The patient has a history of 2 prior coronary artery bypass surgeries.  He had a single-vessel bypass surgery with a LIMA to the LAD in 2000.  In 2010 the patient underwent a redo four-vessel coronary artery bypass surgery with an CHARLES to the PDA, saphenous vein graft to the diagonal branch of the left anterior descending and sequential saphenous vein graft to OM1 and OM 3.  In 2017 the patient underwent stenting of the ostial and proximal portion of the vein graft that was sequential to the 2 obtuse marginal branches.  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  The patient had several episodes of VT storm which was ultimately controlled with amiodarone therapy.  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  The patient was apparently not felt to be a candidate for Tikosyn or mexiletine.  His amiodarone therapy was lowered in dose but not discontinued.  He also has a history of paroxysmal atrial fibrillation.  He has had no issues with recurrent ventricular tachycardia or atrial fibrillation.  On presentation to our hospital after reviewing his medication list with his family there was concern that the patient was  "taking full dose adult strength aspirin, Plavix 75 mg orally twice per day, warfarin 5 mg/day and Eliquis 2.5 mg/day.  I spoke with the patient's primary care provider today who indicates that his medication list have been altered recently and he was not supposed to be taking those medications,   It was her understanding that the patient was not on aspirin and was only on Eliquis 2.5 mg orally twice daily with Plavix 75 mg once per day.  He had been instructed to discontinue warfarin therapy.  However, the patient's family insisted that he was taking all for \"blood thinners\".  The patient has a history of prior hemorrhage complications with diffuse alveolar hemorrhage recently which required a several week long hospitalization.  The patient was taken to the cardiac catheterization laboratory where coronary angiography was performed by a right common femoral artery approach.  His left anterior descending was occluded at its takeoff from the left main.  The circumflex coronary artery demonstrated occlusion of the first obtuse marginal branch from its takeoff from the AV groove vessel as well as a severe stenosis in the third obtuse marginal branch proximally.  The right coronary artery demonstrated a severe ostial, proximal and mid stenosis.  There was no evidence of patency of the CHARLES graft to the PDA as there was no competitive flow or retrograde filling.  The CHARLES was not selectively engaged as we had no knowledge at that time of the patient's coronary anatomy.  The LIMA to the left anterior descending was patent.  The runoff vessel was good caliber extending to the apical recurrent branch which had severe diffuse small vessel disease.  The saphenous vein graft to the diagonal branch was patent with a good caliber runoff vessel with no focal high-grade stenosis.  The sequential reverse saphenous vein graft to OM1 and to OM 2 has been stented in the ostial and proximal portion.  The stent was widely patent and the " remainder of the vein graft body had no significant disease.  Both OM1 and OM 3 were extremely small runoff vessels with no focal stenosis.  The patient underwent angioplasty and stenting of the ostial proximal and mid right coronary artery and a straight leg complicated procedure.  The patient developed oozing from all vascular access sites post procedure.  Bleeding from his right femoral artery which had been closed with an 8 Sloven[place] Angio-Seal device was addressed with manual pressure and placement of a nonocclusive FemoStop device for 4 hours.  He had no significant drop in his hemoglobin and hematocrit.  He had no hypotension.  There was no hematoma.  The patient's initial serum creatinine was 2.5 mg/dL and increase to 10.8 mg/dL post procedure.  His serum creatinine has now decreased to 2.0 mg/dL.  The patient was not placed on angiotensin II receptor blockade or ACE inhibitor therapy due to his advanced renal failure.  The patient was discharged home yesterday feeling well.  However less than 24 hours later the patient developed progressive worsening shortness of breath with labored breathing.  On physical examination the patient's heart rate was 60 bpm with a paced rhythm.  Blood pressure was 136/72.  The patient demonstrated elevated central venous pressure and a S3 gallop.  Crackles were demonstrated in the bases and up approximately one half bilaterally.  There was no peripheral edema.  The patient is known to have severe left ventricular systolic heart failure.  Earlier last week the patient had undergone an outpatient MitraClip procedure at [facility] for severe mitral regurgitation which was felt to be the culprit for his recalcitrant heart failure.  According to the patient's primary care provider he has been admitted multiple times throughout this year and most of last year for decompensated congestive heart failure.  The patient's primary care provider was unaware of his massive left  ventricular aneurysm.  I suspect however this is known to his cardiologist as this would be the most likely substrate for his ventricular tachycardias storm.  He has had no defibrillator discharges.  The patient has had no chest discomfort at rest or with activity.  The patient's BNP was elevated at greater than 26,000.  Chest x-ray demonstrated interstitial and alveolar pulmonary edema with probable bilateral pleural effusions as well as cardiomegaly.  The patient's cardiac troponin was minimally elevated.  The patient indicates he has had no chest discomfort at rest or with activity.  He has had worsening orthopnea.  He indicates he was unable to lay flat in bed last night due to dyspnea.  He has had dizziness with posture change but no syncope.    Impression:      Decompensated left ventricular systolic heart failure.  Heart failure with reduced ejection fraction.  Stage D.  New York Heart Association functional class IV symptoms.  Ischemic etiology.  Left ventricular ejection fraction 25%.    Coronary artery disease.  Native heart.  Native vessels.  Angina free.  Cardiac troponins are expected to be elevated after his recent complex coronary revascularization as well as advanced congestive heart failure.  I anticipate these will trend downward.    Stage IV chronic renal failure.  Serum creatinine is trending downward.    Paroxysmal atrial fibrillation.  The patient is maintaining normal sinus rhythm.    Prior history of ventricular storm.  The patient has an ICD and is maintained on low-dose amiodarone therapy although it has been documented that he has liver toxicity related to amiodarone therapy.  It is unclear why he was not a candidate for either Tikosyn therapy or mexiletine.    Massive anterolateral-apical left ventricular aneurysm    Severe secondary mitral regurgitation.  Status post mitraclip.    The patient will be admitted for IV loop diuresis.  He will continue on enteric-coated aspirin 81 mg/day  with Plavix 75 mg/day.  We have not yet restarted his Eliquis therapy.  While Eliquis therapy is indicated for DVT/PE and nonvalvular atrial fibrillation, does not have an indication for left ventricular mural thrombus which is suspected.  Nevertheless, given his history of bleeding complications including diffuse pulmonary alveolar hemorrhage which was life-threatening, advanced age and advanced renal failure he would not be a candidate for warfarin therapy.  If his blood pressure tolerates we will try initiating low-dose combination hydralazine and ISDN.  The patient will be placed on a strict fluid and sodium restriction.  We will obtain an echocardiogram in the morning to ascertain the efficacy of his recent percutaneous mitral valve repair.  During contrast ventriculogram several days ago during his initial hospitalization contrast left ventriculogram did not suggest severe mitral regurgitation.

## 2020-07-07 NOTE — OUTREACH NOTE
Prep Survey      Responses   [facility] facility patient discharged from?  [facility]   Is LACE score < 7 ?  No   Eligibility  Readm Mgmt   Discharge diagnosis  NSTEMI, s/p LHC with angioplasty and stent of ostial prox and mid RCA. Left vent. anterior-apical aneurysm, CAD, HFrEF, CRF, HTN, dyslipidemia, PAF   COVID-19 Test Status  Negative   Does the patient have one of the following disease processes/diagnoses(primary or secondary)?  Acute MI (STEMI,NSTEMI)   Does the patient have Home health ordered?  No   Is there a DME ordered?  No   Comments regarding appointments  See AVS   Medication alerts for this patient  see AVS   Prep survey completed?  Yes          Aleyda Rosario RN

## 2020-07-08 ENCOUNTER — APPOINTMENT (OUTPATIENT)
Dept: CARDIOLOGY | Facility: HOSPITAL | Age: 80
End: 2020-07-08

## 2020-07-08 LAB
ANION GAP SERPL CALCULATED.3IONS-SCNC: 11.5 MMOL/L (ref 5–15)
BUN SERPL-MCNC: 30 MG/DL (ref 8–23)
BUN/CREAT SERPL: 15.5 (ref 7–25)
CALCIUM SPEC-SCNC: 8.6 MG/DL (ref 8.6–10.5)
CHLORIDE SERPL-SCNC: 106 MMOL/L (ref 98–107)
CO2 SERPL-SCNC: 22.5 MMOL/L (ref 22–29)
CREAT SERPL-MCNC: 1.93 MG/DL (ref 0.76–1.27)
GFR SERPL CREATININE-BSD FRML MDRD: 34 ML/MIN/1.73
GLUCOSE SERPL-MCNC: 98 MG/DL (ref 65–99)
POTASSIUM SERPL-SCNC: 4.2 MMOL/L (ref 3.5–5.2)
SODIUM SERPL-SCNC: 140 MMOL/L (ref 136–145)

## 2020-07-08 PROCEDURE — 25010000002 ENOXAPARIN PER 10 MG: Performed by: INTERNAL MEDICINE

## 2020-07-08 PROCEDURE — 93306 TTE W/DOPPLER COMPLETE: CPT | Performed by: INTERNAL MEDICINE

## 2020-07-08 PROCEDURE — 99231 SBSQ HOSP IP/OBS SF/LOW 25: CPT | Performed by: INTERNAL MEDICINE

## 2020-07-08 PROCEDURE — 93306 TTE W/DOPPLER COMPLETE: CPT

## 2020-07-08 PROCEDURE — 80048 BASIC METABOLIC PNL TOTAL CA: CPT | Performed by: INTERNAL MEDICINE

## 2020-07-08 PROCEDURE — 25010000002 FUROSEMIDE PER 20 MG: Performed by: INTERNAL MEDICINE

## 2020-07-08 RX ORDER — AMIODARONE HYDROCHLORIDE 200 MG/1
200 TABLET ORAL
Status: DISCONTINUED | OUTPATIENT
Start: 2020-07-08 | End: 2020-07-10 | Stop reason: HOSPADM

## 2020-07-08 RX ORDER — ASPIRIN 81 MG/1
81 TABLET ORAL DAILY
Status: DISCONTINUED | OUTPATIENT
Start: 2020-07-08 | End: 2020-07-10 | Stop reason: HOSPADM

## 2020-07-08 RX ORDER — CLOPIDOGREL BISULFATE 75 MG/1
75 TABLET ORAL DAILY
Status: DISCONTINUED | OUTPATIENT
Start: 2020-07-08 | End: 2020-07-10 | Stop reason: HOSPADM

## 2020-07-08 RX ADMIN — ISOSORBIDE DINITRATE 10 MG: 10 TABLET ORAL at 20:00

## 2020-07-08 RX ADMIN — ENOXAPARIN SODIUM 40 MG: 40 INJECTION SUBCUTANEOUS at 20:01

## 2020-07-08 RX ADMIN — AMIODARONE HYDROCHLORIDE 200 MG: 200 TABLET ORAL at 08:40

## 2020-07-08 RX ADMIN — CARVEDILOL 6.25 MG: 6.25 TABLET, FILM COATED ORAL at 08:39

## 2020-07-08 RX ADMIN — CLOPIDOGREL BISULFATE 75 MG: 75 TABLET ORAL at 08:39

## 2020-07-08 RX ADMIN — CARVEDILOL 6.25 MG: 6.25 TABLET, FILM COATED ORAL at 17:29

## 2020-07-08 RX ADMIN — SODIUM CHLORIDE, PRESERVATIVE FREE 10 ML: 5 INJECTION INTRAVENOUS at 08:42

## 2020-07-08 RX ADMIN — ASPIRIN 81 MG: 81 TABLET, COATED ORAL at 08:39

## 2020-07-08 RX ADMIN — ISOSORBIDE DINITRATE 10 MG: 10 TABLET ORAL at 11:19

## 2020-07-08 RX ADMIN — SODIUM CHLORIDE, PRESERVATIVE FREE 10 ML: 5 INJECTION INTRAVENOUS at 20:01

## 2020-07-08 RX ADMIN — FUROSEMIDE 80 MG: 10 INJECTION, SOLUTION INTRAMUSCULAR; INTRAVENOUS at 08:40

## 2020-07-08 RX ADMIN — HYDRALAZINE HYDROCHLORIDE 10 MG: 10 TABLET, FILM COATED ORAL at 14:14

## 2020-07-08 RX ADMIN — ISOSORBIDE DINITRATE 10 MG: 10 TABLET ORAL at 15:59

## 2020-07-08 RX ADMIN — FUROSEMIDE 80 MG: 10 INJECTION, SOLUTION INTRAMUSCULAR; INTRAVENOUS at 20:01

## 2020-07-08 RX ADMIN — HYDRALAZINE HYDROCHLORIDE 10 MG: 10 TABLET, FILM COATED ORAL at 21:50

## 2020-07-08 RX ADMIN — ISOSORBIDE DINITRATE 10 MG: 10 TABLET ORAL at 01:33

## 2020-07-08 NOTE — OUTREACH NOTE
AMI Week 1 Survey      Responses   [facility] patient discharged from?  [facility]   Does the patient have one of the following disease processes/diagnoses(primary or secondary)?  Acute MI (STEMI,NSTEMI)   Is there a successful TCM telephone encounter documented?  No   Week 1 attempt successful?  No   Revoke  Readmitted          Angie Lockett RN

## 2020-07-08 NOTE — PLAN OF CARE
Pt. Admitted to the hosp. R/T elevated triponin. Has remained hypotensive this shift. All other vital signs have remained within normal limits. No c/o pain or discomfort. No acute distress noted.     Problem: Patient Care Overview  Goal: Plan of Care Review  Outcome: Ongoing (interventions implemented as appropriate)     Problem: Fall Risk (Adult)  Goal: Identify Related Risk Factors and Signs and Symptoms  Outcome: Ongoing (interventions implemented as appropriate)     Problem: Diabetes, Type 2 (Adult)  Goal: Signs and Symptoms of Listed Potential Problems Will be Absent, Minimized or Managed (Diabetes, Type 2)  Outcome: Ongoing (interventions implemented as appropriate)

## 2020-07-08 NOTE — PROGRESS NOTES
Adult Nutrition  Assessment/PES    Patient Name:  Pancho Bonner  YOB: 1940  MRN: [id number]  Admit Date:  7/7/2020    Assessment Date:  7/8/2020    Comments:  Rec. #1: Consider adding consistent carbohydrate to current diet order. RD to follow pt. Consult RD PRN.     Reason for Assessment     Row Name 07/08/20 1422          Reason for Assessment    Reason For Assessment  diagnosis/disease state     Diagnosis  cardiac disease;diabetes diagnosis/complications STEMI, DM, CAD     Identified At Risk by Screening Criteria  MST SCORE 2+             Labs/Tests/Procedures/Meds     Row Name 07/08/20 1422          Labs/Procedures/Meds    Lab Results Reviewed  reviewed, pertinent     Lab Results Comments  High: BUN, Cr, HgbA1c  Low: Albumin        Medications    Pertinent Medications Reviewed  reviewed, pertinent           Estimated/Assessed Needs     Row Name 07/08/20 1423          Calculation Measurements    Weight Used For Calculations  90.5 kg (199 lb 6.5 oz)        Estimated/Assessed Needs    Additional Documentation  Windsor-St. Jeor Equation (Group);Calorie Requirements (Group);Fluid Requirements (Group);Protein Requirements (Group)        Calorie Requirements    Estimated Calorie Requirement Comment  2028-9214        Windsor-St. Jeor Equation    RMR (Windsor-St. Jeor Equation)  1700.125        Protein Requirements    Weight Used For Protein Calculations  90.5 kg (199 lb 6.8 oz)     Est Protein Requirement Amount (gms/kg)  1.2 gm protein  gm/day     Estimated Protein Requirements (gms/day)  108.55        Fluid Requirements    Estimated Fluid Requirement Method  Pickrell-Segar Formula     Pickrell-Segar Method (over 20 kg)  3309         Nutrition Prescription Ordered     Row Name 07/08/20 1423          Nutrition Prescription PO    Current PO Diet  Regular     Common Modifiers  Cardiac;Low Sodium;Fluid Restriction     Fluid Restriction mL per Day  Other (comment) 1200 mL     Low Sodium Details   1,500 mg Sodium         Evaluation of Received Nutrient/Fluid Intake     Row Name 07/08/20 1424          PO Evaluation    Number of Days PO Intake Evaluated  Insufficient Data               Problem/Interventions:  Problem 1     Row Name 07/08/20 1425          Nutrition Diagnoses Problem 1    Problem 1  Impaired Nutrient Utilization     Etiology (related to)  Medical Diagnosis     Endocrine  DM     Signs/Symptoms (evidenced by)  Biochemical     Specific Labs Noted  HgbA1C               Intervention Goal     Row Name 07/08/20 1425          Intervention Goal    General  Meet nutritional needs for age/condition     PO  PO intake (%)     PO Intake %  -- 50-75     Weight  No significant weight loss         Nutrition Intervention     Row Name 07/08/20 1425          Nutrition Intervention    RD/Tech Action  Follow Tx progress;Interview for preference;Advise available snack;Advise alternate selection         Nutrition Prescription     Row Name 07/08/20 1426          Nutrition Prescription PO    PO Prescription  Begin/change diet     Common Modifiers  Cardiac;Consistent Carbohydrate;Low Sodium;Fluid Restriction     Fluid Restriction mL per Day  Other (comment) 1200 mL     Low Sodium Details  1,500 mg Sodium     New PO Prescription Ordered?  No, recommended         Education/Evaluation     Row Name 07/08/20 1426          Education    Education  Will Instruct as appropriate        Monitor/Evaluation    Monitor  Per protocol;PO intake;Pertinent labs;Weight;Skin status           Electronically signed by:  Harriet Stone RD  07/08/20 14:27

## 2020-07-08 NOTE — PROGRESS NOTES
Discharge Planning Assessment   [facility]     Patient Name: Pancho Bonner  MRN: [id number]  Today's Date: 7/8/2020    Admit Date: 7/7/2020    Discharge Needs Assessment     Row Name 07/08/20 1529       Living Environment    Lives With  spouse    Current Living Arrangements  home/apartment/condo    Primary Care Provided by  self    Provides Primary Care For  no one    Family Caregiver if Needed  spouse    Quality of Family Relationships  involved    Able to Return to Prior Arrangements  yes       Resource/Environmental Concerns    Transportation Concerns  car, none       Transition Planning    Patient/Family Anticipates Transition to  home with family    Transportation Anticipated  family or friend will provide       Discharge Needs Assessment    Readmission Within the Last 30 Days  previous discharge plan unsuccessful    Concerns to be Addressed  discharge planning    Equipment Currently Used at Home  walker, standard;rollator;cane, straight        Discharge Plan     Row Name 07/08/20 2183       Plan    Plan  Home with family     Provided Post Acute Provider List?  N/A    Provided Post Acute Provider Quality & Resource List?  N/A    Patient/Family in Agreement with Plan  yes    Plan Comments  Case Management spoke to pt regarding discharge plans Confirmed address and phone number He was DC from the hospital on Monday Reported that he was having dizziness weakness and his primary MD instructed him to go to the hospital  He has a walker and Rolator Wife drives Wife places medication in boxes . Since he is a readmit offered rehab he is declining this he also is not sure if he wants Home Health either Reports he has a Health Surrogate .He has sons nearby that assist as needed         Destination      Coordination has not been started for this encounter.      Durable Medical Equipment      Coordination has not been started for this encounter.      Dialysis/Infusion      Coordination has not been started for this  encounter.      Home Medical Care      Coordination has not been started for this encounter.      Therapy      Coordination has not been started for this encounter.      Community Resources      Coordination has not been started for this encounter.        Expected Discharge Date and Time     Expected Discharge Date Expected Discharge Time    Jul 11, 2020         Demographic Summary     Row Name 07/08/20 1526       General Information    Admission Type  inpatient    Arrived From  emergency department    Referral Source  admission list        Functional Status     Row Name 07/08/20 1528       Functional Status    Usual Activity Tolerance  good       Functional Status, IADL    Medications  completely dependent    Meal Preparation  assistive person    Housekeeping  assistive person    Laundry  assistive person    Shopping  assistive person       Mental Status    General Appearance WDL  WDL        Psychosocial    No documentation.       Abuse/Neglect    No documentation.       Legal     Row Name 07/08/20 1528       Financial/Legal    Finance Comments  Denies any difficulty with medication food utilities         Substance Abuse    No documentation.       Patient Forms    No documentation.           Sabina Pate RN

## 2020-07-08 NOTE — PLAN OF CARE
Problem: Patient Care Overview  Goal: Plan of Care Review  Flowsheets  Taken 7/8/2020 0821  Progress: improving  Outcome Summary: Holding AM hydralazine because AM BP 88/58 (MAP 68). HR in the 50s. Patient resting in bed. Will continue to monitor.   Taken 7/7/2020 5261  Plan of Care Reviewed With: patient

## 2020-07-09 LAB
ANION GAP SERPL CALCULATED.3IONS-SCNC: 9.1 MMOL/L (ref 5–15)
BACTERIA SPEC AEROBE CULT: NORMAL
BACTERIA SPEC AEROBE CULT: NORMAL
BH CV ECHO MEAS - IVSD: 0.9 CM
BH CV ECHO MEAS - LA DIMENSION: 5.8 CM
BH CV ECHO MEAS - LVPWD: 1 CM
BH CV ECHO MEAS - MV MAX PG: 4 MMHG
BH CV ECHO MEAS - MV MEAN PG: 2 MMHG
BH CV ECHO MEAS - MV P1/2T: 145 MSEC
BH CV ECHO MEAS - MVA(P1/2T): 1.6 CM2
BH CV ECHO MEAS - RAP SYSTOLE: 15 MMHG
BH CV ECHO MEAS - RVSP: 60 MMHG
BH CV ECHO MEAS - TAPSE (>1.6): 2.2 CM2
BUN SERPL-MCNC: 35 MG/DL (ref 8–23)
BUN/CREAT SERPL: 15.1 (ref 7–25)
CALCIUM SPEC-SCNC: 8.6 MG/DL (ref 8.6–10.5)
CHLORIDE SERPL-SCNC: 104 MMOL/L (ref 98–107)
CO2 SERPL-SCNC: 24.9 MMOL/L (ref 22–29)
CREAT SERPL-MCNC: 2.32 MG/DL (ref 0.76–1.27)
GFR SERPL CREATININE-BSD FRML MDRD: 27 ML/MIN/1.73
GLUCOSE SERPL-MCNC: 105 MG/DL (ref 65–99)
LEFT ATRIUM VOLUME INDEX: 48 ML/M2
LV EF 2D ECHO EST: 25 %
MAXIMAL PREDICTED HEART RATE: 140 BPM
POTASSIUM SERPL-SCNC: 4.2 MMOL/L (ref 3.5–5.2)
SARS-COV-2 RNA PNL SPEC NAA+PROBE: NOT DETECTED
SODIUM SERPL-SCNC: 138 MMOL/L (ref 136–145)
STRESS TARGET HR: 119 BPM

## 2020-07-09 PROCEDURE — 25010000002 FUROSEMIDE PER 20 MG: Performed by: INTERNAL MEDICINE

## 2020-07-09 PROCEDURE — 99239 HOSP IP/OBS DSCHRG MGMT >30: CPT | Performed by: INTERNAL MEDICINE

## 2020-07-09 PROCEDURE — 87635 SARS-COV-2 COVID-19 AMP PRB: CPT | Performed by: INTERNAL MEDICINE

## 2020-07-09 PROCEDURE — 99231 SBSQ HOSP IP/OBS SF/LOW 25: CPT | Performed by: INTERNAL MEDICINE

## 2020-07-09 PROCEDURE — 80048 BASIC METABOLIC PNL TOTAL CA: CPT | Performed by: INTERNAL MEDICINE

## 2020-07-09 RX ORDER — TORSEMIDE 20 MG/1
60 TABLET ORAL DAILY
Status: DISCONTINUED | OUTPATIENT
Start: 2020-07-09 | End: 2020-07-10 | Stop reason: HOSPADM

## 2020-07-09 RX ORDER — ISOSORBIDE DINITRATE 10 MG/1
20 TABLET ORAL
Status: DISCONTINUED | OUTPATIENT
Start: 2020-07-09 | End: 2020-07-10 | Stop reason: HOSPADM

## 2020-07-09 RX ORDER — METOLAZONE 5 MG/1
7.5 TABLET ORAL
Status: DISCONTINUED | OUTPATIENT
Start: 2020-07-09 | End: 2020-07-10 | Stop reason: HOSPADM

## 2020-07-09 RX ORDER — SPIRONOLACTONE 25 MG/1
12.5 TABLET ORAL DAILY
Status: DISCONTINUED | OUTPATIENT
Start: 2020-07-09 | End: 2020-07-10 | Stop reason: HOSPADM

## 2020-07-09 RX ORDER — METOLAZONE 5 MG/1
7.5 TABLET ORAL
Status: DISCONTINUED | OUTPATIENT
Start: 2020-07-10 | End: 2020-07-09

## 2020-07-09 RX ORDER — TORSEMIDE 20 MG/1
60 TABLET ORAL DAILY
Status: DISCONTINUED | OUTPATIENT
Start: 2020-07-09 | End: 2020-07-09

## 2020-07-09 RX ORDER — ISOSORBIDE DINITRATE 10 MG/1
20 TABLET ORAL
Status: DISCONTINUED | OUTPATIENT
Start: 2020-07-09 | End: 2020-07-09

## 2020-07-09 RX ORDER — PSEUDOEPHEDRINE HCL 30 MG
100 TABLET ORAL DAILY PRN
Start: 2020-07-09 | End: 2020-07-10

## 2020-07-09 RX ORDER — METOLAZONE 2.5 MG/1
7.5 TABLET ORAL
Start: 2020-07-12 | End: 2020-07-10

## 2020-07-09 RX ORDER — ISOSORBIDE DINITRATE 20 MG/1
20 TABLET ORAL
Start: 2020-07-09 | End: 2020-07-10

## 2020-07-09 RX ORDER — HYDRALAZINE HYDROCHLORIDE 10 MG/1
20 TABLET, FILM COATED ORAL EVERY 8 HOURS SCHEDULED
Start: 2020-07-09 | End: 2020-07-10

## 2020-07-09 RX ORDER — METOLAZONE 5 MG/1
7.5 TABLET ORAL
Status: DISCONTINUED | OUTPATIENT
Start: 2020-07-09 | End: 2020-07-09

## 2020-07-09 RX ORDER — HYDRALAZINE HYDROCHLORIDE 10 MG/1
20 TABLET, FILM COATED ORAL EVERY 8 HOURS SCHEDULED
Status: DISCONTINUED | OUTPATIENT
Start: 2020-07-09 | End: 2020-07-10 | Stop reason: HOSPADM

## 2020-07-09 RX ORDER — BISACODYL 10 MG
10 SUPPOSITORY, RECTAL RECTAL DAILY PRN
Status: DISCONTINUED | OUTPATIENT
Start: 2020-07-09 | End: 2020-07-10 | Stop reason: HOSPADM

## 2020-07-09 RX ORDER — TORSEMIDE 20 MG/1
60 TABLET ORAL DAILY
Start: 2020-07-10 | End: 2020-07-10

## 2020-07-09 RX ORDER — DOCUSATE SODIUM 100 MG/1
100 CAPSULE, LIQUID FILLED ORAL DAILY PRN
Status: DISCONTINUED | OUTPATIENT
Start: 2020-07-09 | End: 2020-07-10 | Stop reason: HOSPADM

## 2020-07-09 RX ORDER — SPIRONOLACTONE 25 MG/1
12.5 TABLET ORAL DAILY
Start: 2020-07-10 | End: 2020-07-10

## 2020-07-09 RX ADMIN — APIXABAN 2.5 MG: 2.5 TABLET, FILM COATED ORAL at 12:10

## 2020-07-09 RX ADMIN — MAGNESIUM HYDROXIDE 20 ML: 2400 SUSPENSION ORAL at 16:33

## 2020-07-09 RX ADMIN — FUROSEMIDE 80 MG: 10 INJECTION, SOLUTION INTRAMUSCULAR; INTRAVENOUS at 08:32

## 2020-07-09 RX ADMIN — CARVEDILOL 6.25 MG: 6.25 TABLET, FILM COATED ORAL at 08:31

## 2020-07-09 RX ADMIN — ISOSORBIDE DINITRATE 20 MG: 10 TABLET ORAL at 21:48

## 2020-07-09 RX ADMIN — CLOPIDOGREL BISULFATE 75 MG: 75 TABLET ORAL at 08:31

## 2020-07-09 RX ADMIN — SODIUM CHLORIDE, PRESERVATIVE FREE 10 ML: 5 INJECTION INTRAVENOUS at 20:00

## 2020-07-09 RX ADMIN — SPIRONOLACTONE 12.5 MG: 25 TABLET, FILM COATED ORAL at 12:10

## 2020-07-09 RX ADMIN — HYDRALAZINE HYDROCHLORIDE 20 MG: 10 TABLET, FILM COATED ORAL at 16:38

## 2020-07-09 RX ADMIN — HYDRALAZINE HYDROCHLORIDE 10 MG: 10 TABLET, FILM COATED ORAL at 05:33

## 2020-07-09 RX ADMIN — APIXABAN 2.5 MG: 2.5 TABLET, FILM COATED ORAL at 20:00

## 2020-07-09 RX ADMIN — ASPIRIN 81 MG: 81 TABLET, COATED ORAL at 08:31

## 2020-07-09 RX ADMIN — SODIUM CHLORIDE, PRESERVATIVE FREE 10 ML: 5 INJECTION INTRAVENOUS at 08:32

## 2020-07-09 RX ADMIN — AMIODARONE HYDROCHLORIDE 200 MG: 200 TABLET ORAL at 08:31

## 2020-07-09 RX ADMIN — CARVEDILOL 6.25 MG: 6.25 TABLET, FILM COATED ORAL at 19:59

## 2020-07-09 RX ADMIN — TORSEMIDE 60 MG: 20 TABLET ORAL at 13:48

## 2020-07-09 RX ADMIN — ISOSORBIDE DINITRATE 20 MG: 10 TABLET ORAL at 16:38

## 2020-07-09 RX ADMIN — ISOSORBIDE DINITRATE 10 MG: 10 TABLET ORAL at 10:17

## 2020-07-09 RX ADMIN — HYDRALAZINE HYDROCHLORIDE 20 MG: 10 TABLET, FILM COATED ORAL at 21:48

## 2020-07-09 RX ADMIN — METOLAZONE 7.5 MG: 5 TABLET ORAL at 13:01

## 2020-07-09 NOTE — PLAN OF CARE
Problem: Patient Care Overview  Goal: Plan of Care Review  Outcome: Ongoing (interventions implemented as appropriate)  Flowsheets (Taken 7/9/2020 0760)  Progress: improving  Plan of Care Reviewed With: patient  Note:   Patient rested between care.  No changes to patient condition to report during shift.  Will continue to monitor.

## 2020-07-09 NOTE — PROGRESS NOTES
PC/MD consult received for Comfort Care.  Dr Wyatt notified.  Reported would be able to meet with family after 2:30p.  Pt was Full Code status however Dr Sue spoke with pt and spouse then changed code status to No CPR/Comfort Measures.   Pt reported having an Advance Directive but none scanned into system.  Spouse was asked to bring the papers in to be scanned.    Pt admitted with c/o SOA, productive cough-clear sputum.   Recent admission 7/4/2020--7/6/2020 for NSTEMI, Left ventriculry anterior-apical aneurysm, HF with reduced EF.  Pt currently assessed as:  Decompensated left ventricular systolic HF with EF 25%, CAD, Stage IV renal failure, Massive anterolateral apical left ventricular aneurysm.  Pt's BP has been symptomatically low.  Reported near collapse yesterday when trying to stand.   Dyspneic with speaking.    Informed Dr Sue discussed with pt and spouse prognostication of life expectancy of less than 6 months and recommended Hospice referral.    Visited with pt and spouse.  Both had just been crying.  Spouse asked if I was with Hospice.  I explained I was with the Palliative Care team at the hospital and was visiting first to see how pt was doing and then to provide information regarding Palliative and Hospice Care.  I informed them I would not stay long, this was just a chance to meet them today.  I informed them that Dr Wyatt (the Hospice Medical Director) would be in this afternoon around 1P to meet with them.  I wanted to make sure pt's wife would be here.   They did ask a few questions re: Hospice services and pt inquired about the cost.  I assured him Medicare would pay for any equipment he would need and medications related to his Hospice diagnosis.  I included that some medications are very expensive and Hospice sometimes has to substitute.  I assured them Dr Wyatt could speak with Dr Sue if there was a questionable medication.  Spouse related that Dr Wyatt was her PCP several  yrs ago.  She confirmed she would be here.    HCP notified of planned Hospice referral with anticipated DC tomorrow.  Informed Maricel, I would call back after Dr Wyatt sees pt.  Pt's primary nurse, Yennifer ANGELO, updated.

## 2020-07-09 NOTE — DISCHARGE SUMMARY
[facility]-Cardiology Discharge Summary    Date of Discharge:  7/9/2020    Discharge Diagnosis:   Acute on chronic left ventricular systolic heart failure.  Heart failure with reduced ejection fraction.  Stage D.  New York Heart Association functional class IV symptoms.  Left ventricular ejection fraction 25%.  Ischemic etiology.  Coronary artery disease.  Native heart.  Native vessels.  Angina free.  Left ventricular anterior-apical aneurysm  Chronic stage IV renal failure    Presenting Problem/History of Present Illness  Acutely decompensated left ventricular systolic heart failure      Hospital Course  Patient is a 80 y.o. male presented to the emergency room less than 24 hours after being discharged from our facility when he presented with acutely decompensated congestive heart failure.  The patient's initial twelve-lead electrocardiogram performed by the 911 EMS personnel was thought to demonstrate a lateral ST elevation myocardial infarction.  The patient underwent emergency coronary angiography with angioplasty and stenting to the right coronary artery.  However, it was later discovered that his subtle ST elevation in lead V6, I and aVL was due to a large anterior, anterolateral and apical left ventricular aneurysm.  This ST segment elevation has been chronic and persistent.  The patient has a longstanding history of complex coronary artery disease including single-vessel coronary artery bypass surgery in 2000 consisting of a DUNCAN to LAD.  The patient underwent redo coronary artery bypass surgery 10 years later with four-vessel grafting including a CHARLES-PDA, SVG to diagonal and sequential SVG to OM1 and OM 3.  The patient has a history of severe left ventricular systolic heart failure and has been admitted on 3 separate occasions within the last 30 days for heart failure.  Prior to his first admission here, the patient had undergone a mitraclip procedure at [facility] in [facility] for severe  "secondary mitral regurgitation.  Within 12 hours of discharge the patient was complaining of severe shortness of breath with labored respirations.  He presented to the emergency room where his chest x-ray demonstrated bilateral diffuse interstitial and alveolar edema.  His BNP was greatly elevated.  His cardiac troponins were trending downward.  His twelve-lead electrocardiogram showed no ischemic ST-T wave changes over those at baseline.  The patient had no chest discomfort.  He had had worsening orthopnea and PND.  He had no peripheral edema.  He had dizziness on posture change but no syncope or palpitations.  He has a history of paroxysmal atrial fibrillation.  The patient has had bleeding complications related to \"triple therapy\" consisting of warfarin, aspirin and Plavix.  The patient had previously been hospitalized for a prolonged period of time after developing diffuse alveolar hemorrhage.  The patient also has a history of \"VT storm\".  He has a normally functioning biventricular ICD.  The patient's ventricular arrhythmia was controlled with amiodarone therapy but the patient subsequently developed liver toxicity.  He was not felt to be a candidate for mexiletine or Tikosyn and his electrophysiologist recommended continuing low-dose amiodarone despite known toxicity.  The patient underwent an echocardiogram which demonstrated his left ventricular ejection fraction to be 25% with a large left ventricular anterior-apical aneurysm.  The mitral clip was identified to be in the appropriate position and he had no more than 1+ residual mitral regurgitation.  There was no mitral stenosis.  Left ventricular filling demonstrated a restrictive pattern with elevated mean left atrial pressure by E/e' despite 48 hours of aggressive IV loop diuresis.  The patient's vasodilator therapy was increased.  He was started on low-dose hydralazine and ISDN.  He was felt to be a non-candidate for ACE inhibitor therapy and/or " "angiotensin II receptor blockade due to chronic renal failure.  The patient's initial serum creatinine on his first hospitalization was 2.5 mg/dL.  His serum creatinine increased after his catheterization to 2.8 mg/dL.  His serum creatinine has trended down with diuretics and afterload reducing therapy to 1.9 mg/dL.  A long discussion was engaged with the patient and his wife regarding his poor prognosis.  The natural history of his disease is well known in light of his multiple hospitalizations for decompensated congestive heart failure in the last 30 days despite multiple percutaneous coronary and structural heart interventions which have had no favorable impact.  In addition he has not responded to guideline directed medical therapy.  With escalation of his diuretics and medical therapy the patient's blood pressure has gotten much lower than usual.  He is however to tolerate systolic blood pressures in the upper 70s-low 80s while maintaining consciousness, mental faculties and urine output.  However he is extremely dizzy and presyncopal on standing with severe weakness.  The obvious \"trade-off\" with the patient was discussed regarding controlling his dyspnea and therefore his comfort level with maximum tolerated doses of diuretics and vasodilator therapy.  The downside to this strategy is his blood pressure will be so low that he is effectively bedfast.  Even when the patient systolic blood pressure is allowed to be in the 100-110 mmHg range he demonstrates severe pulmonary vascular congestion.  For this reason as well as his estimated life expectancy to be only 1-2 months, hospice has been consulted.  He was seen by the palliative care physician today.  The patient and his family have decided to change the DNR status to full DNR.  We are making arrangements for hospice home health nursing to provide the patient with a hospital bed which allows the head of the bed to be elevated as well as a bedside toilet with " "hand rails.  The patient has been advised that he will not be allowed to bathe or dress himself.  The patient has been advised that he will no longer be able to travel by personal vehicle for clinic visits.  The patient and his wife have been reassured that the hospice home health nursing will communicate his clinical condition to me.  The patient will be discharged to home in the morning by ambulance.  I have consulted inpatient pharmacy for the \"meds-to-beds\" program to avoid the need for seeking an outpatient pharmacy.  The patient will be discharged to home on a complex medical therapy for his vasodilators and diuretic therapies.  We are trying to arrange that the patient receive his medication which has the potential to lower his blood pressure with a minimum of a 2-hour dosing gap.  This is complicated by the twice daily dosing of Coreg as well as the 3 times daily dosing of combination hydralazine/ISDN.  In addition, the patient will be taking metolazone 7.5 mg orally 30 minutes before his morning dose of Demadex but only on every third day.  The inpatient pharmacy has made a time schedule for dosing of his medications and this has been communicated to the nursing staff.  However, this will have to be relayed to the hospice nursing personnel so they are aware and can further educate the patient as to his complex medication dosing program.  In addition it will need to be communicated to the hospice home health nurses that systolic blood pressures between 75-85 mmHg are not \"too low\" as the long as the patient is able to remain consciousness, orientation, reasonable mental faculties and urine output.  The absolute number of his systolic blood pressure is of secondary consequence to his overall clinical state.  They will need to be instructed not to send the patient back to the emergency room for \"low blood pressure\" unless the patient is experiencing mental status changes, excessive drowsiness or oliguria.  The " patient was offered inpatient hospice as well as inpatient skilled nursing home care.  He and his wife both adamantly refused.  The patient and his wife have been educated that his overall clinical condition will rapidly deteriorate.  The patient indicates that if it is possible he would like to be made comfortable at home and subsequently die at home.    Procedures Performed  Echocardiography       Consults:   Consults     Date and Time Order Name Status Description    7/9/2020 1103 Inpatient Palliative Care MD Consult      7/4/2020 0913 Inpatient Nephrology Consult Completed     7/4/2020 0913 Inpatient Hospitalist Consult Completed     7/4/2020 0908 Inpatient Hospitalist Consult Completed           Pertinent Test Results: See echo report      Echo EF Estimated  Lab Results   Component Value Date    ECHOEFEST 25 07/08/2020       Condition on Discharge: Guarded.  Terminal end-stage ischemic cardiomyopathy      Vital Signs  Temp:  [98 °F (36.7 °C)-98.3 °F (36.8 °C)] 98.2 °F (36.8 °C)  Heart Rate:  [59-71] 60  Resp:  [16-18] 18  BP: ()/(47-77) 110/77    Physical Exam:     General Appearance:    Alert, cooperative, in mild respiratory acute distress   Head:    Normocephalic, without obvious abnormality, atraumatic   Eyes:            Lids and lashes normal, conjunctivae and sclerae normal, no   icterus, no pallor, corneas clear, PERRLA   Ears:    Ears appear intact with no abnormalities noted   Throat:   No oral lesions, no thrush, oral mucosa moist   Neck:   No adenopathy, supple, trachea midline, no thyromegaly, no   carotid bruit, no JVD   Back:     No kyphosis present, no scoliosis present, no skin lesions,      erythema or scars, no tenderness to percussion or                   palpation,   range of motion normal   Lungs:    Moist rales are heard in the bases bilaterally,respirations regular, even and mildly labored with speaking    Heart:    Regular rhythm and normal rate, normal S1 and S2, no             murmur, no gallop, no rub, no click   Chest Wall:    No abnormalities observed   Abdomen:     Normal bowel sounds, no masses, no organomegaly, soft        non-tender, non-distended, no guarding, no rebound                tenderness   Rectal:     Deferred   Extremities:   Moves all extremities well, no edema, no cyanosis, no             Redness.  Extremities are cool and dry   Pulses:   Pulses palpable and equal bilaterally   Skin:   No bleeding, bruising or rash   Lymph nodes:   No palpable adenopathy   Neurologic:   Cranial nerves 2 - 12 grossly intact, sensation intact, DTR       present and equal bilaterally       Discharge Disposition  Home or Self Care    Discharge Medications     Discharge Medications      New Medications      Instructions Start Date   docusate sodium 100 MG capsule   100 mg, Oral, Daily PRN      hydrALAZINE 10 MG tablet  Commonly known as:  APRESOLINE   20 mg, Oral, Every 8 Hours Scheduled      isosorbide dinitrate 20 MG tablet  Commonly known as:  ISORDIL   20 mg, Oral, 3 Times Daily (Nitrates)      magnesium hydroxide 2400 MG/10ML suspension suspension  Commonly known as:  MILK OF MAGNESIA   20 mL, Oral, Daily PRN      metOLazone 2.5 MG tablet  Commonly known as:  ZAROXOLYN   7.5 mg, Oral, Every 72 Hours   Start Date:  July 12, 2020     spironolactone 25 MG tablet  Commonly known as:  ALDACTONE   12.5 mg, Oral, Daily   Start Date:  July 10, 2020     torsemide 20 MG tablet  Commonly known as:  DEMADEX   60 mg, Oral, Daily   Start Date:  July 10, 2020        Continue These Medications      Instructions Start Date   amiodarone 200 MG tablet  Commonly known as:  PACERONE   200 mg, Oral, Daily      apixaban 2.5 MG tablet tablet  Commonly known as:  ELIQUIS   2.5 mg, Oral, 2 Times Daily      aspirin 81 MG EC tablet   81 mg, Oral, Daily      bisacodyl 5 MG EC tablet  Commonly known as:  Dulcolax   Clear liquid diet day prior to colonoscopy. 2 at 3pm and 2 at 7pm.      carvedilol 6.25 MG  tablet  Commonly known as:  COREG   6.25 mg, Oral, 2 Times Daily      clopidogrel 75 MG tablet  Commonly known as:  PLAVIX   75 mg, Oral, Daily      diphenoxylate-atropine 2.5-0.025 MG per tablet  Commonly known as:  LOMOTIL   1 tablet, Oral, 4 Times Daily PRN      glimepiride 1 MG tablet  Commonly known as:  AMARYL   1 mg, Oral, Every Morning Before Breakfast      nitroglycerin 0.4 MG SL tablet  Commonly known as:  NITROSTAT   0.4 mg, Sublingual, Every 5 Minutes PRN, Take no more than 3 doses in 15 minutes.       pantoprazole 40 MG EC tablet  Commonly known as:  PROTONIX   40 mg, Oral, Daily      PHARMACY DISCHARGE CONSULT   1 each, Does not apply, Daily (Monday-Friday)      polyethylene glycol packet  Commonly known as:  MIRALAX   17 g, Oral, Daily PRN             Discharge Diet:     Activity at Discharge:     Follow-up Appointments  Future Appointments   Date Time Provider Department Center   7/20/2020  3:30 PM Vasyl Sue MD [facility] None         Test Results Pending at Discharge   Order Current Status    COVID PRE-OP / PRE-PROCEDURE SCREENING ORDER (NO ISOLATION) - Swab, Nasopharynx Collected (07/09/20 1712)    COVID-19[facility], NP SWAB IN LEXAR SALINE MEDIA 24-30 HR TAT - Swab, Nasopharynx Collected (07/09/20 1712)    Green Top (No Gel) In process    [place] Draw In process           Vasyl Sue MD  07/09/20  17:46    Time: Discharge 45 min

## 2020-07-09 NOTE — PROGRESS NOTES
"G-Cardiology Progress note     LOS: 2 days   Patient Care Team:  Mady Coy MD as PCP - General (Internal Medicine)  Shar Obregon MD as PCP - Claims Attributed  Shorty Guido MD as Consulting Physician (General Surgery)    Chief Complaint: Shortness of breath    Subjective:    Interval History: The patient's echocardiogram yesterday demonstrated severe left ventricular enlargement with a massive left ventricular anterior-apical aneurysm.  The ejection fraction is 20-25%.  The patient demonstrates evidence of continued elevation in mean left atrial pressure with a restrictive left ventricular filling pattern (an independent predictor of poor outcome and increase short-term mortality) despite 48 hours of intense IV loop diuresis.  The patient's blood pressure has been symptomatically low but is necessary to maintain very low blood pressures in order to help improve his dyspnea.  The patient nearly collapsed yesterday while trying to stand to use a bedside toilet.  He has been placed on strict bedrest with no bathroom privileges.  The patient shortness of breath is marginally better but he still becomes dyspneic with speaking.    Patient Complaints: Shortness of breath  Patient Denies:   Chest pain,  peripheral edema, palpitations, cough, sputum production, hemoptysis, abdominal pain, nausea, vomiting, diarrhea, fevers chills or night sweats  History taken from: patient family RN    Review of Systems:   All systems were reviewed and negative       Objective:    Vital Sign Min/Max for last 24 hours  Temp  Min: 97.7 °F (36.5 °C)  Max: 98.3 °F (36.8 °C)   BP  Min: 77/51  Max: 127/63   Pulse  Min: 59  Max: 71   Resp  Min: 16  Max: 18   SpO2  Min: 92 %  Max: 97 %   No data recorded   Weight  Min: 77 kg (169 lb 12.1 oz)  Max: 78.1 kg (172 lb 2.9 oz)     Flowsheet Rows      First Filed Value   Admission Height  190.5 cm (75\") Documented at 07/07/2020 1555   Admission Weight  78.5 kg (173 lb) Documented " at 07/07/2020 1555          Physical Exam:     General Appearance:    Alert, cooperative, in no acute distress   Head:    Normocephalic, without obvious abnormality, atraumatic   Eyes:            Lids and lashes normal, conjunctivae and sclerae normal, no   icterus, no pallor, corneas clear, PERRLA   Ears:    Ears appear intact with no abnormalities noted   Throat:   No oral lesions, no thrush, oral mucosa moist   Neck:   No adenopathy, supple, trachea midline, no thyromegaly, no   carotid bruit, no JVD   Back:     No kyphosis present, no scoliosis present, no skin lesions,      erythema or scars, no tenderness to percussion or                   palpation,   range of motion normal   Lungs:    Moist crackles are heard in the bases bilaterally and up approximately one third in the lung fields respirations regular, even and mildly labored labored with speech    Heart:    Regular rhythm and normal rate, normal S1 and S2, no            murmur, no gallop, no rub, no click   Chest Wall:    No abnormalities observed   Abdomen:     Normal bowel sounds, no masses, no organomegaly, soft        non-tender, non-distended, no guarding, no rebound                tenderness   Rectal:     Deferred   Extremities:   Moves all extremities well, no edema, no cyanosis, no             Redness.  Extremities are cool and dry.   Pulses:   Pulses palpable and equal bilaterally   Skin:   No bleeding, bruising or rash   Lymph nodes:   No palpable adenopathy   Neurologic:   Cranial nerves 2 - 12 grossly intact, sensation intact, DTR       present and equal bilaterally        Results Review:     I reviewed the patient's new clinical results.  Results from last 7 days   Lab Units 07/04/20  0658   HEMOGLOBIN A1C % 6.20*     Results from last 7 days   Lab Units 07/09/20  0536   SODIUM mmol/L 138   POTASSIUM mmol/L 4.2   CHLORIDE mmol/L 104   CO2 mmol/L 24.9   BUN mg/dL 35*   CREATININE mg/dL 2.32*   GLUCOSE mg/dL 105*   CALCIUM mg/dL 8.6     Results  "from last 7 days   Lab Units 07/07/20  1611 07/06/20  0609 07/05/20  0428  07/04/20  0658   WBC 10*3/mm3 5.75 6.80  --   --  18.38*   HEMOGLOBIN g/dL 12.1* 11.2* 11.7*   < > 13.8   HEMATOCRIT % 37.6 35.1* 36.7*   < > 42.0   PLATELETS 10*3/mm3 173 156  --   --  184    < > = values in this interval not displayed.         Echo EF Estimated  Lab Results   Component Value Date    ECHOEFEST 25 07/08/2020       Physical Exam    Medication Review: yes    Assessment/Plan:    His echocardiographic findings confirmed my worst fears.  His left ventricular systolic function has continued to deteriorate despite multiple otherwise successful coronary and structural heart interventions.  His MitraClip device is well positioned and working normally.  His mitral regurgitation grade is now 1+ or less.  There is no evidence of mitral stenosis.  Left ventricular systolic function has declined to 20-25% with a massive anterior-apical left ventricular aneurysm.  Mural thrombus cannot be excluded but is suspected.  Despite 48 hours of aggressive IV diuresis the patient's echocardiogram continues to demonstrate a \"restrictive left ventricular filling pattern\" with evidence of elevated mean left atrial pressure.  The patient feels that his shortness of breath is marginally better.  The patient is unable to stand without support.  He nearly collapsed yesterday trying to get to a bedside commode.  The only option for management at this point is to lower his systolic blood pressure into the upper 70s-lower 80s range which may help minimize his dyspnea but will make even standing to use a bedside commode impossible and dangerous due to syncope-collapse.  This effectively makes the patient bedridden/bedfast from a cardiovascular perspective.  It has been explained to the patient and his family that even if we allow his systolic blood pressure to increase to the 100 mmHg range this will result in cardiogenic pulmonary edema and severe dyspnea.  " The only trade-off to prolong his life is to lower his blood pressure to the point that he is able to remain conscious with normal mentation and normal urine output.  Posture and ambulation are no longer an option.  Given the clinical course and natural history of his underlying end-stage cardiomyopathy, particularly the fact that he has been hospitalized for severe congestive heart failure 3 times in 3 weeks, I suspect the patient's life expectancy is clearly less than 6 months with a more reasonable estimate of 1 to 2 months.  The patient has been offered both long-term inpatient skilled nursing home care as well as inpatient hospice.  He and his wife refused both.  They are insisting on returning to home.  Given his prognosis, I have consulted palliative care and asked home health to establish a hospice level service visit for at very minimum a hospital bed which allows for the elevation of the head of the bed as well as a bedside commode with rail handholds.  I have asked for a palliative care consult from Dr. Rapp.  I have discussed end-of-life issues with the patient and he has decided that he does not want any form of resuscitation.  He is DNR status will be changed to reflect his wishes.  We will continue to treat his congestive heart failure with standard guideline directed medical therapy.  The patient will require a complicated schedule of interval dosing of his beta-blocker and vasodilator therapy as well as dosing intervals for his complex diuretic schedule.  The patient and his wife have been educated that diuretic therapy for advanced congestive heart failure only works in the context of strict fluid and sodium restriction.  The patient and his wife have been educated as to foods and beverages that are high in sodium content with instructions to avoid these.  I have recommended a 1.5 L/day fluid restriction which may need to be further restricted to 1 L/day.  I have recommended a less than 1200  mg/day sodium restriction.  I have consulted the dietitians to discuss strategies his wife can follow at home to maintain these restrictions as well as how to measure out his daily fluid intake and keep a diary so he does not exceed 50 ounces in a 24-hour timeframe.  Obviously, the patient's underlying chronic renal failure will complicate strategies directed towards his congestive heart failure.  Fortunately however his renal function has gradually improved with diuresis and other vasodilator therapies.          Plan for disposition:Where: home and When:  tomorrow    Vasyl Sue MD  07/09/20  11:06

## 2020-07-09 NOTE — PROGRESS NOTES
Continued Stay Note  [facility]     Patient Name: Pancho Bonner  MRN: [id number]  Today's Date: 7/9/2020    Admit Date: 7/7/2020    Discharge Plan     Row Name 07/09/20 1108       Plan    Plan Comments  Spoke to pt  and wife He is tearful Receiving a poor diagnosis  He will need to be on Bedrest at home Per Dr Sue poor prognosis  .Wife reports she wants him home declining nursing home Has agreed to home with Hospice    Palliative care consult placed  Dr Sue to consult Dr Henry          Discharge Codes    No documentation.       Expected Discharge Date and Time     Expected Discharge Date Expected Discharge Time    Jul 11, 2020             Sabina Pate RN

## 2020-07-09 NOTE — PROGRESS NOTES
Dr Wyatt and PC nurse met with pt and spouse.  Pt initially sleeping but awakened to verbal stimuli.  Dr Wyatt confirmed with pt and spouse of request for Hospice services.   Spouse related Dr Sue informed them of life expectancy of 6 months or less and recommended Hospice Care.  Dr Wyatt stressed to pt and spouse the focus now will be on living the best he can for however many days/months he has left.  Spouse reported they had been sad this morning but now they want to move on and do what needs to be done.  Spouse related wanting to keep pt at home per his wishes and hers.  Dr Wyatt reviewed Hospice services at home and added should symptoms be difficult to manage at home they will have the option of going to [facility] at the [facility].    Dr Wyatt then discussed pt having an implanted defibrillator and the usual recommendation of deactivating the device prior to discharge.   She explained as pt becomes sicker, it may continue to shock him.  Spouse and pt agreed to have it deactivated.  Dr Wyatt stated she would inform Dr Sue.  We discussed possible DC tomorrow after equipment delivered.  I confirmed with pt that he did not want to be resuscitated.  I explained the purpose of the EMS/DNR form and the need for his signature if he agreed with the DNR.  He did and signed the form.  A copy was faxed to [employer] to be scanned into the chart, a copy and original placed in the chart and a copy to be given to spouse.  I informed pt and spouse I had contacted HCP earlier and now just needed to inform them that pt will be going home with Hospice referral possibly tomorrow.   I confirmed with spouse her cell phone number [phone].  I informed her a Hospice nurse will call her to arrange delivery of equipment.   I informed her that someone would need to be at the house to let them in.  She verbalized understanding.    HCP notified of plan and will contact Hospice RN.

## 2020-07-09 NOTE — PLAN OF CARE
Pt. Admitted to the hosp. R/T end stage heart failure. Vital signs have been stable. No c/o pain or discomfort. No acute distress noted.     Problem: Patient Care Overview  Goal: Plan of Care Review  Outcome: Ongoing (interventions implemented as appropriate)     Problem: Patient Care Overview  Goal: Discharge Needs Assessment  Outcome: Ongoing (interventions implemented as appropriate)     Problem: Fall Risk (Adult)  Goal: Identify Related Risk Factors and Signs and Symptoms  Outcome: Ongoing (interventions implemented as appropriate)     Problem: Fall Risk (Adult)  Goal: Absence of Fall  Outcome: Ongoing (interventions implemented as appropriate)     Problem: Diabetes, Type 2 (Adult)  Goal: Signs and Symptoms of Listed Potential Problems Will be Absent, Minimized or Managed (Diabetes, Type 2)  Outcome: Ongoing (interventions implemented as appropriate)

## 2020-07-10 VITALS
OXYGEN SATURATION: 97 % | RESPIRATION RATE: 18 BRPM | DIASTOLIC BLOOD PRESSURE: 62 MMHG | SYSTOLIC BLOOD PRESSURE: 104 MMHG | WEIGHT: 169.31 LBS | HEIGHT: 75 IN | TEMPERATURE: 98.2 F | BODY MASS INDEX: 21.05 KG/M2 | HEART RATE: 71 BPM

## 2020-07-10 LAB
ANION GAP SERPL CALCULATED.3IONS-SCNC: 11.2 MMOL/L (ref 5–15)
BUN SERPL-MCNC: 46 MG/DL (ref 8–23)
BUN/CREAT SERPL: 17.6 (ref 7–25)
CALCIUM SPEC-SCNC: 8.9 MG/DL (ref 8.6–10.5)
CHLORIDE SERPL-SCNC: 101 MMOL/L (ref 98–107)
CO2 SERPL-SCNC: 24.8 MMOL/L (ref 22–29)
CREAT SERPL-MCNC: 2.61 MG/DL (ref 0.76–1.27)
GFR SERPL CREATININE-BSD FRML MDRD: 24 ML/MIN/1.73
GLUCOSE SERPL-MCNC: 124 MG/DL (ref 65–99)
POTASSIUM SERPL-SCNC: 4.1 MMOL/L (ref 3.5–5.2)
SODIUM SERPL-SCNC: 137 MMOL/L (ref 136–145)

## 2020-07-10 PROCEDURE — 99239 HOSP IP/OBS DSCHRG MGMT >30: CPT | Performed by: INTERNAL MEDICINE

## 2020-07-10 PROCEDURE — 80048 BASIC METABOLIC PNL TOTAL CA: CPT | Performed by: INTERNAL MEDICINE

## 2020-07-10 RX ORDER — PSEUDOEPHEDRINE HCL 30 MG
100 TABLET ORAL DAILY PRN
Qty: 30 EACH | Refills: 0
Start: 2020-07-10

## 2020-07-10 RX ORDER — SPIRONOLACTONE 25 MG/1
12.5 TABLET ORAL DAILY
Qty: 30 TABLET | Refills: 0
Start: 2020-07-10

## 2020-07-10 RX ORDER — TORSEMIDE 20 MG/1
60 TABLET ORAL DAILY
Qty: 30 TABLET | Refills: 0
Start: 2020-07-10

## 2020-07-10 RX ORDER — HYDRALAZINE HYDROCHLORIDE 10 MG/1
20 TABLET, FILM COATED ORAL EVERY 8 HOURS SCHEDULED
Qty: 30 TABLET | Refills: 0
Start: 2020-07-10

## 2020-07-10 RX ORDER — ISOSORBIDE DINITRATE 20 MG/1
20 TABLET ORAL
Qty: 30 TABLET | Refills: 3
Start: 2020-07-10

## 2020-07-10 RX ORDER — METOLAZONE 2.5 MG/1
7.5 TABLET ORAL
Qty: 30 TABLET | Refills: 0 | Status: SHIPPED | OUTPATIENT
Start: 2020-07-12

## 2020-07-10 RX ADMIN — APIXABAN 2.5 MG: 2.5 TABLET, FILM COATED ORAL at 08:14

## 2020-07-10 RX ADMIN — HYDRALAZINE HYDROCHLORIDE 20 MG: 10 TABLET, FILM COATED ORAL at 16:23

## 2020-07-10 RX ADMIN — ISOSORBIDE DINITRATE 20 MG: 10 TABLET ORAL at 11:02

## 2020-07-10 RX ADMIN — AMIODARONE HYDROCHLORIDE 200 MG: 200 TABLET ORAL at 08:14

## 2020-07-10 RX ADMIN — HYDRALAZINE HYDROCHLORIDE 20 MG: 10 TABLET, FILM COATED ORAL at 11:04

## 2020-07-10 RX ADMIN — CARVEDILOL 6.25 MG: 6.25 TABLET, FILM COATED ORAL at 08:14

## 2020-07-10 RX ADMIN — TORSEMIDE 60 MG: 20 TABLET ORAL at 05:48

## 2020-07-10 RX ADMIN — ASPIRIN 81 MG: 81 TABLET, COATED ORAL at 08:14

## 2020-07-10 RX ADMIN — SODIUM CHLORIDE, PRESERVATIVE FREE 10 ML: 5 INJECTION INTRAVENOUS at 08:15

## 2020-07-10 RX ADMIN — ISOSORBIDE DINITRATE 20 MG: 10 TABLET ORAL at 16:23

## 2020-07-10 RX ADMIN — CLOPIDOGREL BISULFATE 75 MG: 75 TABLET ORAL at 08:14

## 2020-07-10 RX ADMIN — SPIRONOLACTONE 12.5 MG: 25 TABLET, FILM COATED ORAL at 12:03

## 2020-07-10 NOTE — PROGRESS NOTES
"COVID 19 test resulted:  NOT DETECTED     HCP informed earlier.   Awaiting ICD de-activation.   Spoke with pt's primary nurse, Yennifer ANGELO, earlier to request Dr Sue be notified when he rounded.   Dr Wyatt left voice message regarding ICD yesterday.    Spoke with Parveen RN/Hospice who reported equipment was planned to be delivered around 12:00 today.      Visited pt.  Son at bedside.   Pt reported feeling \"OK\" today and was glad to be going home.  I explained I had spoken with hospice re: equipment delivery and Dr Sue had dictated the DC summary yesterday.  We are currently waiting for the deactivation of the ICD and Dr Sue will need to dictate a note for today.       Pt did report having an episode of SOA for which he used his O2.  He reported the O2 helped a lot.  I informed him that Hospice will be delivering O2 to his home as well for use as needed.  Pt and son denied needs at this time.  "

## 2020-07-10 NOTE — PROGRESS NOTES
"1330  Was informed Dr Sue was on vaction and Dr Molina would be covering.  Pt's primary nurse, Yennifer ANGELO, informed Dr Molina of need for addendum to DC summary and deactivation of ICD.   I was informed to call the company for deactivation.  Updated pt while son at bedside.  Pt reported the \"card\" for the ICD was in his wallet at home.  I contacted his spouse who was not able to find the card.  Pt then said it was on the monitor.  She provided a number for [employer] services which when called stated they did not have pt name in system and it could be a different division.  1400   Called Dr Sue's office for information re: ICD company.  I was informed they did not have the company name but provided the name of person who checked defibrillators.  He did not have pt listed as one of his clients.  He did provide the name of three companies in the area who might be of assistance.    1500  After several phone calls, Pnacho at [facility] [phone], said pt was a client of their company.  I explained the need to have the defibrillator deactivated.  He reported they had been very busy today and will be able to see pt probably around 6p.    Pt was asleep so I telephoned his wife and informed her.  She related pt really wanted to go home today.  I informed her pt can still go home it will just be a little later.  She agreed.    1530  Called Meds to beds to confirm pt would receive three day supply of new medications.  Informed they required prescriptions.  Informed Yennifer ANGELO and requested she contact Dr Molina for prescriptions to be sent to [facility].  Contacted pt's spouse to inform her about new medications being filled by the Out[facility] pharmacy for 3 day supply and there may be a co-pay.  She agreed with the plan.    1630  Pt awake.  Informed of delay in discharge due to person to conduct deactivation of defibrillator not available until around 6pm.  I informed him I had spoken with his wife.  He was pleased.  "

## 2020-07-10 NOTE — PLAN OF CARE
Problem: Patient Care Overview  Goal: Plan of Care Review  Outcome: Ongoing (interventions implemented as appropriate)  Flowsheets (Taken 7/10/2020 2324)  Progress: no change  Plan of Care Reviewed With: patient  Note:   VSS.  Patient rested throughout shift.  No change in patient condition during shift to report.  Will continue to monitor.

## 2020-07-10 NOTE — PROGRESS NOTES
Case Management Discharge Note           Provided Post Acute Provider List?: N/A  Provided Post Acute Provider Quality & Resource List?: N/A    Destination      No service has been selected for the patient.      Durable Medical Equipment      No service has been selected for the patient.      Dialysis/Infusion      No service has been selected for the patient.      Home Medical Care - Selection Complete      Service Provider Request Status Selected Services Address Phone Number Fax Number    [facility] [address], [place] [address] [phone]      Therapy      No service has been selected for the patient.      Community Resources      No service has been selected for the patient.             Final Discharge Disposition Code: 50 - home with hospice

## 2020-07-10 NOTE — DISCHARGE SUMMARY
[facility] Cardiology Discharge Summary     Pancho Bonner  1940  [id number]    Admission Date: 7/7/2020  Discharge Date: 07/10/20    Primary Discharge Diagnosis:   Acute on chronic left ventricular systolic heart failure.  Heart failure with reduced ejection fraction.  Stage D.  New York Heart Association functional class IV symptoms.  Left ventricular ejection fraction 25%.  Ischemic etiology.  Coronary artery disease.  Native heart.  Native vessels.  Angina free.  Left ventricular anterior-apical aneurysm  Chronic stage IV renal failure    Consults:   Consults     Date and Time Order Name Status Description    7/9/2020 1103 Inpatient Palliative Care MD Consult      7/4/2020 0913 Inpatient Nephrology Consult Completed     7/4/2020 0913 Inpatient Hospitalist Consult Completed     7/4/2020 0908 Inpatient Hospitalist Consult Completed           Hospital Course:     Patient is a 80 y.o. male presented to the emergency room less than 24 hours after being discharged from our facility when he presented with acutely decompensated congestive heart failure.  The patient's initial twelve-lead electrocardiogram performed by the 911 EMS personnel was thought to demonstrate a lateral ST elevation myocardial infarction.  The patient underwent emergency coronary angiography with angioplasty and stenting to the right coronary artery.  However, it was later discovered that his subtle ST elevation in lead V6, I and aVL was due to a large anterior, anterolateral and apical left ventricular aneurysm.  This ST segment elevation has been chronic and persistent.  The patient has a longstanding history of complex coronary artery disease including single-vessel coronary artery bypass surgery in 2000 consisting of a DUNCAN to LAD.  The patient underwent redo coronary artery bypass surgery 10 years later with four-vessel grafting including a CHARLES-PDA, SVG to diagonal and sequential SVG to OM1 and OM 3.  The patient  "has a history of severe left ventricular systolic heart failure and has been admitted on 3 separate occasions within the last 30 days for heart failure.  Prior to his first admission here, the patient had undergone a mitraclip procedure at [facility] in [facility] for severe secondary mitral regurgitation.  Within 12 hours of discharge the patient was complaining of severe shortness of breath with labored respirations.  He presented to the emergency room where his chest x-ray demonstrated bilateral diffuse interstitial and alveolar edema.  His BNP was greatly elevated.  His cardiac troponins were trending downward.  His twelve-lead electrocardiogram showed no ischemic ST-T wave changes over those at baseline.  The patient had no chest discomfort.  He had had worsening orthopnea and PND.  He had no peripheral edema.  He had dizziness on posture change but no syncope or palpitations.  He has a history of paroxysmal atrial fibrillation.  The patient has had bleeding complications related to \"triple therapy\" consisting of warfarin, aspirin and Plavix.  The patient had previously been hospitalized for a prolonged period of time after developing diffuse alveolar hemorrhage.  The patient also has a history of \"VT storm\".  He has a normally functioning biventricular ICD.  The patient's ventricular arrhythmia was controlled with amiodarone therapy but the patient subsequently developed liver toxicity.  He was not felt to be a candidate for mexiletine or Tikosyn and his electrophysiologist recommended continuing low-dose amiodarone despite known toxicity.  The patient underwent an echocardiogram which demonstrated his left ventricular ejection fraction to be 25% with a large left ventricular anterior-apical aneurysm.  The mitral clip was identified to be in the appropriate position and he had no more than 1+ residual mitral regurgitation.  There was no mitral stenosis.  Left ventricular filling demonstrated " "a restrictive pattern with elevated mean left atrial pressure by E/e' despite 48 hours of aggressive IV loop diuresis.  The patient's vasodilator therapy was increased.  He was started on low-dose hydralazine and ISDN.  He was felt to be a non-candidate for ACE inhibitor therapy and/or angiotensin II receptor blockade due to chronic renal failure.  The patient's initial serum creatinine on his first hospitalization was 2.5 mg/dL.  His serum creatinine increased after his catheterization to 2.8 mg/dL.  His serum creatinine has trended down with diuretics and afterload reducing therapy to 1.9 mg/dL.  A long discussion was engaged with the patient and his wife regarding his poor prognosis.  The natural history of his disease is well known in light of his multiple hospitalizations for decompensated congestive heart failure in the last 30 days despite multiple percutaneous coronary and structural heart interventions which have had no favorable impact.  In addition he has not responded to guideline directed medical therapy.  With escalation of his diuretics and medical therapy the patient's blood pressure has gotten much lower than usual.  He is however to tolerate systolic blood pressures in the upper 70s-low 80s while maintaining consciousness, mental faculties and urine output.  However he is extremely dizzy and presyncopal on standing with severe weakness.  The obvious \"trade-off\" with the patient was discussed regarding controlling his dyspnea and therefore his comfort level with maximum tolerated doses of diuretics and vasodilator therapy.  The downside to this strategy is his blood pressure will be so low that he is effectively bedfast.  Even when the patient systolic blood pressure is allowed to be in the 100-110 mmHg range he demonstrates severe pulmonary vascular congestion.  For this reason as well as his estimated life expectancy to be only 1-2 months, hospice has been consulted.  He was seen by the palliative " "care physician today.  The patient and his family have decided to change the DNR status to full DNR.  We are making arrangements for hospice home health nursing to provide the patient with a hospital bed which allows the head of the bed to be elevated as well as a bedside toilet with hand rails.  The patient has been advised that he will not be allowed to bathe or dress himself.  The patient has been advised that he will no longer be able to travel by personal vehicle for clinic visits.  The patient and his wife have been reassured that the hospice home health nursing will communicate his clinical condition to me.  The patient will be discharged to home in the morning by ambulance.  I have consulted inpatient pharmacy for the \"meds-to-beds\" program to avoid the need for seeking an outpatient pharmacy.  The patient will be discharged to home on a complex medical therapy for his vasodilators and diuretic therapies.  We are trying to arrange that the patient receive his medication which has the potential to lower his blood pressure with a minimum of a 2-hour dosing gap.  This is complicated by the twice daily dosing of Coreg as well as the 3 times daily dosing of combination hydralazine/ISDN.  In addition, the patient will be taking metolazone 7.5 mg orally 30 minutes before his morning dose of Demadex but only on every third day.  The inpatient pharmacy has made a time schedule for dosing of his medications and this has been communicated to the nursing staff.  However, this will have to be relayed to the hospice nursing personnel so they are aware and can further educate the patient as to his complex medication dosing program.  In addition it will need to be communicated to the hospice home health nurses that systolic blood pressures between 75-85 mmHg are not \"too low\" as the long as the patient is able to remain consciousness, orientation, reasonable mental faculties and urine output.  The absolute number of his " "systolic blood pressure is of secondary consequence to his overall clinical state.  They will need to be instructed not to send the patient back to the emergency room for \"low blood pressure\" unless the patient is experiencing mental status changes, excessive drowsiness or oliguria.  The patient was offered inpatient hospice as well as inpatient skilled nursing home care.  He and his wife both adamantly refused.  The patient and his wife have been educated that his overall clinical condition will rapidly deteriorate.  The patient indicates that if it is possible he would like to be made comfortable at home and subsequently die at home.    Procedures Performed  None    Pertinent Test Results:   1.  Echocardiogram 7/9/2020  · The following left ventricular wall segments are hypokinetic: basal inferolateral, mid inferolateral, apical inferior and mid inferior. The following left ventricular wall segments are dyskinetic: apical anterior, apical lateral, apical septal, mid anteroseptal and apex. The following left ventricular wall segments are akinetic: mid anterior.  · The left ventricular cavity is severely dilated.  · Estimated EF = 25%.  · Left ventricular systolic function is severely decreased.  · Left ventricular diastolic dysfunction (grade II) consistent with pseudonormalization.  · Right ventricular cavity is borderline dilated.  · Left atrial cavity size is severely dilated.  · The mitral valve peak gradient is 4 mmHg.  · The mitral valve mean gradient is 2 mmHg.  · The mitral valve area (PHT) is 1.6 cm2.  · Calculated right ventricular systolic pressure from tricuspid regurgitation is 60 mmHg.  ·       Discharge Disposition: Discharge to home with home hospice      Discharge Medications     Discharge Medications      New Medications      Instructions Start Date   docusate sodium 100 MG capsule   100 mg, Oral, Daily PRN      hydrALAZINE 10 MG tablet  Commonly known as:  APRESOLINE   20 mg, Oral, Every 8 Hours " Scheduled      isosorbide dinitrate 20 MG tablet  Commonly known as:  ISORDIL   20 mg, Oral, 3 Times Daily (Nitrates)      magnesium hydroxide 2400 MG/10ML suspension suspension  Commonly known as:  MILK OF MAGNESIA   20 mL, Oral, Daily PRN      metOLazone 2.5 MG tablet  Commonly known as:  ZAROXOLYN   7.5 mg, Oral, Every 72 Hours   Start Date:  July 12, 2020     spironolactone 25 MG tablet  Commonly known as:  ALDACTONE   12.5 mg, Oral, Daily      torsemide 20 MG tablet  Commonly known as:  DEMADEX   60 mg, Oral, Daily         Continue These Medications      Instructions Start Date   amiodarone 200 MG tablet  Commonly known as:  PACERONE   200 mg, Oral, Daily      apixaban 2.5 MG tablet tablet  Commonly known as:  ELIQUIS   2.5 mg, Oral, 2 Times Daily      aspirin 81 MG EC tablet   81 mg, Oral, Daily      bisacodyl 5 MG EC tablet  Commonly known as:  Dulcolax   Clear liquid diet day prior to colonoscopy. 2 at 3pm and 2 at 7pm.      carvedilol 6.25 MG tablet  Commonly known as:  COREG   6.25 mg, Oral, 2 Times Daily      clopidogrel 75 MG tablet  Commonly known as:  PLAVIX   75 mg, Oral, Daily      diphenoxylate-atropine 2.5-0.025 MG per tablet  Commonly known as:  LOMOTIL   1 tablet, Oral, 4 Times Daily PRN      glimepiride 1 MG tablet  Commonly known as:  AMARYL   1 mg, Oral, Every Morning Before Breakfast      nitroglycerin 0.4 MG SL tablet  Commonly known as:  NITROSTAT   0.4 mg, Sublingual, Every 5 Minutes PRN, Take no more than 3 doses in 15 minutes.       pantoprazole 40 MG EC tablet  Commonly known as:  PROTONIX   40 mg, Oral, Daily      PHARMACY DISCHARGE CONSULT   1 each, Does not apply, Daily (Monday-Friday)      polyethylene glycol packet  Commonly known as:  MIRALAX   17 g, Oral, Daily PRN             Follow-up Appointments  Future Appointments   Date Time Provider Department Center   7/20/2020  3:30 PM Vasyl Sue MD [facility] CD [facility] None         Test Results Pending at Discharge   Order Current  Status    Green Top (No Gel) In process    [place] Draw In process           B.Fran Molina MD  Interventional Cardiology   07/10/20  13:49    Time: Discharge 45 min

## 2020-07-13 NOTE — PROGRESS NOTES
Pt discharged to home with Hospice referral via [facility] 7/10/2020 @ 3093

## 2020-12-11 NOTE — PROGRESS NOTES
MAUREEN Clancy MD [facility] FACS    Assistant: JHONNY Parr MD    Anesthesia: Regional    Findings/Key Flash Clancy MD [facility] FACS  Discharge Note  SUMMARY    Admit Date: 12/11/2020    Attending Physician: Flash Clancy MD FACS    Discharge Physician: Flash Clancy Pt was seen today in [facility] for a Phase 3 visit.     
